# Patient Record
Sex: FEMALE | Race: WHITE | Employment: OTHER | URBAN - METROPOLITAN AREA
[De-identification: names, ages, dates, MRNs, and addresses within clinical notes are randomized per-mention and may not be internally consistent; named-entity substitution may affect disease eponyms.]

---

## 2017-04-20 ENCOUNTER — HOSPITAL ENCOUNTER (OUTPATIENT)
Dept: LAB | Facility: HOSPITAL | Age: 67
Discharge: HOME | End: 2017-04-20
Attending: INTERNAL MEDICINE
Payer: MEDICARE

## 2017-04-20 ENCOUNTER — PRIOR ORIGINAL RECORDS (OUTPATIENT)
Dept: OTHER | Age: 67
End: 2017-04-20

## 2017-04-20 PROCEDURE — 80053 COMPREHEN METABOLIC PANEL: CPT | Performed by: INTERNAL MEDICINE

## 2017-04-20 PROCEDURE — 84443 ASSAY THYROID STIM HORMONE: CPT | Performed by: INTERNAL MEDICINE

## 2017-04-20 PROCEDURE — 36415 COLL VENOUS BLD VENIPUNCTURE: CPT | Performed by: INTERNAL MEDICINE

## 2017-04-20 PROCEDURE — 80061 LIPID PANEL: CPT | Performed by: INTERNAL MEDICINE

## 2017-04-24 ENCOUNTER — PRIOR ORIGINAL RECORDS (OUTPATIENT)
Dept: OTHER | Age: 67
End: 2017-04-24

## 2017-04-25 LAB
ALBUMIN: 3.6 G/DL
ALKALINE PHOSPHATATE(ALK PHOS): 76 IU/L
BILIRUBIN TOTAL: 0.6 MG/DL
BUN: 12 MG/DL
CALCIUM: 9 MG/DL
CHLORIDE: 102 MEQ/L
CHOLESTEROL, TOTAL: 118 MG/DL
CREATININE, SERUM: 0.83 MG/DL
GLUCOSE: 90 MG/DL
HDL CHOLESTEROL: 52 MG/DL
LDL CHOLESTEROL: 43 MG/DL
POTASSIUM, SERUM: 4 MEQ/L
PROTEIN, TOTAL: 7.1 G/DL
SGOT (AST): 13 IU/L
SGPT (ALT): 17 IU/L
SODIUM: 139 MEQ/L
THYROID STIMULATING HORMONE: 4.14 MLU/L
TRIGLYCERIDES: 115 MG/DL

## 2017-05-08 ENCOUNTER — PRIOR ORIGINAL RECORDS (OUTPATIENT)
Dept: OTHER | Age: 67
End: 2017-05-08

## 2017-05-22 ENCOUNTER — PRIOR ORIGINAL RECORDS (OUTPATIENT)
Dept: OTHER | Age: 67
End: 2017-05-22

## 2017-05-22 ENCOUNTER — HOSPITAL ENCOUNTER (OUTPATIENT)
Dept: CT IMAGING | Facility: HOSPITAL | Age: 67
Discharge: HOME | End: 2017-05-22
Attending: INTERNAL MEDICINE
Payer: MEDICARE

## 2017-05-22 DIAGNOSIS — R07.9 CHEST PAIN: ICD-10-CM

## 2017-05-22 DIAGNOSIS — I25.10 CORONARY ATHEROSCLEROSIS OF NATIVE CORONARY ARTERY: ICD-10-CM

## 2017-05-22 DIAGNOSIS — R06.03 ACUTE RESPIRATORY DISTRESS: ICD-10-CM

## 2017-05-22 PROCEDURE — 71275 CT ANGIOGRAPHY CHEST: CPT | Performed by: INTERNAL MEDICINE

## 2017-05-25 ENCOUNTER — HOSPITAL ENCOUNTER (OUTPATIENT)
Dept: CV DIAGNOSTICS | Facility: HOSPITAL | Age: 67
Discharge: HOME | End: 2017-05-25
Attending: INTERNAL MEDICINE
Payer: MEDICARE

## 2017-05-25 DIAGNOSIS — I25.10 CORONARY ATHEROSCLEROSIS: ICD-10-CM

## 2017-05-25 PROCEDURE — 93017 CV STRESS TEST TRACING ONLY: CPT | Performed by: INTERNAL MEDICINE

## 2017-05-25 PROCEDURE — 93018 CV STRESS TEST I&R ONLY: CPT | Performed by: INTERNAL MEDICINE

## 2017-05-25 PROCEDURE — 78452 HT MUSCLE IMAGE SPECT MULT: CPT | Performed by: INTERNAL MEDICINE

## 2017-05-25 NOTE — PROGRESS NOTES
Patient had 4/10 chest tightness during her Lexiscan Nuc Stress Test which resolved with O2 at 2L and caffiene. After being stressed the patient was walking to the bathroom and became weak requiring a wheelchair. No chest pain was noted.  /60 HR 60 wi

## 2017-10-21 DIAGNOSIS — E03.9 HYPOTHYROIDISM, UNSPECIFIED TYPE: ICD-10-CM

## 2017-10-23 RX ORDER — LEVOTHYROXINE SODIUM 50 MCG
50 TABLET ORAL
Qty: 90 TABLET | Refills: 0 | Status: ON HOLD | OUTPATIENT
Start: 2017-10-23 | End: 2018-07-08 | Stop reason: DRUGHIGH

## 2017-10-24 ENCOUNTER — LAB ENCOUNTER (OUTPATIENT)
Dept: LAB | Age: 67
End: 2017-10-24
Attending: INTERNAL MEDICINE
Payer: MEDICARE

## 2017-10-24 ENCOUNTER — PRIOR ORIGINAL RECORDS (OUTPATIENT)
Dept: OTHER | Age: 67
End: 2017-10-24

## 2017-10-24 DIAGNOSIS — I25.10 CORONARY ATHEROSCLEROSIS OF NATIVE CORONARY ARTERY: Primary | ICD-10-CM

## 2017-10-24 DIAGNOSIS — E03.2 IATROGENIC HYPOTHYROIDISM: ICD-10-CM

## 2017-10-24 DIAGNOSIS — R07.2 PRECORDIAL PAIN: ICD-10-CM

## 2017-10-24 DIAGNOSIS — E78.2 MIXED HYPERLIPIDEMIA: ICD-10-CM

## 2017-10-24 PROCEDURE — 80061 LIPID PANEL: CPT

## 2017-10-24 PROCEDURE — 36415 COLL VENOUS BLD VENIPUNCTURE: CPT

## 2017-10-24 PROCEDURE — 80053 COMPREHEN METABOLIC PANEL: CPT

## 2017-10-24 PROCEDURE — 84443 ASSAY THYROID STIM HORMONE: CPT

## 2017-10-25 LAB
ALKALINE PHOSPHATATE(ALK PHOS): 74 IU/L
BILIRUBIN TOTAL: 0.6 MG/DL
BUN: 13 MG/DL
CALCIUM: 9.1 MG/DL
CHLORIDE: 107 MEQ/L
CHOLESTEROL, TOTAL: 117 MG/DL
CREATININE, SERUM: 0.83 MG/DL
GLUCOSE: 88 MG/DL
HDL CHOLESTEROL: 54 MG/DL
LDL CHOLESTEROL: 43 MG/DL
POTASSIUM, SERUM: 3.9 MEQ/L
PROTEIN, TOTAL: 7 G/DL
SGOT (AST): 17 IU/L
SGPT (ALT): 19 IU/L
SODIUM: 141 MEQ/L
THYROID STIMULATING HORMONE: 4.79 MLU/L
TRIGLYCERIDES: 101 MG/DL

## 2017-11-13 ENCOUNTER — PRIOR ORIGINAL RECORDS (OUTPATIENT)
Dept: OTHER | Age: 67
End: 2017-11-13

## 2017-11-14 ENCOUNTER — OFFICE VISIT (OUTPATIENT)
Dept: FAMILY MEDICINE CLINIC | Facility: CLINIC | Age: 67
End: 2017-11-14

## 2017-11-14 VITALS
WEIGHT: 136 LBS | TEMPERATURE: 98 F | HEART RATE: 94 BPM | BODY MASS INDEX: 23.22 KG/M2 | OXYGEN SATURATION: 94 % | HEIGHT: 64 IN | DIASTOLIC BLOOD PRESSURE: 60 MMHG | RESPIRATION RATE: 16 BRPM | SYSTOLIC BLOOD PRESSURE: 116 MMHG

## 2017-11-14 DIAGNOSIS — S70.11XA HEMATOMA OF THIGH, RIGHT, INITIAL ENCOUNTER: Primary | ICD-10-CM

## 2017-11-14 PROCEDURE — 99213 OFFICE O/P EST LOW 20 MIN: CPT | Performed by: FAMILY MEDICINE

## 2017-11-14 NOTE — PROGRESS NOTES
Approximately 4-5 days ago patient fell into a chair no loss of consciousness no amnesia but developed a very immediate bruise measuring 6 x 12\" on her right lateral thigh. She is on aspirin and Plavix.     Patient is a smoker she is also under supervisio

## 2017-11-20 ENCOUNTER — MYAURORA ACCOUNT LINK (OUTPATIENT)
Dept: OTHER | Age: 67
End: 2017-11-20

## 2017-11-20 ENCOUNTER — PRIOR ORIGINAL RECORDS (OUTPATIENT)
Dept: OTHER | Age: 67
End: 2017-11-20

## 2017-11-20 ENCOUNTER — HOSPITAL ENCOUNTER (OUTPATIENT)
Dept: CARDIOLOGY CLINIC | Facility: HOSPITAL | Age: 67
Discharge: HOME | End: 2017-11-20
Attending: INTERNAL MEDICINE

## 2017-11-20 DIAGNOSIS — I73.9 CLAUDICATION (HCC): ICD-10-CM

## 2017-11-20 DIAGNOSIS — T14.8XXA HEMATOMA: ICD-10-CM

## 2017-11-20 DIAGNOSIS — M79.606 PAIN OF LOWER EXTREMITY, UNSPECIFIED LATERALITY: ICD-10-CM

## 2017-11-29 ENCOUNTER — HOSPITAL ENCOUNTER (OUTPATIENT)
Dept: ULTRASOUND IMAGING | Facility: HOSPITAL | Age: 67
Discharge: HOME | End: 2017-11-29
Attending: INTERNAL MEDICINE
Payer: MEDICARE

## 2017-11-29 DIAGNOSIS — I73.9 CLAUDICATION (HCC): ICD-10-CM

## 2017-11-29 DIAGNOSIS — M79.606 PAIN OF LOWER EXTREMITY, UNSPECIFIED LATERALITY: ICD-10-CM

## 2017-11-29 DIAGNOSIS — T14.8XXA HEMATOMA: ICD-10-CM

## 2017-11-29 PROCEDURE — 93970 EXTREMITY STUDY: CPT | Performed by: INTERNAL MEDICINE

## 2017-12-27 ENCOUNTER — OFFICE VISIT (OUTPATIENT)
Dept: FAMILY MEDICINE CLINIC | Facility: CLINIC | Age: 67
End: 2017-12-27

## 2017-12-27 VITALS
SYSTOLIC BLOOD PRESSURE: 120 MMHG | HEART RATE: 84 BPM | TEMPERATURE: 99 F | HEIGHT: 64 IN | BODY MASS INDEX: 23.22 KG/M2 | WEIGHT: 136 LBS | RESPIRATION RATE: 20 BRPM | OXYGEN SATURATION: 98 % | DIASTOLIC BLOOD PRESSURE: 80 MMHG

## 2017-12-27 DIAGNOSIS — I25.10 CORONARY ARTERY DISEASE INVOLVING NATIVE CORONARY ARTERY OF NATIVE HEART WITHOUT ANGINA PECTORIS: ICD-10-CM

## 2017-12-27 DIAGNOSIS — K57.51 DIVERTICULOSIS OF BOTH SMALL AND LARGE INTESTINE WITH BLEEDING: ICD-10-CM

## 2017-12-27 DIAGNOSIS — E55.9 VITAMIN D DEFICIENCY: ICD-10-CM

## 2017-12-27 DIAGNOSIS — J44.9 CHRONIC OBSTRUCTIVE PULMONARY DISEASE, UNSPECIFIED COPD TYPE (HCC): ICD-10-CM

## 2017-12-27 DIAGNOSIS — Z72.0 TOBACCO ABUSE: ICD-10-CM

## 2017-12-27 DIAGNOSIS — E03.9 HYPOTHYROIDISM, UNSPECIFIED TYPE: ICD-10-CM

## 2017-12-27 DIAGNOSIS — I20.8 STABLE ANGINA (HCC): ICD-10-CM

## 2017-12-27 DIAGNOSIS — I20.0 UNSTABLE ANGINA (HCC): ICD-10-CM

## 2017-12-27 DIAGNOSIS — G43.001 MIGRAINE WITHOUT AURA AND WITH STATUS MIGRAINOSUS, NOT INTRACTABLE: ICD-10-CM

## 2017-12-27 DIAGNOSIS — Z13.820 SCREENING FOR OSTEOPOROSIS: ICD-10-CM

## 2017-12-27 DIAGNOSIS — I73.9 CLAUDICATION (HCC): ICD-10-CM

## 2017-12-27 DIAGNOSIS — Z12.31 ENCOUNTER FOR SCREENING MAMMOGRAM FOR HIGH-RISK PATIENT: ICD-10-CM

## 2017-12-27 DIAGNOSIS — F33.1 MODERATE RECURRENT MAJOR DEPRESSION (HCC): Chronic | ICD-10-CM

## 2017-12-27 DIAGNOSIS — Z79.02 PLATELET INHIBITION DUE TO PLAVIX: ICD-10-CM

## 2017-12-27 DIAGNOSIS — E04.9 GOITER: ICD-10-CM

## 2017-12-27 DIAGNOSIS — Z78.0 ASYMPTOMATIC MENOPAUSE: ICD-10-CM

## 2017-12-27 DIAGNOSIS — Z00.00 ENCOUNTER FOR ANNUAL HEALTH EXAMINATION: Primary | ICD-10-CM

## 2017-12-27 DIAGNOSIS — Z12.11 COLON CANCER SCREENING: ICD-10-CM

## 2017-12-27 DIAGNOSIS — F41.9 ANXIETY: ICD-10-CM

## 2017-12-27 DIAGNOSIS — Z13.31 DEPRESSION SCREENING: ICD-10-CM

## 2017-12-27 PROCEDURE — 99213 OFFICE O/P EST LOW 20 MIN: CPT | Performed by: FAMILY MEDICINE

## 2017-12-27 PROCEDURE — G0439 PPPS, SUBSEQ VISIT: HCPCS | Performed by: FAMILY MEDICINE

## 2017-12-27 PROCEDURE — G0444 DEPRESSION SCREEN ANNUAL: HCPCS | Performed by: FAMILY MEDICINE

## 2017-12-27 RX ORDER — LEVOTHYROXINE SODIUM 75 MCG
75 TABLET ORAL
Qty: 90 TABLET | Refills: 1 | Status: SHIPPED | OUTPATIENT
Start: 2017-12-27 | End: 2018-06-14

## 2017-12-27 NOTE — PATIENT INSTRUCTIONS
Donaldo Rendon's SCREENING SCHEDULE   Tests on this list are recommended by your physician but may not be covered, or covered at this frequency, by your insurer. Please check with your insurance carrier before scheduling to verify coverage.    PREVENTATI • Men who are 73-68 years old and have smoked more than 100 cigarettes in their lifetime   • Anyone with a family history    Colorectal Cancer Screening  Covered up to Age 76     Colonoscopy Screen   Covered every 10 years- more often if abnormal Colonos found for this or any previous visit.  Please get every year    Pneumococcal 13 (Prevnar)  Covered Once after 65   Orders placed or performed in visit on 10/13/15  -PNEUMOCOCCAL VACC, 13 NILDA IM    Please get once after your 65th birthday    Pneumococcal 23

## 2017-12-27 NOTE — PROGRESS NOTES
HPI:   Meek Issa is a 78year old female who presents for a Medicare Subsequent Annual Wellness visit (Pt already had Initial Annual Wellness).     Pt also here to follow up on depression, anxiety, copd, hypothyroid   New c/o alopecia; pt wants to a your work, take care of things at home, or get along with other people?: Somewhat difficult        Advanced Directive:   She does have a Living Will but we do NOT have it on file in [address] [place] [address] Rd.    The patient has this document but we do not have it in Epic, and p cardiology      Chronic obstructive pulmonary disease ([facility] [address]- pt not using her inhalers      Moderate recurrent major depression ([facility] [address]- pt worse due to the holidays; pt sees psychology     Wt Readings from Last 3 Encounters:  12/27/17 : 136 lb  11/14/17 : 1 Butalbital-APAP-Caffeine (FIORICET, ESGIC) -40 MG Oral Tab Take 1 tablet by mouth every 6 (six) hours as needed for Pain.  Take one to 2 tablets as needed   Isosorbide Mononitrate ER (IMDUR) 30 MG Oral Tablet 24 Hr    Metoprolol Succinate ER (TOPROL GENERAL: feels well otherwise  SKIN: denies any unusual skin lesions  EYES: denies blurred vision or double vision  HEENT: denies nasal congestion, sinus pain or ST  LUNGS: denies shortness of breath with exertion  CARDIOVASCULAR: denies chest pain on ex murmur, rub, or gallop   Abdomen:   Soft, non-tender, bowel sounds active all four quadrants,  no masses, no organomegaly   Pelvic: Deferred   Extremities: Extremities normal, atraumatic, no cyanosis or edema   Pulses: 2+ and symmetric   Skin: Skin color, artery disease involving native coronary artery of native heart without angina pectoris- pt sees cardiology   -     OFFICE/OUTPT VISIT,EST,LEVL III    Hypothyroidism, unspecified type- increase dose ; repeat labs in 6 weeks   -     SYNTHROID 75 MCG Oral Ta describe your current health state?: (P) Fair  How do you maintain positive mental well-being?: (P) Social Interaction;Puzzles      This section provided for quick review of chart, separate sheet to patient  PREVENTATIVE SERVICES  INDICATIONS AND SCHEDULE (Update Immunization Activity if applicable)     Influenza  Covered Annually  Please get every year    Pneumococcal 13 (Prevnar)  Covered Once after 65 10/13/2015 Please get once after your 65th birthday    Pneumococcal 23 (Pneumovax)  Covered Once after 6

## 2018-01-19 ENCOUNTER — MYAURORA ACCOUNT LINK (OUTPATIENT)
Dept: OTHER | Age: 68
End: 2018-01-19

## 2018-01-19 ENCOUNTER — PRIOR ORIGINAL RECORDS (OUTPATIENT)
Dept: OTHER | Age: 68
End: 2018-01-19

## 2018-02-12 ENCOUNTER — HOSPITAL ENCOUNTER (OUTPATIENT)
Dept: MAMMOGRAPHY | Age: 68
Discharge: HOME | End: 2018-02-12
Attending: FAMILY MEDICINE
Payer: MEDICARE

## 2018-02-12 ENCOUNTER — HOSPITAL ENCOUNTER (OUTPATIENT)
Dept: ULTRASOUND IMAGING | Age: 68
Discharge: HOME | End: 2018-02-12
Attending: FAMILY MEDICINE
Payer: MEDICARE

## 2018-02-12 ENCOUNTER — APPOINTMENT (OUTPATIENT)
Dept: LAB | Age: 68
End: 2018-02-12
Attending: FAMILY MEDICINE
Payer: MEDICARE

## 2018-02-12 ENCOUNTER — HOSPITAL ENCOUNTER (OUTPATIENT)
Dept: BONE DENSITY | Age: 68
Discharge: HOME | End: 2018-02-12
Attending: FAMILY MEDICINE
Payer: MEDICARE

## 2018-02-12 DIAGNOSIS — E03.9 HYPOTHYROIDISM, UNSPECIFIED TYPE: ICD-10-CM

## 2018-02-12 DIAGNOSIS — E55.9 VITAMIN D DEFICIENCY: ICD-10-CM

## 2018-02-12 DIAGNOSIS — E04.9 GOITER: ICD-10-CM

## 2018-02-12 DIAGNOSIS — Z12.31 ENCOUNTER FOR SCREENING MAMMOGRAM FOR HIGH-RISK PATIENT: ICD-10-CM

## 2018-02-12 DIAGNOSIS — Z13.820 SCREENING FOR OSTEOPOROSIS: ICD-10-CM

## 2018-02-12 DIAGNOSIS — Z78.0 ASYMPTOMATIC MENOPAUSE: ICD-10-CM

## 2018-02-12 LAB
25-HYDROXYVITAMIN D (TOTAL): 33.6 NG/ML (ref 30–100)
FREE T4: 1.1 NG/DL (ref 0.9–1.8)
TSI SER-ACNC: 2.06 MIU/ML (ref 0.35–5.5)

## 2018-02-12 PROCEDURE — 77080 DXA BONE DENSITY AXIAL: CPT | Performed by: FAMILY MEDICINE

## 2018-02-12 PROCEDURE — 82306 VITAMIN D 25 HYDROXY: CPT | Performed by: FAMILY MEDICINE

## 2018-02-12 PROCEDURE — 77063 BREAST TOMOSYNTHESIS BI: CPT | Performed by: FAMILY MEDICINE

## 2018-02-12 PROCEDURE — 77067 SCR MAMMO BI INCL CAD: CPT | Performed by: FAMILY MEDICINE

## 2018-02-12 PROCEDURE — 84439 ASSAY OF FREE THYROXINE: CPT | Performed by: FAMILY MEDICINE

## 2018-02-12 PROCEDURE — 84443 ASSAY THYROID STIM HORMONE: CPT | Performed by: FAMILY MEDICINE

## 2018-02-12 PROCEDURE — 36415 COLL VENOUS BLD VENIPUNCTURE: CPT | Performed by: FAMILY MEDICINE

## 2018-02-12 PROCEDURE — 76536 US EXAM OF HEAD AND NECK: CPT | Performed by: FAMILY MEDICINE

## 2018-05-07 ENCOUNTER — PRIOR ORIGINAL RECORDS (OUTPATIENT)
Dept: OTHER | Age: 68
End: 2018-05-07

## 2018-05-07 ENCOUNTER — LAB ENCOUNTER (OUTPATIENT)
Dept: LAB | Age: 68
End: 2018-05-07
Attending: INTERNAL MEDICINE
Payer: MEDICARE

## 2018-05-07 DIAGNOSIS — I25.10 CORONARY ATHEROSCLEROSIS OF NATIVE CORONARY ARTERY: ICD-10-CM

## 2018-05-07 DIAGNOSIS — E03.2 IATROGENIC HYPOTHYROIDISM: Primary | ICD-10-CM

## 2018-05-07 DIAGNOSIS — E78.2 MIXED HYPERLIPIDEMIA: ICD-10-CM

## 2018-05-07 PROCEDURE — 84443 ASSAY THYROID STIM HORMONE: CPT

## 2018-05-07 PROCEDURE — 80061 LIPID PANEL: CPT

## 2018-05-07 PROCEDURE — 36415 COLL VENOUS BLD VENIPUNCTURE: CPT

## 2018-05-07 PROCEDURE — 80053 COMPREHEN METABOLIC PANEL: CPT

## 2018-05-10 LAB
ALKALINE PHOSPHATATE(ALK PHOS): 83 IU/L
BILIRUBIN TOTAL: 0.8 MG/DL
BUN: 10 MG/DL
CALCIUM: 9 MG/DL
CHLORIDE: 105 MEQ/L
CHOLESTEROL, TOTAL: 117 MG/DL
CREATININE, SERUM: 0.9 MG/DL
GLUCOSE: 89 MG/DL
HDL CHOLESTEROL: 45 MG/DL
LDL CHOLESTEROL: 49 MG/DL
POTASSIUM, SERUM: 4.1 MEQ/L
PROTEIN, TOTAL: 7.3 G/DL
SGOT (AST): 15 IU/L
SGPT (ALT): 16 IU/L
SODIUM: 138 MEQ/L
THYROID STIMULATING HORMONE: 2.26 MLU/L
TRIGLYCERIDES: 115 MG/DL

## 2018-06-25 ENCOUNTER — PRIOR ORIGINAL RECORDS (OUTPATIENT)
Dept: OTHER | Age: 68
End: 2018-06-25

## 2018-07-08 ENCOUNTER — PRIOR ORIGINAL RECORDS (OUTPATIENT)
Dept: OTHER | Age: 68
End: 2018-07-08

## 2018-07-08 ENCOUNTER — HOSPITAL ENCOUNTER (OUTPATIENT)
Facility: HOSPITAL | Age: 68
Setting detail: OBSERVATION
LOS: 1 days | Discharge: HOME | End: 2018-07-09
Attending: EMERGENCY MEDICINE | Admitting: HOSPITALIST
Payer: MEDICARE

## 2018-07-08 ENCOUNTER — APPOINTMENT (OUTPATIENT)
Dept: GENERAL RADIOLOGY | Facility: HOSPITAL | Age: 68
End: 2018-07-08
Attending: EMERGENCY MEDICINE
Payer: MEDICARE

## 2018-07-08 ENCOUNTER — APPOINTMENT (OUTPATIENT)
Dept: CV DIAGNOSTICS | Facility: HOSPITAL | Age: 68
End: 2018-07-08
Attending: HOSPITALIST
Payer: MEDICARE

## 2018-07-08 DIAGNOSIS — I25.10 CORONARY ARTERY DISEASE INVOLVING NATIVE HEART, ANGINA PRESENCE UNSPECIFIED, UNSPECIFIED VESSEL OR LESION TYPE: ICD-10-CM

## 2018-07-08 DIAGNOSIS — R07.9 ACUTE CHEST PAIN: Primary | ICD-10-CM

## 2018-07-08 LAB
ALBUMIN SERPL-MCNC: 3.3 G/DL (ref 3.5–4.8)
ALP LIVER SERPL-CCNC: 75 U/L (ref 55–142)
ALT SERPL-CCNC: 19 U/L (ref 14–54)
APTT PPP: 32.4 SECONDS (ref 26.1–34.6)
AST SERPL-CCNC: 13 U/L (ref 15–41)
ATRIAL RATE: 77 BPM
BASOPHILS # BLD AUTO: 0.05 X10(3) UL (ref 0–0.1)
BASOPHILS NFR BLD AUTO: 0.8 %
BILIRUB SERPL-MCNC: 0.7 MG/DL (ref 0.1–2)
BUN BLD-MCNC: 11 MG/DL (ref 8–20)
CALCIUM BLD-MCNC: 8.9 MG/DL (ref 8.3–10.3)
CHLORIDE: 104 MMOL/L (ref 101–111)
CO2: 26 MMOL/L (ref 22–32)
CREAT BLD-MCNC: 0.82 MG/DL (ref 0.55–1.02)
EOSINOPHIL # BLD AUTO: 0.09 X10(3) UL (ref 0–0.3)
EOSINOPHIL NFR BLD AUTO: 1.4 %
ERYTHROCYTE [DISTWIDTH] IN BLOOD BY AUTOMATED COUNT: 13.4 % (ref 11.5–16)
GLUCOSE BLD-MCNC: 107 MG/DL (ref 70–99)
HCT VFR BLD AUTO: 41.3 % (ref 34–50)
HGB BLD-MCNC: 14.7 G/DL (ref 12–16)
IMMATURE GRANULOCYTE COUNT: 0.03 X10(3) UL (ref 0–1)
IMMATURE GRANULOCYTE RATIO %: 0.5 %
LYMPHOCYTES # BLD AUTO: 1.5 X10(3) UL (ref 0.9–4)
LYMPHOCYTES NFR BLD AUTO: 22.7 %
M PROTEIN MFR SERPL ELPH: 6.9 G/DL (ref 6.1–8.3)
MCH RBC QN AUTO: 32.2 PG (ref 27–33.2)
MCHC RBC AUTO-ENTMCNC: 35.6 G/DL (ref 31–37)
MCV RBC AUTO: 90.6 FL (ref 81–100)
MONOCYTES # BLD AUTO: 0.46 X10(3) UL (ref 0.1–1)
MONOCYTES NFR BLD AUTO: 7 %
NEUTROPHIL ABS PRELIM: 4.48 X10 (3) UL (ref 1.3–6.7)
NEUTROPHILS # BLD AUTO: 4.48 X10(3) UL (ref 1.3–6.7)
NEUTROPHILS NFR BLD AUTO: 67.6 %
P AXIS: 74 DEGREES
P-R INTERVAL: 190 MS
PLATELET # BLD AUTO: 223 10(3)UL (ref 150–450)
POTASSIUM SERPL-SCNC: 3.9 MMOL/L (ref 3.6–5.1)
Q-T INTERVAL: 374 MS
QRS DURATION: 102 MS
QTC CALCULATION (BEZET): 423 MS
R AXIS: -62 DEGREES
RBC # BLD AUTO: 4.56 X10(6)UL (ref 3.8–5.1)
RED CELL DISTRIBUTION WIDTH-SD: 44.2 FL (ref 35.1–46.3)
SODIUM SERPL-SCNC: 138 MMOL/L (ref 136–144)
T AXIS: 58 DEGREES
TROPONIN: <0.046 NG/ML (ref ?–0.05)
TROPONIN: <0.046 NG/ML (ref ?–0.05)
VENTRICULAR RATE: 77 BPM
WBC # BLD AUTO: 6.6 X10(3) UL (ref 4–13)

## 2018-07-08 PROCEDURE — 71045 X-RAY EXAM CHEST 1 VIEW: CPT | Performed by: EMERGENCY MEDICINE

## 2018-07-08 PROCEDURE — 99220 INITIAL OBSERVATION CARE,LEVL III: CPT | Performed by: HOSPITALIST

## 2018-07-08 PROCEDURE — 93306 TTE W/DOPPLER COMPLETE: CPT | Performed by: HOSPITALIST

## 2018-07-08 RX ORDER — METOCLOPRAMIDE HYDROCHLORIDE 5 MG/ML
10 INJECTION INTRAMUSCULAR; INTRAVENOUS EVERY 8 HOURS PRN
Status: DISCONTINUED | OUTPATIENT
Start: 2018-07-08 | End: 2018-07-09

## 2018-07-08 RX ORDER — NICOTINE 21 MG/24HR
1 PATCH, TRANSDERMAL 24 HOURS TRANSDERMAL DAILY
Status: DISCONTINUED | OUTPATIENT
Start: 2018-07-08 | End: 2018-07-09

## 2018-07-08 RX ORDER — ASPIRIN 325 MG
325 TABLET, DELAYED RELEASE (ENTERIC COATED) ORAL DAILY
Status: DISCONTINUED | OUTPATIENT
Start: 2018-07-08 | End: 2018-07-09

## 2018-07-08 RX ORDER — ACETAMINOPHEN 325 MG/1
650 TABLET ORAL EVERY 6 HOURS PRN
Status: DISCONTINUED | OUTPATIENT
Start: 2018-07-08 | End: 2018-07-09

## 2018-07-08 RX ORDER — HEPARIN SODIUM AND DEXTROSE 10000; 5 [USP'U]/100ML; G/100ML
INJECTION INTRAVENOUS CONTINUOUS
Status: DISCONTINUED | OUTPATIENT
Start: 2018-07-08 | End: 2018-07-08

## 2018-07-08 RX ORDER — TRAZODONE HYDROCHLORIDE 50 MG/1
50 TABLET ORAL NIGHTLY PRN
Status: DISCONTINUED | OUTPATIENT
Start: 2018-07-08 | End: 2018-07-09

## 2018-07-08 RX ORDER — ONDANSETRON 2 MG/ML
4 INJECTION INTRAMUSCULAR; INTRAVENOUS EVERY 6 HOURS PRN
Status: DISCONTINUED | OUTPATIENT
Start: 2018-07-08 | End: 2018-07-09

## 2018-07-08 RX ORDER — HEPARIN SODIUM 5000 [USP'U]/ML
60 INJECTION INTRAVENOUS; SUBCUTANEOUS ONCE
Status: COMPLETED | OUTPATIENT
Start: 2018-07-08 | End: 2018-07-08

## 2018-07-08 RX ORDER — LEVOTHYROXINE SODIUM 0.07 MG/1
75 TABLET ORAL
Status: DISCONTINUED | OUTPATIENT
Start: 2018-07-08 | End: 2018-07-09

## 2018-07-08 RX ORDER — FLUTICASONE PROPIONATE 50 MCG
1 SPRAY, SUSPENSION (ML) NASAL DAILY
Status: DISCONTINUED | OUTPATIENT
Start: 2018-07-08 | End: 2018-07-09

## 2018-07-08 RX ORDER — ASPIRIN 81 MG/1
324 TABLET, CHEWABLE ORAL ONCE
Status: COMPLETED | OUTPATIENT
Start: 2018-07-08 | End: 2018-07-08

## 2018-07-08 RX ORDER — NITROGLYCERIN 0.4 MG/1
0.4 TABLET SUBLINGUAL ONCE
Status: COMPLETED | OUTPATIENT
Start: 2018-07-08 | End: 2018-07-08

## 2018-07-08 RX ORDER — MORPHINE SULFATE 4 MG/ML
4 INJECTION, SOLUTION INTRAMUSCULAR; INTRAVENOUS EVERY 2 HOUR PRN
Status: DISCONTINUED | OUTPATIENT
Start: 2018-07-08 | End: 2018-07-09

## 2018-07-08 RX ORDER — CLOPIDOGREL BISULFATE 75 MG/1
75 TABLET ORAL DAILY
Status: DISCONTINUED | OUTPATIENT
Start: 2018-07-08 | End: 2018-07-09

## 2018-07-08 RX ORDER — MORPHINE SULFATE 4 MG/ML
2 INJECTION, SOLUTION INTRAMUSCULAR; INTRAVENOUS EVERY 2 HOUR PRN
Status: DISCONTINUED | OUTPATIENT
Start: 2018-07-08 | End: 2018-07-09

## 2018-07-08 RX ORDER — BUTALBITAL, ACETAMINOPHEN AND CAFFEINE 50; 325; 40 MG/1; MG/1; MG/1
1 TABLET ORAL EVERY 6 HOURS PRN
Status: DISCONTINUED | OUTPATIENT
Start: 2018-07-08 | End: 2018-07-09

## 2018-07-08 RX ORDER — ATORVASTATIN CALCIUM 10 MG/1
10 TABLET, FILM COATED ORAL NIGHTLY
Status: DISCONTINUED | OUTPATIENT
Start: 2018-07-08 | End: 2018-07-09

## 2018-07-08 RX ORDER — ISOSORBIDE MONONITRATE 30 MG/1
30 TABLET, EXTENDED RELEASE ORAL DAILY
Status: DISCONTINUED | OUTPATIENT
Start: 2018-07-09 | End: 2018-07-09

## 2018-07-08 RX ORDER — MORPHINE SULFATE 4 MG/ML
1 INJECTION, SOLUTION INTRAMUSCULAR; INTRAVENOUS EVERY 2 HOUR PRN
Status: DISCONTINUED | OUTPATIENT
Start: 2018-07-08 | End: 2018-07-09

## 2018-07-08 RX ORDER — ASPIRIN 81 MG/1
324 TABLET, CHEWABLE ORAL ONCE
Status: DISCONTINUED | OUTPATIENT
Start: 2018-07-08 | End: 2018-07-08

## 2018-07-08 RX ORDER — ALPRAZOLAM 0.5 MG/1
0.5 TABLET ORAL 3 TIMES DAILY PRN
Status: DISCONTINUED | OUTPATIENT
Start: 2018-07-08 | End: 2018-07-09

## 2018-07-08 RX ORDER — HEPARIN SODIUM AND DEXTROSE 10000; 5 [USP'U]/100ML; G/100ML
12 INJECTION INTRAVENOUS ONCE
Status: COMPLETED | OUTPATIENT
Start: 2018-07-08 | End: 2018-07-08

## 2018-07-08 NOTE — ED INITIAL ASSESSMENT (HPI)
Pt w/ L chest, jaw, and arm pain on and off x 3-4 days. Took Advil to help w/ jaw pain yesterday. Hx of MI. Pt also having a headache to L side.

## 2018-07-08 NOTE — H&P
ESME HOSPITALIST  History and Physical     Jacqueline Cervantes Patient Status:  Emergency    1950 MRN [id number]   Location [facility] Attending Gabbi Hill MD   [facility] Day # 0 PCP Gisselle Garcias DO     Chief Complaint: date: OTHER SURGICAL HISTORY      Comment: Ruptured cyst- right ovary  No date: TONSILLECTOMY    Social History:  reports that she has been smoking Cigarettes. She has been smoking about 1.00 pack per day.  She has never used smokeless tobacco. She reports total) under the tongue every 5 (five) minutes as needed. Disp: 30 tablet Rfl: 0   aspirin EC 81 MG Oral Tab EC Take 1 tablet (81 mg total) by mouth daily. Disp: 30 tablet Rfl: 11   Vortioxetine HBr (BRINTELLIX OR) Take 1.25 mg by mouth.  Disp:  Rfl:    But affect.       Diagnostic Data:      Labs:  Recent Labs   Lab  07/08/18   1319   WBC  6.6   HGB  14.7   MCV  90.6   PLT  223.0       Recent Labs   Lab  07/08/18   1319   GLU  107*   BUN  11   CREATSERUM  0.82   GFRAA  86   GFRNAA  75   CA  8.9   ALB  3.3*

## 2018-07-08 NOTE — ED PROVIDER NOTES
Patient Seen in: [facility] Emergency Department    History   Patient presents with:  Chest Pain Angina (cardiovascular)    Stated Complaint:     HPI    Patient is a 72-year-old female smoker with a history of coronary artery disease, 4 stents with OTHER SURGICAL HISTORY      Comment: Ruptured cyst- right ovary  No date: TONSILLECTOMY        Smoking status: Current Every Day Smoker                                                   Packs/day: 1.00      Years: 0.00         Types: Cigarettes  Smokeless following:        Result Value    Glucose 107 (*)     AST 13 (*)     Albumin 3.3 (*)     All other components within normal limits   TROPONIN I - Normal   CBC WITH DIFFERENTIAL WITH PLATELET    Narrative:      The following orders were created for panel ord she has had similar pain with MIs in the past, she will need to be admitted for serial troponins and cardiology evaluation. Discussed with Dr. Funmilayo Quinn from cardiology who agrees and he will see the patient.       Disposition and Plan     Clinical Impression:

## 2018-07-08 NOTE — HISTORICAL OFFICE NOTE
Aliealmaz Miles  : 1950  ACCOUNT:  [id number]  [phone]  PCP: Dr. Jhony France     TODAY'S DATE: 2018  DICTATED BY:  [Dr. Frank Mora      CHIEF COMPLAINT: [Followup of History of drug eluting stent RCA 2014 and Followup of Occlusion and agusto M.I.10/14/10 and PTCA/Stent    FAMILY HISTORY: Negative for premature CAD. Negative for AAA. SOCIAL HISTORY: SMOKING: Current every day tobacco use, advised to quit. smokes, advised to quit less than 1 ppd. CAFFEINE: none. ALCOHOL: drinks rarely.  EXERCISE recommended exercise, smoking cessation and conservative medical management. We will continue to recommend a low-fat low-cholesterol diet as well as a regular exercise program as she is able to tolerate and will follow this very nice lady in office.   Her

## 2018-07-08 NOTE — PLAN OF CARE
CARDIOVASCULAR - ADULT    • Maintains optimal cardiac output and hemodynamic stability Progressing    • Absence of cardiac arrhythmias or at baseline Progressing          Pt. Received as an admit from ER to 1954. VSS.  Pt. States pain continue at 5/10, most

## 2018-07-09 ENCOUNTER — APPOINTMENT (OUTPATIENT)
Dept: CV DIAGNOSTICS | Facility: HOSPITAL | Age: 68
End: 2018-07-09
Attending: INTERNAL MEDICINE
Payer: MEDICARE

## 2018-07-09 VITALS
TEMPERATURE: 98 F | HEIGHT: 64 IN | BODY MASS INDEX: 23.05 KG/M2 | HEART RATE: 75 BPM | SYSTOLIC BLOOD PRESSURE: 141 MMHG | DIASTOLIC BLOOD PRESSURE: 85 MMHG | WEIGHT: 135 LBS | RESPIRATION RATE: 16 BRPM | OXYGEN SATURATION: 96 %

## 2018-07-09 PROCEDURE — 93018 CV STRESS TEST I&R ONLY: CPT | Performed by: INTERNAL MEDICINE

## 2018-07-09 PROCEDURE — 99217 OBSERVATION CARE DISCHARGE: CPT | Performed by: HOSPITALIST

## 2018-07-09 PROCEDURE — 78452 HT MUSCLE IMAGE SPECT MULT: CPT | Performed by: INTERNAL MEDICINE

## 2018-07-09 PROCEDURE — 93017 CV STRESS TEST TRACING ONLY: CPT | Performed by: INTERNAL MEDICINE

## 2018-07-09 NOTE — PLAN OF CARE
CARDIOVASCULAR - ADULT    • Maintains optimal cardiac output and hemodynamic stability Progressing    • Absence of cardiac arrhythmias or at baseline Progressing    Patient sinus rhythm on tele. No malignant arrythmias. Patient denies chest pain and KELL.  P

## 2018-07-09 NOTE — PAYOR COMM NOTE
--------------  ADMISSION REVIEW     [employer] MEDICARE ADV PPO  Subscriber #:  [id number]  Authorization Number: [id number]     Admit date: 7/8/18  Admit time: 0       Admitting Physician: Fran Berrios MD  Attending Physician:  Fran Berrios MD ---------                               -----------         ------                     CBC W/ DIFFERENTIAL[[id number]]                              Final result                 Please view results for these tests on the individual orders.    R Intravenous Anish Alston RN      Isosorbide Mononitrate ER (IMDUR) 24 hr tab 30 mg     Date Action Dose Route User    7/9/2018 0825 Given 30 mg Oral Terrance Saleh RN      Levothyroxine Sodium (SYNTHROID, LEVOTHROID) tab 75 mcg     Date Action Dose

## 2018-07-09 NOTE — CONSULTS
[facility]  Cardiology Consultation    Mariana Linder Patient Status:  Observation    1950 MRN [id number]   [facility] 8NE-A Attending Danni Bella MD   Hosp Day # 0 PCP Franchesca Swift DO     Reason for Consultation: unspecified(486)    • Post PTCA 11/6/2014   • Post traumatic stress disorder (PTSD)    • Tobacco abuse 12/23/2013   • Unspecified essential hypertension      Past Surgical History:  No date: ANGIOPLASTY (CORONARY)      Comment: With STENT  No date: APPENDE (SYNTHROID, LEVOTHROID) tab 75 mcg, 75 mcg, Oral, Before breakfast  •  atorvastatin (LIPITOR) tab 10 mg, 10 mg, Oral, Nightly  •  [START ON 7/9/2018] Isosorbide Mononitrate ER (IMDUR) 24 hr tab 30 mg, 30 mg, Oral, Daily  •  Fluticasone Propionate (FLONASE) 136 lb (61.7 kg)      Physical Exam:   General: Alert and oriented x 3. No apparent distress. No respiratory or constitutional distress. HEENT: Normocephalic, anicteric sclera, neck supple. + chronic hoarse voice  Neck: No JVD, carotids 2+, no bruits.   Ca Echo 7/8/2018: (prelim full dictated report to follow) normal LV size and systolic function with preserved LVEF, no regional wall motion abnormality, no pericardial effusion, no significant valvular regurgitation or stenosis    Impression:  · Acute demar

## 2018-07-09 NOTE — PLAN OF CARE
Nuclear stress test normal with EF 68%    Ok to discharge from cardiology standpoint    F/u with Dr. Chano Thacker

## 2018-07-09 NOTE — CM/SW NOTE
COND 44: Patient failed Inpatient criteria. Second level of review completed and supports Observation. UR committee in agreement. Discussed with Dr Rogers Fearing approves.

## 2018-07-09 NOTE — PROGRESS NOTES
[facility]  Cardiology Progress Note    Subjective:  No chest pain or shortness of breath.     Objective:  /71 (BP Location: Left arm)   Pulse 68   Temp 97.9 °F (36.6 °C) (Oral)   Resp 16   Ht 5' 4\" (1.626 m)   Wt 135 lb (61.2 kg)   SpO2 98%   B APN  7/9/2018  10:50 AM

## 2018-07-10 ENCOUNTER — PATIENT OUTREACH (OUTPATIENT)
Dept: CASE MANAGEMENT | Age: 68
End: 2018-07-10

## 2018-07-10 DIAGNOSIS — R07.9 ACUTE CHEST PAIN: ICD-10-CM

## 2018-07-10 DIAGNOSIS — Z72.0 TOBACCO ABUSE: ICD-10-CM

## 2018-07-10 DIAGNOSIS — J44.9 CHRONIC OBSTRUCTIVE PULMONARY DISEASE, UNSPECIFIED COPD TYPE (HCC): ICD-10-CM

## 2018-07-10 NOTE — DISCHARGE SUMMARY
ESME HOSPITALIST  DISCHARGE SUMMARY     Monica Zuniga Patient Status:  Observation    1950 MRN [id number]   [facility] 8NE-A Attending No att. providers found   Hosp Day # 1 PCP Gloria Ledbetter DO     Date of Admission: 2018  Da instructions. USE TWO SPRAYS IN EACH NOSTRIL ONE TIME DAILY.    Quantity:  16 g  Refills:  2     Ipratropium-Albuterol  MCG/ACT Aers  Commonly known as:  COMBIVENT RESPIMAT  What changed:  · when to take this  · reasons to take this      Inhale 1 reviewed: no    Follow-up appointment:   Hansa Mcknight DO  [address] [place] [address] [id number] [address] [phone]    In 1 week      Kobe Easton MD  [address] [id number]    In 2

## 2018-07-11 NOTE — PAYOR COMM NOTE
--------------  DISCHARGE REVIEW    Payor: [employer] MEDICARE ADV PPO  Subscriber #:  [id number]  Authorization Number: [id number]     Admit date: 7/8/18  Admit time:  5671  Discharge Date: 7/9/2018  6:17 PM     Admitting Physician: Lowell Chatterjee MD  Attendin

## 2018-07-11 NOTE — PROGRESS NOTES
Initial Post Discharge Follow Up   Discharge Date: 7/9/18  Contact Date: 7/10/2018    Consent Verification:  Assessment Completed With: Patient  HIPAA Verified?   Yes    Discharge Dx:    Atypical chest pain   CAD with Hx of multiple PCI's, tobacco abuse IN EACH NOSTRIL ONE TIME DAILY.  (Patient taking differently: USE TWO SPRAYS IN EACH NOSTRIL ONE TIME DAILY prn) Disp: 16 g Rfl: 2   nitroGLYCERIN (NITROSTAT) 0.4 MG Sublingual SL Tab Place 1 tablet (0.4 mg total) under the tongue every 5 (five) minutes as inhalers as needed  Are you currently on oxygen? no     Are you familiar with the signs and symptoms of worsening COPD? Yes, NCM reviewed with patient       Who do you call with worsening COPD symptoms?  Dr. Mk Aguilera   Do you know when to call with COPD sympto appointment with Glenis Benavidez in Cardiology on 7/27/18          Interventions by JORGE:  NCM reviewed discharge instructions, medications, S&S of infection, smoking cessation, S&S of worsening COPD, when to call the doctor and when to call 911, patient verbalized

## 2018-07-12 NOTE — PAYOR COMM NOTE
--------------  CONTINUED STAY REVIEW      OBS ORDER     (MR # [id number])   Order   Place in observation Once [ADT12] (Order [id number])   Order Barcode     Click to print Order Cover Sheet for scanning    Order Requisition     Place in observation Once (Or

## 2018-07-15 PROBLEM — I70.0 THORACIC AORTA ATHEROSCLEROSIS (HCC): Status: ACTIVE | Noted: 2018-07-15

## 2018-07-15 PROBLEM — J43.2 CENTRILOBULAR EMPHYSEMA (HCC): Status: ACTIVE | Noted: 2018-07-15

## 2018-07-15 PROBLEM — I70.0 THORACIC AORTA ATHEROSCLEROSIS: Status: ACTIVE | Noted: 2018-07-15

## 2018-07-16 PROBLEM — G43.109 MIGRAINE WITH AURA AND WITHOUT STATUS MIGRAINOSUS, NOT INTRACTABLE: Status: ACTIVE | Noted: 2018-07-16

## 2018-07-16 PROBLEM — K57.90 DIVERTICULOSIS OF INTESTINE WITHOUT BLEEDING: Status: ACTIVE | Noted: 2018-07-16

## 2018-07-16 NOTE — PROGRESS NOTES
HPI:    Luis Pryor is a 78year old female here today for hospital follow up.    She was discharged from Inpatient hospital, [facility] to Home   Admit Date: 7/8/18  Discharge Date: 7/9/18  Hospital Discharge Diagnosis:   Atypical cp / CAD/ CO differently: Inhale 1 puff into the lungs 4 (four) times daily as needed.  )   Tiotropium Bromide Monohydrate (SPIRIVA HANDIHALER) 18 MCG Inhalation Cap Inhale 1 capsule (18 mcg total) into the lungs daily.    Fluticasone Propionate (FLONASE) 50 MCG/ACT Arsenio surgical history that includes tonsillectomy; angioplasty (coronary); appendectomy,w othr proc; other surgical history; other surgical history; appendectomy; dawood needle localization w/ specimen 1 site right (Right, 1990); dawood needle localization w/ specime conjunctiva are clear  NECK: supple, no adenopathy, no bruits , no JVD  CHEST: no chest tenderness  LUNGS: clear to auscultation  CARDIO: RRR without murmur  GI: good BS's, no masses, HSM or tenderness  MUSCULOSKELETAL: back is not tender, FROM of the extr

## 2018-07-27 ENCOUNTER — PRIOR ORIGINAL RECORDS (OUTPATIENT)
Dept: OTHER | Age: 68
End: 2018-07-27

## 2018-07-27 ENCOUNTER — MYAURORA ACCOUNT LINK (OUTPATIENT)
Dept: OTHER | Age: 68
End: 2018-07-27

## 2018-07-30 LAB
ALBUMIN: 3.3 G/DL
ALKALINE PHOSPHATATE(ALK PHOS): 75 IU/L
BILIRUBIN TOTAL: 0.7 MG/DL
BUN: 11 MG/DL
CALCIUM: 8.9 MG/DL
CHLORIDE: 104 MEQ/L
CREATININE, SERUM: 0.82 MG/DL
GLUCOSE: 107 MG/DL
HEMATOCRIT: 41.3 %
HEMOGLOBIN: 14.7 G/DL
PLATELETS: 223 K/UL
POTASSIUM, SERUM: 3.9 MEQ/L
PROTEIN, TOTAL: 6.9 G/DL
RED BLOOD COUNT: 4.56 X 10-6/U
SGOT (AST): 13 IU/L
SGPT (ALT): 19 IU/L
SODIUM: 138 MEQ/L
WHITE BLOOD COUNT: 6.6 X 10-3/U

## 2018-12-10 ENCOUNTER — LAB ENCOUNTER (OUTPATIENT)
Dept: LAB | Age: 68
End: 2018-12-10
Attending: FAMILY MEDICINE
Payer: MEDICARE

## 2018-12-10 ENCOUNTER — PRIOR ORIGINAL RECORDS (OUTPATIENT)
Dept: OTHER | Age: 68
End: 2018-12-10

## 2018-12-10 DIAGNOSIS — E78.2 MIXED HYPERLIPIDEMIA: ICD-10-CM

## 2018-12-10 DIAGNOSIS — E03.2 IATROGENIC HYPOTHYROIDISM: ICD-10-CM

## 2018-12-10 DIAGNOSIS — I25.10 CORONARY ATHEROSCLEROSIS OF NATIVE CORONARY ARTERY: Primary | ICD-10-CM

## 2018-12-10 PROCEDURE — 85025 COMPLETE CBC W/AUTO DIFF WBC: CPT

## 2018-12-10 PROCEDURE — 80053 COMPREHEN METABOLIC PANEL: CPT

## 2018-12-10 PROCEDURE — 84443 ASSAY THYROID STIM HORMONE: CPT

## 2018-12-10 PROCEDURE — 36415 COLL VENOUS BLD VENIPUNCTURE: CPT

## 2018-12-10 PROCEDURE — 80061 LIPID PANEL: CPT

## 2018-12-12 ENCOUNTER — PRIOR ORIGINAL RECORDS (OUTPATIENT)
Dept: OTHER | Age: 68
End: 2018-12-12

## 2018-12-12 LAB
ALBUMIN: 3.6 G/DL
ALKALINE PHOSPHATATE(ALK PHOS): 83 IU/L
ALT (SGPT): 17 U/L
AST (SGOT): 9 U/L
BILIRUBIN TOTAL: 0.8 MG/DL
BUN: 12 MG/DL
CALCIUM: 8.9 MG/DL
CHLORIDE: 103 MEQ/L
CHOLESTEROL, TOTAL: 128 MG/DL
CHOLESTEROL, TOTAL: 128 MG/DL
CREATININE, SERUM: 1.01 MG/DL
GLOBULIN: 3.6 G/DL
GLUCOSE: 83 MG/DL
GLUCOSE: 83 MG/DL
HDL CHOLESTEROL: 48 MG/DL
HDL CHOLESTEROL: 48 MG/DL
HEMATOCRIT: 47 %
HEMOGLOBIN: 15.8 G/DL
LDL CHOLESTEROL: 59 MG/DL
LDL CHOLESTEROL: 59 MG/DL
NON-HDL CHOLESTEROL: 80 MG/DL
NON-HDL CHOLESTEROL: 80 MG/DL
PLATELETS: 237 K/UL
POTASSIUM, SERUM: 3.8 MEQ/L
PROTEIN, TOTAL: 7.2 G/DL
RED BLOOD COUNT: 5.02 X 10-6/U
SGOT (AST): 9 IU/L
SGPT (ALT): 17 IU/L
SODIUM: 138 MEQ/L
THYROID STIMULATING HORMONE: 2.74 MLU/L
TRIGLYCERIDES: 104 MG/DL
TRIGLYCERIDES: 104 MG/DL
WHITE BLOOD COUNT: 6 X 10-3/U

## 2018-12-17 ENCOUNTER — MYAURORA ACCOUNT LINK (OUTPATIENT)
Dept: OTHER | Age: 68
End: 2018-12-17

## 2018-12-17 ENCOUNTER — PRIOR ORIGINAL RECORDS (OUTPATIENT)
Dept: OTHER | Age: 68
End: 2018-12-17

## 2018-12-26 ENCOUNTER — OFFICE VISIT (OUTPATIENT)
Dept: FAMILY MEDICINE CLINIC | Facility: CLINIC | Age: 68
End: 2018-12-26
Payer: MEDICARE

## 2018-12-26 VITALS
OXYGEN SATURATION: 97 % | DIASTOLIC BLOOD PRESSURE: 64 MMHG | BODY MASS INDEX: 23.22 KG/M2 | RESPIRATION RATE: 16 BRPM | SYSTOLIC BLOOD PRESSURE: 118 MMHG | HEART RATE: 74 BPM | WEIGHT: 136 LBS | TEMPERATURE: 98 F | HEIGHT: 64 IN

## 2018-12-26 DIAGNOSIS — E55.9 VITAMIN D DEFICIENCY: ICD-10-CM

## 2018-12-26 DIAGNOSIS — E03.9 HYPOTHYROIDISM, UNSPECIFIED TYPE: ICD-10-CM

## 2018-12-26 DIAGNOSIS — J43.2 CENTRILOBULAR EMPHYSEMA (HCC): ICD-10-CM

## 2018-12-26 DIAGNOSIS — Z13.31 DEPRESSION SCREENING: ICD-10-CM

## 2018-12-26 DIAGNOSIS — G43.109 MIGRAINE WITH AURA AND WITHOUT STATUS MIGRAINOSUS, NOT INTRACTABLE: ICD-10-CM

## 2018-12-26 DIAGNOSIS — I73.9 CLAUDICATION (HCC): ICD-10-CM

## 2018-12-26 DIAGNOSIS — Z79.02 PLATELET INHIBITION DUE TO PLAVIX: ICD-10-CM

## 2018-12-26 DIAGNOSIS — I70.0 THORACIC AORTA ATHEROSCLEROSIS (HCC): ICD-10-CM

## 2018-12-26 DIAGNOSIS — I25.10 CORONARY ARTERY DISEASE INVOLVING NATIVE CORONARY ARTERY OF NATIVE HEART WITHOUT ANGINA PECTORIS: ICD-10-CM

## 2018-12-26 DIAGNOSIS — I25.10 CORONARY ARTERY DISEASE INVOLVING NATIVE HEART, ANGINA PRESENCE UNSPECIFIED, UNSPECIFIED VESSEL OR LESION TYPE: ICD-10-CM

## 2018-12-26 DIAGNOSIS — E03.8 OTHER SPECIFIED HYPOTHYROIDISM: ICD-10-CM

## 2018-12-26 DIAGNOSIS — F33.1 MODERATE RECURRENT MAJOR DEPRESSION (HCC): Chronic | ICD-10-CM

## 2018-12-26 DIAGNOSIS — E04.9 GOITER: ICD-10-CM

## 2018-12-26 DIAGNOSIS — Z00.00 ENCOUNTER FOR ANNUAL HEALTH EXAMINATION: Primary | ICD-10-CM

## 2018-12-26 DIAGNOSIS — J44.9 CHRONIC OBSTRUCTIVE PULMONARY DISEASE, UNSPECIFIED COPD TYPE (HCC): ICD-10-CM

## 2018-12-26 DIAGNOSIS — Z12.11 COLON CANCER SCREENING: ICD-10-CM

## 2018-12-26 DIAGNOSIS — F41.9 ANXIETY: ICD-10-CM

## 2018-12-26 DIAGNOSIS — I20.0 UNSTABLE ANGINA (HCC): ICD-10-CM

## 2018-12-26 DIAGNOSIS — Z72.0 TOBACCO ABUSE: ICD-10-CM

## 2018-12-26 DIAGNOSIS — K57.30 DIVERTICULOSIS OF LARGE INTESTINE WITHOUT HEMORRHAGE: ICD-10-CM

## 2018-12-26 DIAGNOSIS — E53.8 VITAMIN B12 DEFICIENCY: ICD-10-CM

## 2018-12-26 DIAGNOSIS — Z12.31 ENCOUNTER FOR SCREENING MAMMOGRAM FOR HIGH-RISK PATIENT: ICD-10-CM

## 2018-12-26 DIAGNOSIS — I20.8 STABLE ANGINA (HCC): ICD-10-CM

## 2018-12-26 PROCEDURE — G0444 DEPRESSION SCREEN ANNUAL: HCPCS | Performed by: FAMILY MEDICINE

## 2018-12-26 PROCEDURE — G0439 PPPS, SUBSEQ VISIT: HCPCS | Performed by: FAMILY MEDICINE

## 2018-12-26 PROCEDURE — 99213 OFFICE O/P EST LOW 20 MIN: CPT | Performed by: FAMILY MEDICINE

## 2018-12-26 RX ORDER — LEVOTHYROXINE SODIUM 75 MCG
75 TABLET ORAL
Qty: 90 TABLET | Refills: 3 | Status: SHIPPED | OUTPATIENT
Start: 2018-12-26 | End: 2019-06-21

## 2018-12-26 NOTE — PROGRESS NOTES
HPI:   Florian Fothergill is a 80year old female who presents for a Medicare Subsequent Annual Wellness visit (Pt already had Initial Annual Wellness).     Pt also here to follow up on   Claudication [facility])     Vitamin D deficiency     Tobacco abuse     Goit counseling today.  (update Vitals or Tob Hx section to marc Tobacco Cessation counseling given as YES and refresh this link)        Ms. Maria G Moraes already takes aspirin and has it on her medication list.   CAGE Alcohol screening   Swift Friday was screened Value Date    WBC 6.0 12/10/2018    HGB 15.8 12/10/2018    .0 12/10/2018        ALLERGIES:   She is allergic to penicillin g; sulfa antibiotics; berries; chicken allergy; chocolate; codeine; dairy products; eggs or egg-derived products; grass; molds pulmonary disease) (5/7/2014), Depression, Diverticulitis, Fall (11/15/2012), Goiter (12/23/2013), Graves disease, Heart attack ([facility] [address] Heart disease, History of appendectomy (8/8/2014), History of hysterectomy (8/8/2014), Hypothyroid, Migraines, Ovarian ma SpO2 97%   BMI 23.34 kg/m²  Estimated body mass index is 23.34 kg/m² as calculated from the following:    Height as of this encounter: 64\". Weight as of this encounter: 136 lb.     Medicare Hearing Assessment  (Required for AWV/SWV)    Whispered Voice Pneumococcal (Prevnar 13) 10/13/2015   • Pneumovax 23 12/23/2013        ASSESSMENT AND OTHER RELEVANT CHRONIC CONDITIONS:   Mariana Linder is a 80year old female who presents for a Medicare Assessment.      PLAN SUMMARY:   Diagnoses and all orders for [facility] cessation   -     OFFICE/OUTPT VISIT,EST,LEVL III  -     COMP METABOLIC PANEL (14);  Future    Vitamin D deficiency- check level   -     OFFICE/OUTPT VISIT,EST,LEVL III  -     VITAMIN D, 25-HYDROXY; Future    Depression screening  -     DEPRESSION SCREEN AN Colonoscopy Screen every 10 years Colonoscopy due on 11/17/1950 Update [facility] if applicable    Flex Sigmoidoscopy Screen every 10 years No results found for this or any previous visit. No flowsheet data found.      Fecal Occult Blood Annually No covered by Medicare Part B No vaccine history found This may be covered with your pharmacy  prescription benefits      SPECIFIC DISEASE MONITORING Internal Lab or Procedure External Lab or Procedure            COPD      Spirometry Testing Annually Spiromet

## 2018-12-26 NOTE — PATIENT INSTRUCTIONS
Pao Rendon's SCREENING SCHEDULE   Tests on this list are recommended by your physician but may not be covered, or covered at this frequency, by your insurer. Please check with your insurance carrier before scheduling to verify coverage.    PREVENTATI years old and have smoked more than 100 cigarettes in their lifetime   • Anyone with a family history    Colorectal Cancer Screening  Covered up to Age 76     Colonoscopy Screen   Covered every 10 years- more often if abnormal Colonoscopy due on 11/17/1950 Please get every year    Pneumococcal 13 (Prevnar)  Covered Once after 65 Orders placed or performed in visit on 10/13/15   • PNEUMOCOCCAL VACC, 13 NILDA IM    Please get once after your 65th birthday    Pneumococcal 23 (Pneumovax)  Covered Once after [address]

## 2019-02-19 ENCOUNTER — HOSPITAL ENCOUNTER (OUTPATIENT)
Dept: ULTRASOUND IMAGING | Age: 69
Discharge: HOME | End: 2019-02-19
Attending: FAMILY MEDICINE
Payer: MEDICARE

## 2019-02-19 ENCOUNTER — HOSPITAL ENCOUNTER (OUTPATIENT)
Dept: MAMMOGRAPHY | Age: 69
Discharge: HOME | End: 2019-02-19
Attending: FAMILY MEDICINE
Payer: MEDICARE

## 2019-02-19 DIAGNOSIS — Z12.31 ENCOUNTER FOR SCREENING MAMMOGRAM FOR HIGH-RISK PATIENT: ICD-10-CM

## 2019-02-19 DIAGNOSIS — E04.9 GOITER: ICD-10-CM

## 2019-02-19 PROCEDURE — 77067 SCR MAMMO BI INCL CAD: CPT | Performed by: FAMILY MEDICINE

## 2019-02-19 PROCEDURE — 76536 US EXAM OF HEAD AND NECK: CPT | Performed by: FAMILY MEDICINE

## 2019-02-19 PROCEDURE — 77063 BREAST TOMOSYNTHESIS BI: CPT | Performed by: FAMILY MEDICINE

## 2019-02-28 VITALS
BODY MASS INDEX: 21.69 KG/M2 | HEART RATE: 75 BPM | SYSTOLIC BLOOD PRESSURE: 104 MMHG | HEIGHT: 66 IN | DIASTOLIC BLOOD PRESSURE: 60 MMHG | WEIGHT: 135 LBS

## 2019-02-28 VITALS
DIASTOLIC BLOOD PRESSURE: 52 MMHG | HEIGHT: 66 IN | SYSTOLIC BLOOD PRESSURE: 108 MMHG | BODY MASS INDEX: 21.69 KG/M2 | HEART RATE: 70 BPM | WEIGHT: 135 LBS

## 2019-02-28 VITALS
WEIGHT: 136 LBS | HEART RATE: 87 BPM | HEIGHT: 64 IN | DIASTOLIC BLOOD PRESSURE: 58 MMHG | OXYGEN SATURATION: 97 % | BODY MASS INDEX: 23.22 KG/M2 | SYSTOLIC BLOOD PRESSURE: 100 MMHG

## 2019-02-28 VITALS
HEART RATE: 78 BPM | BODY MASS INDEX: 23.22 KG/M2 | DIASTOLIC BLOOD PRESSURE: 60 MMHG | WEIGHT: 136 LBS | SYSTOLIC BLOOD PRESSURE: 100 MMHG | HEIGHT: 64 IN

## 2019-02-28 VITALS
HEIGHT: 66 IN | BODY MASS INDEX: 21.69 KG/M2 | DIASTOLIC BLOOD PRESSURE: 58 MMHG | WEIGHT: 135 LBS | SYSTOLIC BLOOD PRESSURE: 104 MMHG | HEART RATE: 68 BPM

## 2019-03-01 VITALS
WEIGHT: 135 LBS | BODY MASS INDEX: 21.69 KG/M2 | DIASTOLIC BLOOD PRESSURE: 64 MMHG | SYSTOLIC BLOOD PRESSURE: 122 MMHG | HEART RATE: 72 BPM | HEIGHT: 66 IN

## 2019-04-01 RX ORDER — ALPRAZOLAM 0.5 MG/1
TABLET ORAL
COMMUNITY

## 2019-04-01 RX ORDER — BUTALBITAL, ACETAMINOPHEN AND CAFFEINE 50; 325; 40 MG/1; MG/1; MG/1
TABLET ORAL
COMMUNITY

## 2019-04-01 RX ORDER — LEVOTHYROXINE SODIUM 0.07 MG/1
TABLET ORAL
COMMUNITY

## 2019-04-01 RX ORDER — CLOPIDOGREL BISULFATE 75 MG/1
TABLET ORAL
COMMUNITY
End: 2019-06-03 | Stop reason: SDUPTHER

## 2019-04-01 RX ORDER — METOPROLOL SUCCINATE 25 MG/1
TABLET, EXTENDED RELEASE ORAL
COMMUNITY
End: 2019-06-03 | Stop reason: SDUPTHER

## 2019-04-01 RX ORDER — NITROGLYCERIN 0.4 MG/1
TABLET SUBLINGUAL
COMMUNITY
End: 2019-12-10 | Stop reason: SDUPTHER

## 2019-04-01 RX ORDER — SIMVASTATIN 10 MG
TABLET ORAL
COMMUNITY
End: 2019-06-03 | Stop reason: SDUPTHER

## 2019-05-17 ENCOUNTER — LAB ENCOUNTER (OUTPATIENT)
Dept: LAB | Age: 69
End: 2019-05-17
Attending: INTERNAL MEDICINE
Payer: MEDICARE

## 2019-05-17 DIAGNOSIS — J43.2 CENTRILOBULAR EMPHYSEMA (HCC): ICD-10-CM

## 2019-05-17 DIAGNOSIS — E04.9 GOITER: ICD-10-CM

## 2019-05-17 DIAGNOSIS — E55.9 VITAMIN D DEFICIENCY: ICD-10-CM

## 2019-05-17 DIAGNOSIS — I70.0 THORACIC AORTA ATHEROSCLEROSIS (HCC): ICD-10-CM

## 2019-05-17 DIAGNOSIS — E53.8 VITAMIN B12 DEFICIENCY: ICD-10-CM

## 2019-05-17 DIAGNOSIS — Z72.0 TOBACCO ABUSE: ICD-10-CM

## 2019-05-17 LAB
ALBUMIN SERPL-MCNC: 3.6 G/DL
ALBUMIN/GLOB SERPL: NORMAL {RATIO}
ALP SERPL-CCNC: 73 U/L
ALT SERPL-CCNC: 15 U/L
ANION GAP SERPL CALC-SCNC: NORMAL MMOL/L
AST SERPL-CCNC: 11 U/L
BILIRUB SERPL-MCNC: 0.6 MG/DL
BUN SERPL-MCNC: 10 MG/DL
BUN/CREAT SERPL: NORMAL
CALCIUM SERPL-MCNC: 9.2 MG/DL
CHLORIDE SERPL-SCNC: 105 MMOL/L
CHOLEST SERPL-MCNC: 133 MG/DL
CHOLEST/HDLC SERPL: NORMAL {RATIO}
CO2 SERPL-SCNC: NORMAL MMOL/L
CREAT SERPL-MCNC: 0.78 MG/DL
GLOBULIN SER-MCNC: 3.5 G/DL
GLUCOSE SERPL-MCNC: 86 MG/DL
HDLC SERPL-MCNC: 57 MG/DL
LDLC SERPL CALC-MCNC: 53 MG/DL
LENGTH OF FAST TIME PATIENT: NORMAL H
LENGTH OF FAST TIME PATIENT: NORMAL H
NONHDLC SERPL-MCNC: 76 MG/DL
POTASSIUM SERPL-SCNC: 3.6 MMOL/L
PROT SERPL-MCNC: 7.1 G/DL
SODIUM SERPL-SCNC: 138 MMOL/L
T4 FREE SERPL-MCNC: 1.1 NG/DL
TRIGL SERPL-MCNC: 116 MG/DL
TSH SERPL-ACNC: 3.26 M[IU]/L
VLDLC SERPL CALC-MCNC: NORMAL MG/DL

## 2019-05-17 PROCEDURE — 80061 LIPID PANEL: CPT

## 2019-05-17 PROCEDURE — 84443 ASSAY THYROID STIM HORMONE: CPT

## 2019-05-17 PROCEDURE — 82306 VITAMIN D 25 HYDROXY: CPT

## 2019-05-17 PROCEDURE — 36415 COLL VENOUS BLD VENIPUNCTURE: CPT

## 2019-05-17 PROCEDURE — 84439 ASSAY OF FREE THYROXINE: CPT

## 2019-05-17 PROCEDURE — 85025 COMPLETE CBC W/AUTO DIFF WBC: CPT

## 2019-05-17 PROCEDURE — 82607 VITAMIN B-12: CPT

## 2019-05-17 PROCEDURE — 80053 COMPREHEN METABOLIC PANEL: CPT

## 2019-05-28 ENCOUNTER — CLINICAL ABSTRACT (OUTPATIENT)
Dept: CARDIOLOGY | Age: 69
End: 2019-05-28

## 2019-05-31 RX ORDER — ISOSORBIDE MONONITRATE 30 MG/1
1 TABLET, EXTENDED RELEASE ORAL DAILY
COMMUNITY
Start: 2015-05-21 | End: 2019-12-01 | Stop reason: ALTCHOICE

## 2019-06-03 ENCOUNTER — OFFICE VISIT (OUTPATIENT)
Dept: CARDIOLOGY | Age: 69
End: 2019-06-03

## 2019-06-03 ENCOUNTER — TELEPHONE (OUTPATIENT)
Dept: CARDIOLOGY | Age: 69
End: 2019-06-03

## 2019-06-03 DIAGNOSIS — F17.210 CIGARETTE NICOTINE DEPENDENCE WITHOUT COMPLICATION: ICD-10-CM

## 2019-06-03 DIAGNOSIS — E78.2 HYPERLIPIDEMIA, MIXED: ICD-10-CM

## 2019-06-03 DIAGNOSIS — R09.89 CAROTID BRUIT, UNSPECIFIED LATERALITY: ICD-10-CM

## 2019-06-03 DIAGNOSIS — E55.9 VITAMIN D DEFICIENCY: ICD-10-CM

## 2019-06-03 DIAGNOSIS — Z72.0 TOBACCO ABUSE: ICD-10-CM

## 2019-06-03 DIAGNOSIS — J44.9 CHRONIC OBSTRUCTIVE PULMONARY DISEASE, UNSPECIFIED COPD TYPE (CMD): ICD-10-CM

## 2019-06-03 DIAGNOSIS — F41.9 ANXIETY: Primary | ICD-10-CM

## 2019-06-03 DIAGNOSIS — I25.10 CORONARY ARTERY DISEASE INVOLVING NATIVE HEART WITHOUT ANGINA PECTORIS, UNSPECIFIED VESSEL OR LESION TYPE: ICD-10-CM

## 2019-06-03 DIAGNOSIS — I70.0 THORACIC AORTA ATHEROSCLEROSIS (CMD): ICD-10-CM

## 2019-06-03 DIAGNOSIS — I65.23 OCCLUSION AND STENOSIS OF BILATERAL CAROTID ARTERIES: ICD-10-CM

## 2019-06-03 DIAGNOSIS — J43.2 CENTRILOBULAR EMPHYSEMA (CMD): ICD-10-CM

## 2019-06-03 PROCEDURE — 99214 OFFICE O/P EST MOD 30 MIN: CPT | Performed by: INTERNAL MEDICINE

## 2019-06-03 RX ORDER — METOPROLOL SUCCINATE 25 MG/1
25 TABLET, EXTENDED RELEASE ORAL DAILY
Qty: 90 TABLET | Refills: 1 | Status: SHIPPED | OUTPATIENT
Start: 2019-06-03 | End: 2019-06-04 | Stop reason: SDUPTHER

## 2019-06-03 RX ORDER — CLOPIDOGREL BISULFATE 75 MG/1
75 TABLET ORAL DAILY
Qty: 90 TABLET | Refills: 1 | Status: SHIPPED | OUTPATIENT
Start: 2019-06-03 | End: 2019-12-10 | Stop reason: SDUPTHER

## 2019-06-03 RX ORDER — SIMVASTATIN 10 MG
10 TABLET ORAL NIGHTLY
Qty: 90 TABLET | Refills: 1 | Status: SHIPPED | OUTPATIENT
Start: 2019-06-03 | End: 2020-03-07 | Stop reason: SDUPTHER

## 2019-06-03 SDOH — HEALTH STABILITY: PHYSICAL HEALTH: ON AVERAGE, HOW MANY MINUTES DO YOU ENGAGE IN EXERCISE AT THIS LEVEL?: 0 MIN

## 2019-06-03 SDOH — HEALTH STABILITY: PHYSICAL HEALTH: ON AVERAGE, HOW MANY DAYS PER WEEK DO YOU ENGAGE IN MODERATE TO STRENUOUS EXERCISE (LIKE A BRISK WALK)?: 0 DAYS

## 2019-06-03 ASSESSMENT — PAIN SCALES - GENERAL: PAINLEVEL: 0

## 2019-06-04 RX ORDER — METOPROLOL SUCCINATE 25 MG/1
12.5 TABLET, EXTENDED RELEASE ORAL DAILY
Qty: 45 TABLET | Refills: 1 | Status: SHIPPED | OUTPATIENT
Start: 2019-06-04 | End: 2019-12-10 | Stop reason: SDUPTHER

## 2019-06-21 ENCOUNTER — OFFICE VISIT (OUTPATIENT)
Dept: FAMILY MEDICINE CLINIC | Facility: CLINIC | Age: 69
End: 2019-06-21
Payer: COMMERCIAL

## 2019-06-21 VITALS
DIASTOLIC BLOOD PRESSURE: 78 MMHG | WEIGHT: 135 LBS | SYSTOLIC BLOOD PRESSURE: 118 MMHG | HEART RATE: 86 BPM | RESPIRATION RATE: 16 BRPM | HEIGHT: 64 IN | OXYGEN SATURATION: 96 % | BODY MASS INDEX: 23.05 KG/M2 | TEMPERATURE: 98 F

## 2019-06-21 DIAGNOSIS — J44.9 CHRONIC OBSTRUCTIVE PULMONARY DISEASE, UNSPECIFIED COPD TYPE (HCC): ICD-10-CM

## 2019-06-21 DIAGNOSIS — E03.9 HYPOTHYROIDISM, UNSPECIFIED TYPE: Primary | ICD-10-CM

## 2019-06-21 PROCEDURE — 99213 OFFICE O/P EST LOW 20 MIN: CPT | Performed by: FAMILY MEDICINE

## 2019-06-21 RX ORDER — LEVOTHYROXINE SODIUM 75 MCG
75 TABLET ORAL
Qty: 90 TABLET | Refills: 3 | Status: SHIPPED | OUTPATIENT
Start: 2019-06-21 | End: 2020-06-30

## 2019-06-21 NOTE — PROGRESS NOTES
HPI:   Diana Shen is a 80year old female here to follow up on COPD and hypothyroid     Pt is still smoking. Pt still not using inhalers reguraly    Discussed maintenance inhalers and rescue inhalers.    Pt not using combivent   Pt has an egg allergy MG Oral Tablet 24 Hr  Disp:  Rfl: 2      Past Medical History:   Diagnosis Date   • Colon cancer screening 12/23/2013   • COPD (chronic obstructive pulmonary disease) 5/7/2014   • Depression    • Diverticulitis    • Fall 11/15/2012   • Goiter 12/23/2013 lesions  EYES:denies blurred vision or double vision  HEENT: denies nasal congestion, sinus pain or ST  LUNGS: denies shortness of breath with exertion  CARDIOVASCULAR: denies chest pain on exertion  GI: denies abdominal pain,denies heartburn  : denies d

## 2019-10-03 ENCOUNTER — MA CHART PREP (OUTPATIENT)
Dept: FAMILY MEDICINE CLINIC | Facility: CLINIC | Age: 69
End: 2019-10-03

## 2019-11-19 ENCOUNTER — LAB ENCOUNTER (OUTPATIENT)
Dept: LAB | Age: 69
End: 2019-11-19
Attending: FAMILY MEDICINE
Payer: MEDICARE

## 2019-11-19 DIAGNOSIS — E03.9 HYPOTHYROIDISM, UNSPECIFIED TYPE: ICD-10-CM

## 2019-11-19 DIAGNOSIS — E55.9 VITAMIN D DEFICIENCY DISEASE: Primary | ICD-10-CM

## 2019-11-19 DIAGNOSIS — E78.2 MIXED HYPERLIPIDEMIA: ICD-10-CM

## 2019-11-19 LAB
ALBUMIN SERPL-MCNC: 3.8 G/DL
ALBUMIN/GLOB SERPL: NORMAL {RATIO}
ALP SERPL-CCNC: 75 U/L
ALT SERPL-CCNC: 16 U/L
ANION GAP SERPL CALC-SCNC: NORMAL MMOL/L
AST SERPL-CCNC: 16 U/L
BILIRUB SERPL-MCNC: 0.7 MG/DL
BUN SERPL-MCNC: 10 MG/DL
BUN/CREAT SERPL: NORMAL
CALCIUM SERPL-MCNC: 8.8 MG/DL
CHLORIDE SERPL-SCNC: 106 MMOL/L
CHOLEST SERPL-MCNC: 141 MG/DL
CHOLEST/HDLC SERPL: NORMAL {RATIO}
CO2 SERPL-SCNC: NORMAL MMOL/L
CREAT SERPL-MCNC: 0.84 MG/DL
GLOBULIN SER-MCNC: 3.6 G/DL
GLUCOSE SERPL-MCNC: 86 MG/DL
HDLC SERPL-MCNC: 53 MG/DL
LDLC SERPL CALC-MCNC: 62 MG/DL
LENGTH OF FAST TIME PATIENT: NORMAL H
LENGTH OF FAST TIME PATIENT: NORMAL H
NONHDLC SERPL-MCNC: NORMAL MG/DL
POTASSIUM SERPL-SCNC: 3.7 MMOL/L
PROT SERPL-MCNC: 7.4 G/DL
SODIUM SERPL-SCNC: 139 MMOL/L
TRIGL SERPL-MCNC: 129 MG/DL
VLDLC SERPL CALC-MCNC: NORMAL MG/DL

## 2019-11-19 PROCEDURE — 84443 ASSAY THYROID STIM HORMONE: CPT

## 2019-11-19 PROCEDURE — 80053 COMPREHEN METABOLIC PANEL: CPT

## 2019-11-19 PROCEDURE — 36415 COLL VENOUS BLD VENIPUNCTURE: CPT

## 2019-11-19 PROCEDURE — 80061 LIPID PANEL: CPT

## 2019-11-19 PROCEDURE — 84439 ASSAY OF FREE THYROXINE: CPT

## 2019-11-20 ENCOUNTER — E-ADVICE (OUTPATIENT)
Dept: CARDIOLOGY | Age: 69
End: 2019-11-20

## 2019-12-03 ENCOUNTER — CLINICAL ABSTRACT (OUTPATIENT)
Dept: CARDIOLOGY | Age: 69
End: 2019-12-03

## 2019-12-09 ENCOUNTER — APPOINTMENT (OUTPATIENT)
Dept: CARDIOLOGY | Age: 69
End: 2019-12-09

## 2019-12-10 ENCOUNTER — OFFICE VISIT (OUTPATIENT)
Dept: CARDIOLOGY | Age: 69
End: 2019-12-10

## 2019-12-10 VITALS
HEIGHT: 64 IN | BODY MASS INDEX: 23.39 KG/M2 | SYSTOLIC BLOOD PRESSURE: 120 MMHG | HEART RATE: 83 BPM | WEIGHT: 137 LBS | DIASTOLIC BLOOD PRESSURE: 75 MMHG

## 2019-12-10 DIAGNOSIS — E78.2 HYPERLIPIDEMIA, MIXED: ICD-10-CM

## 2019-12-10 DIAGNOSIS — I65.23 OCCLUSION AND STENOSIS OF BILATERAL CAROTID ARTERIES: ICD-10-CM

## 2019-12-10 DIAGNOSIS — F41.9 ANXIETY: Primary | ICD-10-CM

## 2019-12-10 DIAGNOSIS — J43.2 CENTRILOBULAR EMPHYSEMA (CMD): ICD-10-CM

## 2019-12-10 DIAGNOSIS — R53.82 CHRONIC FATIGUE: ICD-10-CM

## 2019-12-10 DIAGNOSIS — E55.9 VITAMIN D DEFICIENCY: ICD-10-CM

## 2019-12-10 DIAGNOSIS — J44.9 CHRONIC OBSTRUCTIVE PULMONARY DISEASE, UNSPECIFIED COPD TYPE (CMD): ICD-10-CM

## 2019-12-10 DIAGNOSIS — I25.10 CORONARY ARTERY DISEASE INVOLVING NATIVE HEART WITHOUT ANGINA PECTORIS, UNSPECIFIED VESSEL OR LESION TYPE: ICD-10-CM

## 2019-12-10 DIAGNOSIS — Z72.0 TOBACCO ABUSE: ICD-10-CM

## 2019-12-10 DIAGNOSIS — F17.210 CIGARETTE NICOTINE DEPENDENCE WITHOUT COMPLICATION: ICD-10-CM

## 2019-12-10 DIAGNOSIS — I70.0 THORACIC AORTA ATHEROSCLEROSIS (CMD): ICD-10-CM

## 2019-12-10 DIAGNOSIS — R09.89 CAROTID BRUIT, UNSPECIFIED LATERALITY: ICD-10-CM

## 2019-12-10 PROCEDURE — 99214 OFFICE O/P EST MOD 30 MIN: CPT | Performed by: INTERNAL MEDICINE

## 2019-12-10 RX ORDER — NITROGLYCERIN 0.4 MG/1
TABLET SUBLINGUAL
Qty: 25 TABLET | Refills: 0 | Status: SHIPPED | OUTPATIENT
Start: 2019-12-10

## 2019-12-10 RX ORDER — CLOPIDOGREL BISULFATE 75 MG/1
75 TABLET ORAL DAILY
Qty: 90 TABLET | Refills: 3 | Status: SHIPPED | OUTPATIENT
Start: 2019-12-10 | End: 2021-01-06

## 2019-12-10 RX ORDER — METOPROLOL SUCCINATE 25 MG/1
12.5 TABLET, EXTENDED RELEASE ORAL DAILY
Qty: 45 TABLET | Refills: 3 | Status: SHIPPED | OUTPATIENT
Start: 2019-12-10 | End: 2020-12-09

## 2019-12-10 SDOH — HEALTH STABILITY: PHYSICAL HEALTH: ON AVERAGE, HOW MANY MINUTES DO YOU ENGAGE IN EXERCISE AT THIS LEVEL?: 0 MIN

## 2019-12-10 SDOH — HEALTH STABILITY: PHYSICAL HEALTH: ON AVERAGE, HOW MANY DAYS PER WEEK DO YOU ENGAGE IN MODERATE TO STRENUOUS EXERCISE (LIKE A BRISK WALK)?: 0 DAYS

## 2019-12-10 ASSESSMENT — PATIENT HEALTH QUESTIONNAIRE - PHQ9
2. FEELING DOWN, DEPRESSED OR HOPELESS: NOT AT ALL
SUM OF ALL RESPONSES TO PHQ9 QUESTIONS 1 AND 2: 0
SUM OF ALL RESPONSES TO PHQ9 QUESTIONS 1 AND 2: 0
1. LITTLE INTEREST OR PLEASURE IN DOING THINGS: NOT AT ALL

## 2019-12-18 ENCOUNTER — OFFICE VISIT (OUTPATIENT)
Dept: FAMILY MEDICINE CLINIC | Facility: CLINIC | Age: 69
End: 2019-12-18
Payer: COMMERCIAL

## 2019-12-18 VITALS
BODY MASS INDEX: 23.97 KG/M2 | TEMPERATURE: 98 F | RESPIRATION RATE: 16 BRPM | OXYGEN SATURATION: 95 % | DIASTOLIC BLOOD PRESSURE: 78 MMHG | SYSTOLIC BLOOD PRESSURE: 116 MMHG | WEIGHT: 137 LBS | HEIGHT: 63.5 IN | HEART RATE: 75 BPM

## 2019-12-18 DIAGNOSIS — I20.8 STABLE ANGINA (HCC): ICD-10-CM

## 2019-12-18 DIAGNOSIS — K57.30 DIVERTICULOSIS OF LARGE INTESTINE WITHOUT HEMORRHAGE: ICD-10-CM

## 2019-12-18 DIAGNOSIS — Z72.0 TOBACCO ABUSE: ICD-10-CM

## 2019-12-18 DIAGNOSIS — F33.1 MODERATE RECURRENT MAJOR DEPRESSION (HCC): Chronic | ICD-10-CM

## 2019-12-18 DIAGNOSIS — Z12.11 COLON CANCER SCREENING: ICD-10-CM

## 2019-12-18 DIAGNOSIS — J43.2 CENTRILOBULAR EMPHYSEMA (HCC): ICD-10-CM

## 2019-12-18 DIAGNOSIS — J44.9 CHRONIC OBSTRUCTIVE PULMONARY DISEASE, UNSPECIFIED COPD TYPE (HCC): ICD-10-CM

## 2019-12-18 DIAGNOSIS — I25.10 CORONARY ARTERY DISEASE INVOLVING NATIVE CORONARY ARTERY OF NATIVE HEART WITHOUT ANGINA PECTORIS: ICD-10-CM

## 2019-12-18 DIAGNOSIS — Z12.31 ENCOUNTER FOR SCREENING MAMMOGRAM FOR HIGH-RISK PATIENT: ICD-10-CM

## 2019-12-18 DIAGNOSIS — I25.10 CORONARY ARTERY DISEASE INVOLVING NATIVE HEART, ANGINA PRESENCE UNSPECIFIED, UNSPECIFIED VESSEL OR LESION TYPE: ICD-10-CM

## 2019-12-18 DIAGNOSIS — F41.9 ANXIETY: ICD-10-CM

## 2019-12-18 DIAGNOSIS — G43.109 MIGRAINE WITH AURA AND WITHOUT STATUS MIGRAINOSUS, NOT INTRACTABLE: ICD-10-CM

## 2019-12-18 DIAGNOSIS — Z79.02 PLATELET INHIBITION DUE TO PLAVIX: ICD-10-CM

## 2019-12-18 DIAGNOSIS — Z78.0 ASYMPTOMATIC MENOPAUSE: ICD-10-CM

## 2019-12-18 DIAGNOSIS — Z13.820 SCREENING FOR OSTEOPOROSIS: ICD-10-CM

## 2019-12-18 DIAGNOSIS — I70.0 THORACIC AORTA ATHEROSCLEROSIS (HCC): ICD-10-CM

## 2019-12-18 DIAGNOSIS — E55.9 VITAMIN D DEFICIENCY: ICD-10-CM

## 2019-12-18 DIAGNOSIS — Z00.00 ENCOUNTER FOR ANNUAL HEALTH EXAMINATION: Primary | ICD-10-CM

## 2019-12-18 DIAGNOSIS — E03.8 OTHER SPECIFIED HYPOTHYROIDISM: ICD-10-CM

## 2019-12-18 DIAGNOSIS — I73.9 CLAUDICATION (HCC): ICD-10-CM

## 2019-12-18 DIAGNOSIS — E04.9 GOITER: ICD-10-CM

## 2019-12-18 PROCEDURE — G0439 PPPS, SUBSEQ VISIT: HCPCS | Performed by: FAMILY MEDICINE

## 2019-12-18 PROCEDURE — 90471 IMMUNIZATION ADMIN: CPT | Performed by: FAMILY MEDICINE

## 2019-12-18 PROCEDURE — 90732 PPSV23 VACC 2 YRS+ SUBQ/IM: CPT | Performed by: FAMILY MEDICINE

## 2019-12-18 PROCEDURE — 99213 OFFICE O/P EST LOW 20 MIN: CPT | Performed by: FAMILY MEDICINE

## 2019-12-18 NOTE — PATIENT INSTRUCTIONS
Pao Rendon's SCREENING SCHEDULE   Tests on this list are recommended by your physician but may not be covered, or covered at this frequency, by your insurer. Please check with your insurance carrier before scheduling to verify coverage.    PREVENTATI years old and have smoked more than 100 cigarettes in their lifetime   • Anyone with a family history    Colorectal Cancer Screening  Covered up to Age 76     Colonoscopy Screen   Covered every 10 years- more often if abnormal Colonoscopy due on 11/17/2001 Please get every year    Pneumococcal 13 (Prevnar)  Covered Once after 65 Orders placed or performed in visit on 10/13/15   • PNEUMOCOCCAL VACC, 13 NILDA IM    Please get once after your 65th birthday    Pneumococcal 23 (Pneumovax)  Covered Once after [address]

## 2019-12-18 NOTE — PROGRESS NOTES
HPI:   Jacqueline Cervantes is a 77year old female who presents for a Medicare Subsequent Annual Wellness visit (Pt already had Initial Annual Wellness).     Pt here also to discuss      Vitamin D deficiency     Tobacco abuse     Goiter     Platelet inhibitio History    Tobacco Use      Smoking status: Current Every Day Smoker        Packs/day: 1.00        Types: Cigarettes      Smokeless tobacco: Never Used     This is a tobacco user, and I will give tobacco cessation counseling today.  (update Vitals or Tob Hx Component Value Date    AST 16 11/19/2019    ALT 16 11/19/2019    CA 8.8 11/19/2019    ALB 3.8 11/19/2019    TSH 3.550 11/19/2019    CREATSERUM 0.84 11/19/2019    GLU 86 11/19/2019        CBC  (most recent labs)   Lab Results   Component Value Date    WB Colon cancer screening (12/23/2013), COPD (chronic obstructive pulmonary disease) (5/7/2014), Depression, Diverticulitis, Fall (11/15/2012), Goiter (12/23/2013), Graves disease, Heart attack ([facility] [address] Heart disease, History of appendectomy (8/8/2014), History °F (36.6 °C) (Oral)   Resp 16   Ht 63.5\"   Wt 137 lb (62.1 kg)   SpO2 95%   BMI 23.89 kg/m²  Estimated body mass index is 23.89 kg/m² as calculated from the following:    Height as of this encounter: 63.5\".     Weight as of this encounter: 137 lb (62.1 kg without dominant masses    Vaccination History     Immunization History   Administered Date(s) Administered   • Pneumococcal (Prevnar 13) 10/13/2015   • Pneumovax 23 12/23/2013        ASSESSMENT AND OTHER RELEVANT CHRONIC CONDITIONS:   Monica Zuniga is a monitor   -     OFFICE/OUTPT VISIT,EST,LEVL III         Diet assessment: good     PLAN:  The patient indicates understanding of these issues and agrees to the plan. Reinforced healthy diet, lifestyle, and exercise. Return in 6 months (on 6/18/2020). Screening Mammogram      Mammogram Annually to 77, then as discussed Mammogram due on 02/19/2020 Update Health Maintenance if applicable     Immunizations (Update Immunization Activity if applicable)     Influenza  Covered Annually  Please get every year

## 2020-01-01 ENCOUNTER — EXTERNAL RECORD (OUTPATIENT)
Dept: HEALTH INFORMATION MANAGEMENT | Facility: OTHER | Age: 70
End: 2020-01-01

## 2020-01-02 ENCOUNTER — TELEPHONE (OUTPATIENT)
Dept: CARDIOLOGY | Age: 70
End: 2020-01-02

## 2020-01-09 ENCOUNTER — TELEPHONE (OUTPATIENT)
Dept: CARDIOLOGY | Age: 70
End: 2020-01-09

## 2020-01-17 ENCOUNTER — PATIENT MESSAGE (OUTPATIENT)
Dept: FAMILY MEDICINE CLINIC | Facility: CLINIC | Age: 70
End: 2020-01-17

## 2020-01-18 NOTE — TELEPHONE ENCOUNTER
From: Renato Shore  To: Faith Burt, DO  Sent: 1/17/2020 12:50 PM CST  Subject: Other    Thank you for your reminder letter. I didn’t forget! My last mammogram was 2/19/2019 so I’m not due for another until 2/20/2020 or my insurance won’t cover it.  I w

## 2020-01-29 ENCOUNTER — TELEPHONE (OUTPATIENT)
Dept: CARDIOLOGY | Age: 70
End: 2020-01-29

## 2020-03-09 RX ORDER — SIMVASTATIN 10 MG
TABLET ORAL
Qty: 90 TABLET | Refills: 3 | Status: SHIPPED | OUTPATIENT
Start: 2020-03-09 | End: 2021-03-31

## 2020-03-18 ENCOUNTER — PATIENT MESSAGE (OUTPATIENT)
Dept: FAMILY MEDICINE CLINIC | Facility: CLINIC | Age: 70
End: 2020-03-18

## 2020-03-18 RX ORDER — BUDESONIDE AND FORMOTEROL FUMARATE DIHYDRATE 160; 4.5 UG/1; UG/1
2 AEROSOL RESPIRATORY (INHALATION) 2 TIMES DAILY
Qty: 1 INHALER | Refills: 1 | Status: SHIPPED | OUTPATIENT
Start: 2020-03-18

## 2020-03-18 NOTE — TELEPHONE ENCOUNTER
From: Kevin Magaña  To: Malon Crigler, DO  Sent: 3/18/2020 12:35 AM CDT  Subject: Prescription Question    Dear Dr. Kiki Christina,  I am really sorry for bothering you & your staff during this crisis.  However, I need your assistance & hopefully this request wo

## 2020-06-15 ENCOUNTER — HOSPITAL ENCOUNTER (OUTPATIENT)
Dept: BONE DENSITY | Age: 70
Discharge: HOME | End: 2020-06-15
Attending: FAMILY MEDICINE
Payer: MEDICARE

## 2020-06-15 ENCOUNTER — HOSPITAL ENCOUNTER (OUTPATIENT)
Dept: MAMMOGRAPHY | Age: 70
Discharge: HOME | End: 2020-06-15
Attending: FAMILY MEDICINE
Payer: MEDICARE

## 2020-06-15 DIAGNOSIS — Z12.31 ENCOUNTER FOR SCREENING MAMMOGRAM FOR HIGH-RISK PATIENT: ICD-10-CM

## 2020-06-15 DIAGNOSIS — Z13.820 SCREENING FOR OSTEOPOROSIS: ICD-10-CM

## 2020-06-15 DIAGNOSIS — Z78.0 ASYMPTOMATIC MENOPAUSE: ICD-10-CM

## 2020-06-15 PROCEDURE — 77067 SCR MAMMO BI INCL CAD: CPT | Performed by: FAMILY MEDICINE

## 2020-06-15 PROCEDURE — 77063 BREAST TOMOSYNTHESIS BI: CPT | Performed by: FAMILY MEDICINE

## 2020-06-15 PROCEDURE — 77080 DXA BONE DENSITY AXIAL: CPT | Performed by: FAMILY MEDICINE

## 2020-06-17 ENCOUNTER — LABORATORY ENCOUNTER (OUTPATIENT)
Dept: LAB | Age: 70
End: 2020-06-17
Attending: FAMILY MEDICINE
Payer: MEDICARE

## 2020-06-17 DIAGNOSIS — E78.00 PURE HYPERCHOLESTEROLEMIA: ICD-10-CM

## 2020-06-17 DIAGNOSIS — I10 ESSENTIAL HYPERTENSION, MALIGNANT: Primary | ICD-10-CM

## 2020-06-17 PROCEDURE — 84443 ASSAY THYROID STIM HORMONE: CPT

## 2020-06-17 PROCEDURE — 80053 COMPREHEN METABOLIC PANEL: CPT

## 2020-06-17 PROCEDURE — 80061 LIPID PANEL: CPT

## 2020-06-17 PROCEDURE — 36415 COLL VENOUS BLD VENIPUNCTURE: CPT

## 2020-06-23 ENCOUNTER — V-VISIT (OUTPATIENT)
Dept: CARDIOLOGY | Age: 70
End: 2020-06-23

## 2020-06-23 VITALS
DIASTOLIC BLOOD PRESSURE: 76 MMHG | BODY MASS INDEX: 24.2 KG/M2 | WEIGHT: 141 LBS | SYSTOLIC BLOOD PRESSURE: 116 MMHG | HEART RATE: 69 BPM

## 2020-06-23 DIAGNOSIS — R09.89 CAROTID BRUIT, UNSPECIFIED LATERALITY: ICD-10-CM

## 2020-06-23 DIAGNOSIS — E55.9 VITAMIN D DEFICIENCY: ICD-10-CM

## 2020-06-23 DIAGNOSIS — J44.9 CHRONIC OBSTRUCTIVE PULMONARY DISEASE, UNSPECIFIED COPD TYPE (CMD): ICD-10-CM

## 2020-06-23 DIAGNOSIS — I25.10 CORONARY ARTERY DISEASE INVOLVING NATIVE HEART WITHOUT ANGINA PECTORIS, UNSPECIFIED VESSEL OR LESION TYPE: ICD-10-CM

## 2020-06-23 DIAGNOSIS — F17.210 CIGARETTE NICOTINE DEPENDENCE WITHOUT COMPLICATION: ICD-10-CM

## 2020-06-23 DIAGNOSIS — I70.0 THORACIC AORTA ATHEROSCLEROSIS (CMD): ICD-10-CM

## 2020-06-23 DIAGNOSIS — E78.00 HYPERCHOLESTEREMIA: ICD-10-CM

## 2020-06-23 DIAGNOSIS — F41.9 ANXIETY: Primary | ICD-10-CM

## 2020-06-23 DIAGNOSIS — I65.23 OCCLUSION AND STENOSIS OF BILATERAL CAROTID ARTERIES: ICD-10-CM

## 2020-06-23 DIAGNOSIS — J43.2 CENTRILOBULAR EMPHYSEMA (CMD): ICD-10-CM

## 2020-06-23 PROCEDURE — 99214 OFFICE O/P EST MOD 30 MIN: CPT | Performed by: INTERNAL MEDICINE

## 2020-06-23 ASSESSMENT — ENCOUNTER SYMPTOMS
CHILLS: 0
COUGH: 0
SUSPICIOUS LESIONS: 0
FEVER: 0
WEIGHT LOSS: 0
WEIGHT GAIN: 0
HEMOPTYSIS: 0
HEMATOCHEZIA: 0
ALLERGIC/IMMUNOLOGIC COMMENTS: NO NEW FOOD ALLERGIES
BRUISES/BLEEDS EASILY: 0

## 2020-06-25 ENCOUNTER — E-ADVICE (OUTPATIENT)
Dept: CARDIOLOGY | Age: 70
End: 2020-06-25

## 2020-06-27 DIAGNOSIS — E03.9 HYPOTHYROIDISM, UNSPECIFIED TYPE: ICD-10-CM

## 2020-06-30 RX ORDER — LEVOTHYROXINE SODIUM 75 MCG
75 TABLET ORAL
Qty: 90 TABLET | Refills: 3 | Status: SHIPPED | OUTPATIENT
Start: 2020-06-30 | End: 2020-12-07

## 2020-07-21 ENCOUNTER — PATIENT MESSAGE (OUTPATIENT)
Dept: FAMILY MEDICINE CLINIC | Facility: CLINIC | Age: 70
End: 2020-07-21

## 2020-07-21 ENCOUNTER — TELEPHONE (OUTPATIENT)
Dept: CASE MANAGEMENT | Age: 70
End: 2020-07-21

## 2020-07-21 DIAGNOSIS — E03.9 HYPOTHYROIDISM, UNSPECIFIED TYPE: Primary | ICD-10-CM

## 2020-07-21 NOTE — TELEPHONE ENCOUNTER
From: Salazar Mustafa  To: Chen Carson DO  Sent: 7/21/2020 4:38 PM CDT  Subject: Other    Dear Dr. Malinda Peters,  Normally my cardiologist, Dr. Anamika Nuñez, orders a TSH test every 6 months. She did not order the test for my appointment with her in December.  Do

## 2020-07-31 ENCOUNTER — HOSPITAL ENCOUNTER (OUTPATIENT)
Dept: CARDIOLOGY CLINIC | Facility: HOSPITAL | Age: 70
Discharge: HOME | End: 2020-07-31
Attending: INTERNAL MEDICINE
Payer: MEDICARE

## 2020-07-31 DIAGNOSIS — I65.23 OCCLUSION AND STENOSIS OF BILATERAL CAROTID ARTERIES: ICD-10-CM

## 2020-07-31 PROCEDURE — 93880 EXTRACRANIAL BILAT STUDY: CPT | Performed by: INTERNAL MEDICINE

## 2020-08-03 DIAGNOSIS — I65.23 OCCLUSION AND STENOSIS OF BILATERAL CAROTID ARTERIES: ICD-10-CM

## 2020-08-04 ENCOUNTER — E-ADVICE (OUTPATIENT)
Dept: CARDIOLOGY | Age: 70
End: 2020-08-04

## 2020-11-23 ENCOUNTER — TELEPHONE (OUTPATIENT)
Dept: CARDIOLOGY | Age: 70
End: 2020-11-23

## 2020-12-02 ENCOUNTER — LAB ENCOUNTER (OUTPATIENT)
Dept: LAB | Age: 70
End: 2020-12-02
Attending: INTERNAL MEDICINE
Payer: MEDICARE

## 2020-12-02 ENCOUNTER — LABORATORY ENCOUNTER (OUTPATIENT)
Dept: LAB | Age: 70
End: 2020-12-02
Attending: FAMILY MEDICINE
Payer: MEDICARE

## 2020-12-02 DIAGNOSIS — E78.00 PURE HYPERCHOLESTEROLEMIA: Primary | ICD-10-CM

## 2020-12-02 DIAGNOSIS — E03.9 HYPOTHYROIDISM, UNSPECIFIED TYPE: ICD-10-CM

## 2020-12-02 PROCEDURE — 84443 ASSAY THYROID STIM HORMONE: CPT

## 2020-12-02 PROCEDURE — 36415 COLL VENOUS BLD VENIPUNCTURE: CPT

## 2020-12-02 PROCEDURE — 80061 LIPID PANEL: CPT

## 2020-12-02 PROCEDURE — 80053 COMPREHEN METABOLIC PANEL: CPT

## 2020-12-07 ENCOUNTER — TELEMEDICINE (OUTPATIENT)
Dept: FAMILY MEDICINE CLINIC | Facility: CLINIC | Age: 70
End: 2020-12-07

## 2020-12-07 VITALS
WEIGHT: 143 LBS | HEIGHT: 63.5 IN | HEART RATE: 81 BPM | BODY MASS INDEX: 25.02 KG/M2 | SYSTOLIC BLOOD PRESSURE: 116 MMHG | DIASTOLIC BLOOD PRESSURE: 84 MMHG

## 2020-12-07 DIAGNOSIS — E55.9 VITAMIN D DEFICIENCY: ICD-10-CM

## 2020-12-07 DIAGNOSIS — K57.10 DIVERTICULOSIS OF SMALL INTESTINE WITHOUT HEMORRHAGE: ICD-10-CM

## 2020-12-07 DIAGNOSIS — E04.9 GOITER: ICD-10-CM

## 2020-12-07 DIAGNOSIS — E03.8 OTHER SPECIFIED HYPOTHYROIDISM: ICD-10-CM

## 2020-12-07 DIAGNOSIS — Z72.0 TOBACCO ABUSE: ICD-10-CM

## 2020-12-07 DIAGNOSIS — G43.109 MIGRAINE WITH AURA AND WITHOUT STATUS MIGRAINOSUS, NOT INTRACTABLE: ICD-10-CM

## 2020-12-07 DIAGNOSIS — J44.9 CHRONIC OBSTRUCTIVE PULMONARY DISEASE, UNSPECIFIED COPD TYPE (HCC): ICD-10-CM

## 2020-12-07 DIAGNOSIS — F33.1 MODERATE RECURRENT MAJOR DEPRESSION (HCC): Chronic | ICD-10-CM

## 2020-12-07 DIAGNOSIS — E03.9 HYPOTHYROIDISM, UNSPECIFIED TYPE: ICD-10-CM

## 2020-12-07 DIAGNOSIS — I25.10 CORONARY ARTERY DISEASE INVOLVING NATIVE CORONARY ARTERY OF NATIVE HEART WITHOUT ANGINA PECTORIS: ICD-10-CM

## 2020-12-07 DIAGNOSIS — Z00.00 ENCOUNTER FOR ANNUAL HEALTH EXAMINATION: Primary | ICD-10-CM

## 2020-12-07 DIAGNOSIS — F41.9 ANXIETY: ICD-10-CM

## 2020-12-07 DIAGNOSIS — Z12.31 ENCOUNTER FOR SCREENING MAMMOGRAM FOR HIGH-RISK PATIENT: ICD-10-CM

## 2020-12-07 DIAGNOSIS — I70.0 THORACIC AORTA ATHEROSCLEROSIS (HCC): ICD-10-CM

## 2020-12-07 DIAGNOSIS — I20.8 STABLE ANGINA (HCC): ICD-10-CM

## 2020-12-07 DIAGNOSIS — J43.2 CENTRILOBULAR EMPHYSEMA (HCC): ICD-10-CM

## 2020-12-07 DIAGNOSIS — I25.10 CORONARY ARTERY DISEASE INVOLVING NATIVE HEART, ANGINA PRESENCE UNSPECIFIED, UNSPECIFIED VESSEL OR LESION TYPE: ICD-10-CM

## 2020-12-07 PROCEDURE — 96160 PT-FOCUSED HLTH RISK ASSMT: CPT | Performed by: FAMILY MEDICINE

## 2020-12-07 PROCEDURE — 3079F DIAST BP 80-89 MM HG: CPT | Performed by: FAMILY MEDICINE

## 2020-12-07 PROCEDURE — G0439 PPPS, SUBSEQ VISIT: HCPCS | Performed by: FAMILY MEDICINE

## 2020-12-07 PROCEDURE — 3008F BODY MASS INDEX DOCD: CPT | Performed by: FAMILY MEDICINE

## 2020-12-07 PROCEDURE — 3074F SYST BP LT 130 MM HG: CPT | Performed by: FAMILY MEDICINE

## 2020-12-07 RX ORDER — LEVOTHYROXINE SODIUM 75 MCG
75 TABLET ORAL
Qty: 90 TABLET | Refills: 3 | Status: SHIPPED | OUTPATIENT
Start: 2020-12-07 | End: 2021-06-30

## 2020-12-07 NOTE — PROGRESS NOTES
This visit is conducted using Telemedicine with live, interactive video and audio.   Telehealth outside of [address] Verbal Consent   I conducted a telehealth visit with Mariana Linder today, 12/07/20, which was completed using two-way, real-time Cognitive Assessment   She had a completely normal cognitive assessment- see flowsheet entries    Functional Ability/Status   Kevin Magaña has a completely normal functional assessment! Please see flow sheet section for details.       Depression Scree Hypothyroidism- stable cpm      Anxiety- see psych      Stable angina [facility])- see cards      Chronic obstructive pulmonary disease ([facility] [address]- stable monitor      Moderate recurrent major depression ([facility] [address]- see cards      Coronary artery disease involving native Ovarian mass, Pneumonia, Pneumonia, organism unspecified(486), Post PTCA (11/6/2014), Post traumatic stress disorder (PTSD), Tobacco abuse (12/23/2013), and Unspecified essential hypertension.     She  has a past surgical history that includes tonsillectomy Whispered voice         Visual Acuity                         EXAM   alert, appears stated age and cooperative, Normocephalic, without obvious abnormality, atraumatic, lips, mucosa, and tongue normal; teeth and gums normal, Speaking in full sentences com SCREENING BILAT (CPT=77067/46897); Future    Hypothyroidism, unspecified type- check labs   -     SYNTHROID 75 MCG Oral Tab;  Take 1 tablet (75 mcg total) by mouth before breakfast.         Diet assessment: good     PLAN:  The patient indicates understandin Diabetics, FHx Glaucoma, AA>50, > 65 No flowsheet data found. Bone Density Screening      Dexascan Every two years Last Dexa Scan:   XR DEXA BONE DENSITOMETRY (CPT=77080) 06/15/2020    No flowsheet data found.     Pap and Pelvic      Pap: Every 3 [[id number]]

## 2020-12-07 NOTE — PATIENT INSTRUCTIONS
[facility] SCREENING SCHEDULE   Tests on this list are recommended by your physician but may not be covered, or covered at this frequency, by your insurer. Please check with your insurance carrier before scheduling to verify coverage.    PREVENTATI 73-68 years old and have smoked more than 100 cigarettes in their lifetime   • Anyone with a family history    Colorectal Cancer Screening  Covered up to Age 76     Colonoscopy Screen   Covered every 10 years- more often if abnormal Colonoscopy due on 11/1 visit.  Please get every year    Pneumococcal 13 (Prevnar)  Covered Once after 65 Orders placed or performed in visit on 10/13/15   • PNEUMOCOCCAL VACC, 13 NILDA IM    Please get once after your 65th birthday    Pneumococcal 23 (Pneumovax)  Covered Once after Directives.

## 2020-12-09 RX ORDER — METOPROLOL SUCCINATE 25 MG/1
TABLET, EXTENDED RELEASE ORAL
Qty: 45 TABLET | Refills: 3 | Status: SHIPPED | OUTPATIENT
Start: 2020-12-09

## 2020-12-20 NOTE — LETTER
To:  DR NIETO   Date:  2025  Patient Name: Sophia Rendon-Age / Sex: 1950-A: 74 y  female  Medical Records: [id number]   CSN: [id number]      Clearance for Surgery Requested by Surgeon    Request for:  Medical Clearance    Requested by Surgeon: DR BRISCOE     Surgical Date: 2025    Procedure: LAPAROSCOPIC CHOLECYSTECTOMY, POSSIBLE OPEN, WITH INJECTION OF INDOCYANINE GREEN (ICG), N/A      Please fax the clearance note to the Pre-Admission Testing department.  Thank you.  
To:  DR PORRAS  Date:  2025  Patient Name: Sophia Rendon-Age / Sex: 1950-A: 74 y  female  Medical Records: [id number]   CSN: [id number]      Clearance for Surgery Requested by Surgeon    Request for:  Cardiac Clearance    Requested by Surgeon: DR BRISCOE     Surgical Date: 2025    Procedure: LAPAROSCOPIC CHOLECYSTECTOMY, POSSIBLE OPEN, WITH INJECTION OF INDOCYANINE GREEN (ICG), N/A      Please fax the clearance note to the Pre-Admission Testing department.  Thank you.  
pt c/o of LLQ abdominal pain associated with nausea. pt denies diarrhea. pt states the pain "feels like what she felt after her hysterectomy."

## 2020-12-22 ENCOUNTER — OFFICE VISIT (OUTPATIENT)
Dept: CARDIOLOGY | Age: 70
End: 2020-12-22

## 2020-12-22 VITALS
WEIGHT: 143 LBS | HEIGHT: 64 IN | DIASTOLIC BLOOD PRESSURE: 75 MMHG | SYSTOLIC BLOOD PRESSURE: 114 MMHG | BODY MASS INDEX: 24.41 KG/M2 | HEART RATE: 74 BPM

## 2020-12-22 DIAGNOSIS — E78.2 HYPERLIPIDEMIA, MIXED: ICD-10-CM

## 2020-12-22 DIAGNOSIS — J43.2 CENTRILOBULAR EMPHYSEMA (CMD): ICD-10-CM

## 2020-12-22 DIAGNOSIS — I70.0 THORACIC AORTA ATHEROSCLEROSIS (CMD): ICD-10-CM

## 2020-12-22 DIAGNOSIS — I65.23 OCCLUSION AND STENOSIS OF BILATERAL CAROTID ARTERIES: ICD-10-CM

## 2020-12-22 DIAGNOSIS — F41.9 ANXIETY: Primary | ICD-10-CM

## 2020-12-22 DIAGNOSIS — Z72.0 TOBACCO ABUSE: ICD-10-CM

## 2020-12-22 DIAGNOSIS — E55.9 VITAMIN D DEFICIENCY: ICD-10-CM

## 2020-12-22 DIAGNOSIS — I25.10 CORONARY ARTERY DISEASE INVOLVING NATIVE HEART WITHOUT ANGINA PECTORIS, UNSPECIFIED VESSEL OR LESION TYPE: ICD-10-CM

## 2020-12-22 DIAGNOSIS — J44.9 CHRONIC OBSTRUCTIVE PULMONARY DISEASE, UNSPECIFIED COPD TYPE (CMD): ICD-10-CM

## 2020-12-22 DIAGNOSIS — F17.210 CIGARETTE NICOTINE DEPENDENCE WITHOUT COMPLICATION: ICD-10-CM

## 2020-12-22 DIAGNOSIS — R09.89 CAROTID BRUIT, UNSPECIFIED LATERALITY: ICD-10-CM

## 2020-12-22 PROCEDURE — 99214 OFFICE O/P EST MOD 30 MIN: CPT | Performed by: INTERNAL MEDICINE

## 2020-12-22 SDOH — HEALTH STABILITY: PHYSICAL HEALTH: ON AVERAGE, HOW MANY MINUTES DO YOU ENGAGE IN EXERCISE AT THIS LEVEL?: 0 MIN

## 2020-12-22 SDOH — HEALTH STABILITY: PHYSICAL HEALTH: ON AVERAGE, HOW MANY DAYS PER WEEK DO YOU ENGAGE IN MODERATE TO STRENUOUS EXERCISE (LIKE A BRISK WALK)?: 0 DAYS

## 2020-12-22 ASSESSMENT — PATIENT HEALTH QUESTIONNAIRE - PHQ9
SUM OF ALL RESPONSES TO PHQ9 QUESTIONS 1 AND 2: 0
CLINICAL INTERPRETATION OF PHQ9 SCORE: NO FURTHER SCREENING NEEDED
SUM OF ALL RESPONSES TO PHQ9 QUESTIONS 1 AND 2: 0
1. LITTLE INTEREST OR PLEASURE IN DOING THINGS: NOT AT ALL
CLINICAL INTERPRETATION OF PHQ2 SCORE: NO FURTHER SCREENING NEEDED
2. FEELING DOWN, DEPRESSED OR HOPELESS: NOT AT ALL

## 2020-12-22 ASSESSMENT — ENCOUNTER SYMPTOMS
WEIGHT GAIN: 0
WEIGHT LOSS: 0
ALLERGIC/IMMUNOLOGIC COMMENTS: NO NEW FOOD ALLERGIES
BRUISES/BLEEDS EASILY: 0
CHILLS: 0
HEMATOCHEZIA: 0
HEMOPTYSIS: 0
FEVER: 0
SUSPICIOUS LESIONS: 0
COUGH: 0

## 2021-01-05 ENCOUNTER — TELEPHONE (OUTPATIENT)
Dept: CARDIOLOGY | Age: 71
End: 2021-01-05

## 2021-01-06 ENCOUNTER — TELEPHONE (OUTPATIENT)
Dept: CARDIOLOGY | Age: 71
End: 2021-01-06

## 2021-01-06 ENCOUNTER — E-ADVICE (OUTPATIENT)
Dept: CARDIOLOGY | Age: 71
End: 2021-01-06

## 2021-01-06 DIAGNOSIS — I70.0 THORACIC AORTA ATHEROSCLEROSIS (CMD): Primary | ICD-10-CM

## 2021-01-06 DIAGNOSIS — Z79.01 LONG TERM (CURRENT) USE OF ANTICOAGULANTS: ICD-10-CM

## 2021-01-06 RX ORDER — CLOPIDOGREL BISULFATE 75 MG/1
TABLET ORAL
Qty: 90 TABLET | Refills: 0 | Status: SHIPPED | OUTPATIENT
Start: 2021-01-06 | End: 2021-04-02

## 2021-01-27 DIAGNOSIS — Z23 NEED FOR VACCINATION: ICD-10-CM

## 2021-02-01 ENCOUNTER — IMMUNIZATION (OUTPATIENT)
Dept: LAB | Age: 71
End: 2021-02-01
Attending: HOSPITALIST
Payer: MEDICARE

## 2021-02-01 DIAGNOSIS — Z23 NEED FOR VACCINATION: Primary | ICD-10-CM

## 2021-02-01 PROCEDURE — 0001A SARSCOV2 VAC 30MCG/0.3ML IM: CPT

## 2021-02-05 ENCOUNTER — LAB ENCOUNTER (OUTPATIENT)
Dept: LAB | Age: 71
End: 2021-02-05
Attending: INTERNAL MEDICINE
Payer: MEDICARE

## 2021-02-05 DIAGNOSIS — Z79.01 LONG TERM (CURRENT) USE OF ANTICOAGULANTS: Primary | ICD-10-CM

## 2021-02-05 LAB
BASOPHILS # BLD AUTO: 0.05 X10(3) UL (ref 0–0.2)
BASOPHILS NFR BLD AUTO: 0.8 %
DEPRECATED RDW RBC AUTO: 47.1 FL (ref 35.1–46.3)
EOSINOPHIL # BLD AUTO: 0.07 X10(3) UL (ref 0–0.7)
EOSINOPHIL NFR BLD AUTO: 1.1 %
ERYTHROCYTE [DISTWIDTH] IN BLOOD BY AUTOMATED COUNT: 13.6 % (ref 11–15)
HCT VFR BLD AUTO: 46.8 %
HGB BLD-MCNC: 15.7 G/DL
IMM GRANULOCYTES # BLD AUTO: 0.02 X10(3) UL (ref 0–1)
IMM GRANULOCYTES NFR BLD: 0.3 %
LYMPHOCYTES # BLD AUTO: 1.84 X10(3) UL (ref 1–4)
LYMPHOCYTES NFR BLD AUTO: 28.3 %
MCH RBC QN AUTO: 31.6 PG (ref 26–34)
MCHC RBC AUTO-ENTMCNC: 33.5 G/DL (ref 31–37)
MCV RBC AUTO: 94.2 FL
MONOCYTES # BLD AUTO: 0.52 X10(3) UL (ref 0.1–1)
MONOCYTES NFR BLD AUTO: 8 %
NEUTROPHILS # BLD AUTO: 4.01 X10 (3) UL (ref 1.5–7.7)
NEUTROPHILS # BLD AUTO: 4.01 X10(3) UL (ref 1.5–7.7)
NEUTROPHILS NFR BLD AUTO: 61.5 %
PLATELET # BLD AUTO: 237 10(3)UL (ref 150–450)
RBC # BLD AUTO: 4.97 X10(6)UL
WBC # BLD AUTO: 6.5 X10(3) UL (ref 4–11)

## 2021-02-05 PROCEDURE — 85025 COMPLETE CBC W/AUTO DIFF WBC: CPT

## 2021-02-05 PROCEDURE — 36415 COLL VENOUS BLD VENIPUNCTURE: CPT

## 2021-02-10 ENCOUNTER — E-ADVICE (OUTPATIENT)
Dept: CARDIOLOGY | Age: 71
End: 2021-02-10

## 2021-02-22 ENCOUNTER — IMMUNIZATION (OUTPATIENT)
Dept: LAB | Age: 71
End: 2021-02-22
Attending: HOSPITALIST
Payer: MEDICARE

## 2021-02-22 DIAGNOSIS — Z23 NEED FOR VACCINATION: Primary | ICD-10-CM

## 2021-02-22 PROCEDURE — 0002A SARSCOV2 VAC 30MCG/0.3ML IM: CPT

## 2021-03-31 RX ORDER — SIMVASTATIN 10 MG
TABLET ORAL
Qty: 90 TABLET | Refills: 0 | Status: SHIPPED | OUTPATIENT
Start: 2021-03-31 | End: 2021-06-30

## 2021-04-02 RX ORDER — CLOPIDOGREL BISULFATE 75 MG/1
TABLET ORAL
Qty: 90 TABLET | Refills: 2 | Status: SHIPPED | OUTPATIENT
Start: 2021-04-02

## 2021-05-11 DIAGNOSIS — G43.109 MIGRAINE WITH AURA AND WITHOUT STATUS MIGRAINOSUS, NOT INTRACTABLE: ICD-10-CM

## 2021-05-11 RX ORDER — BUTALBITAL, ACETAMINOPHEN AND CAFFEINE 50; 325; 40 MG/1; MG/1; MG/1
1 TABLET ORAL EVERY 6 HOURS PRN
Qty: 30 TABLET | Refills: 0 | Status: SHIPPED | OUTPATIENT
Start: 2021-05-11

## 2021-05-25 VITALS
HEIGHT: 64 IN | BODY MASS INDEX: 23.22 KG/M2 | WEIGHT: 136 LBS | DIASTOLIC BLOOD PRESSURE: 62 MMHG | SYSTOLIC BLOOD PRESSURE: 104 MMHG | HEART RATE: 90 BPM

## 2021-05-28 ENCOUNTER — TELEPHONE (OUTPATIENT)
Dept: CARDIOLOGY | Age: 71
End: 2021-05-28

## 2021-05-28 ENCOUNTER — TELEPHONE (OUTPATIENT)
Dept: FAMILY MEDICINE CLINIC | Facility: CLINIC | Age: 71
End: 2021-05-28

## 2021-05-28 NOTE — TELEPHONE ENCOUNTER
Pt said her dentist will be calling to speak with Dr. Teresa Durham.   Pt giving her permission for Dr. Teresa Durham to speak with Dr. Mauri Kramer DDS

## 2021-06-16 ENCOUNTER — HOSPITAL ENCOUNTER (OUTPATIENT)
Dept: MAMMOGRAPHY | Age: 71
Discharge: HOME | End: 2021-06-16
Attending: FAMILY MEDICINE
Payer: MEDICARE

## 2021-06-16 DIAGNOSIS — Z12.31 ENCOUNTER FOR SCREENING MAMMOGRAM FOR HIGH-RISK PATIENT: ICD-10-CM

## 2021-06-16 PROCEDURE — 77063 BREAST TOMOSYNTHESIS BI: CPT | Performed by: FAMILY MEDICINE

## 2021-06-16 PROCEDURE — 77067 SCR MAMMO BI INCL CAD: CPT | Performed by: FAMILY MEDICINE

## 2021-06-18 ENCOUNTER — LAB ENCOUNTER (OUTPATIENT)
Dept: LAB | Age: 71
End: 2021-06-18
Attending: FAMILY MEDICINE
Payer: MEDICARE

## 2021-06-18 DIAGNOSIS — E04.9 GOITER: ICD-10-CM

## 2021-06-18 DIAGNOSIS — I70.0 THORACIC AORTA ATHEROSCLEROSIS (HCC): ICD-10-CM

## 2021-06-18 DIAGNOSIS — I65.23 OCCLUSION AND STENOSIS OF BILATERAL CAROTID ARTERIES: ICD-10-CM

## 2021-06-18 DIAGNOSIS — J44.9 CHRONIC OBSTRUCTIVE PULMONARY DISEASE (COPD) (HCC): Primary | ICD-10-CM

## 2021-06-18 DIAGNOSIS — E78.5 HYPERLIPIDEMIA: ICD-10-CM

## 2021-06-18 DIAGNOSIS — I25.10 CORONARY ARTERY DISEASE INVOLVING NATIVE HEART WITHOUT ANGINA PECTORIS: ICD-10-CM

## 2021-06-18 DIAGNOSIS — E55.9 VITAMIN D DEFICIENCY: ICD-10-CM

## 2021-06-18 PROCEDURE — 82306 VITAMIN D 25 HYDROXY: CPT

## 2021-06-18 PROCEDURE — 84443 ASSAY THYROID STIM HORMONE: CPT

## 2021-06-18 PROCEDURE — 80061 LIPID PANEL: CPT

## 2021-06-18 PROCEDURE — 84439 ASSAY OF FREE THYROXINE: CPT

## 2021-06-18 PROCEDURE — 36415 COLL VENOUS BLD VENIPUNCTURE: CPT

## 2021-06-18 PROCEDURE — 80053 COMPREHEN METABOLIC PANEL: CPT

## 2021-06-28 ENCOUNTER — APPOINTMENT (OUTPATIENT)
Dept: CARDIOLOGY | Age: 71
End: 2021-06-28

## 2021-06-30 DIAGNOSIS — E03.9 HYPOTHYROIDISM, UNSPECIFIED TYPE: ICD-10-CM

## 2021-06-30 RX ORDER — LEVOTHYROXINE SODIUM 75 MCG
75 TABLET ORAL
Qty: 90 TABLET | Refills: 3 | Status: SHIPPED | OUTPATIENT
Start: 2021-06-30

## 2021-06-30 RX ORDER — SIMVASTATIN 10 MG
TABLET ORAL
Qty: 90 TABLET | Refills: 0 | Status: SHIPPED | OUTPATIENT
Start: 2021-06-30

## 2021-06-30 NOTE — TELEPHONE ENCOUNTER
Rx Request  SYNTHROID 75 MCG Oral Tab    Disp:     90               R: 3    Associated Dx: Hypothyroidism    Last Refilled: 12/07/2020    Last Visit: 1207/2020    Protocol Passed?  Yes[ x ]       No[  ]

## 2021-07-01 ENCOUNTER — HOSPITAL ENCOUNTER (OUTPATIENT)
Dept: ULTRASOUND IMAGING | Age: 71
Discharge: HOME | End: 2021-07-01
Attending: FAMILY MEDICINE
Payer: MEDICARE

## 2021-07-01 DIAGNOSIS — E04.9 GOITER: ICD-10-CM

## 2021-07-01 PROCEDURE — 76536 US EXAM OF HEAD AND NECK: CPT | Performed by: FAMILY MEDICINE

## 2021-08-03 ENCOUNTER — OFFICE VISIT (OUTPATIENT)
Dept: FAMILY MEDICINE CLINIC | Facility: CLINIC | Age: 71
End: 2021-08-03
Payer: MEDICARE

## 2021-08-03 VITALS
HEIGHT: 63.5 IN | RESPIRATION RATE: 16 BRPM | OXYGEN SATURATION: 96 % | DIASTOLIC BLOOD PRESSURE: 76 MMHG | SYSTOLIC BLOOD PRESSURE: 112 MMHG | HEART RATE: 80 BPM | WEIGHT: 138 LBS | BODY MASS INDEX: 24.15 KG/M2

## 2021-08-03 DIAGNOSIS — F41.9 ANXIETY: ICD-10-CM

## 2021-08-03 DIAGNOSIS — Z00.00 ENCOUNTER FOR ANNUAL HEALTH EXAMINATION: Primary | ICD-10-CM

## 2021-08-03 DIAGNOSIS — Z12.11 COLON CANCER SCREENING: ICD-10-CM

## 2021-08-03 DIAGNOSIS — I70.0 THORACIC AORTA ATHEROSCLEROSIS (HCC): ICD-10-CM

## 2021-08-03 DIAGNOSIS — K57.10 DIVERTICULOSIS OF SMALL INTESTINE WITHOUT HEMORRHAGE: ICD-10-CM

## 2021-08-03 DIAGNOSIS — J44.9 CHRONIC OBSTRUCTIVE PULMONARY DISEASE, UNSPECIFIED COPD TYPE (HCC): ICD-10-CM

## 2021-08-03 DIAGNOSIS — G43.109 MIGRAINE WITH AURA AND WITHOUT STATUS MIGRAINOSUS, NOT INTRACTABLE: ICD-10-CM

## 2021-08-03 DIAGNOSIS — Z79.02 PLATELET INHIBITION DUE TO PLAVIX: ICD-10-CM

## 2021-08-03 DIAGNOSIS — I25.10 CORONARY ARTERY DISEASE INVOLVING NATIVE HEART, UNSPECIFIED VESSEL OR LESION TYPE, UNSPECIFIED WHETHER ANGINA PRESENT: ICD-10-CM

## 2021-08-03 DIAGNOSIS — E03.8 OTHER SPECIFIED HYPOTHYROIDISM: ICD-10-CM

## 2021-08-03 DIAGNOSIS — F43.10 PTSD (POST-TRAUMATIC STRESS DISORDER): ICD-10-CM

## 2021-08-03 DIAGNOSIS — E55.9 VITAMIN D DEFICIENCY: ICD-10-CM

## 2021-08-03 DIAGNOSIS — Z72.0 TOBACCO ABUSE: ICD-10-CM

## 2021-08-03 DIAGNOSIS — J43.2 CENTRILOBULAR EMPHYSEMA (HCC): ICD-10-CM

## 2021-08-03 DIAGNOSIS — E04.9 GOITER: ICD-10-CM

## 2021-08-03 DIAGNOSIS — I20.8 STABLE ANGINA (HCC): ICD-10-CM

## 2021-08-03 DIAGNOSIS — F33.1 MODERATE RECURRENT MAJOR DEPRESSION (HCC): ICD-10-CM

## 2021-08-03 PROCEDURE — G0439 PPPS, SUBSEQ VISIT: HCPCS | Performed by: FAMILY MEDICINE

## 2021-08-03 PROCEDURE — 3078F DIAST BP <80 MM HG: CPT | Performed by: FAMILY MEDICINE

## 2021-08-03 PROCEDURE — 99397 PER PM REEVAL EST PAT 65+ YR: CPT | Performed by: FAMILY MEDICINE

## 2021-08-03 PROCEDURE — 3074F SYST BP LT 130 MM HG: CPT | Performed by: FAMILY MEDICINE

## 2021-08-03 PROCEDURE — 3008F BODY MASS INDEX DOCD: CPT | Performed by: FAMILY MEDICINE

## 2021-08-03 PROCEDURE — 96160 PT-FOCUSED HLTH RISK ASSMT: CPT | Performed by: FAMILY MEDICINE

## 2021-08-03 RX ORDER — IPRATROPIUM/ALBUTEROL SULFATE 20-100 MCG
1 MIST INHALER (GRAM) INHALATION 4 TIMES DAILY
Qty: 12 G | Refills: 1 | Status: SHIPPED | OUTPATIENT
Start: 2021-08-03

## 2021-08-03 NOTE — PROGRESS NOTES
HPI:   Zack Gay is a 72year old female who presents for a MA (Medicare Advantage) Supervisit (Once per calendar year).       No topic due editable text        Fall/Risk Assessment   She has been screened for Falls and is low risk: Fall/Risk Scorin Platelet inhibition due to Plavix- see cards      Hypothyroidism- monitor      Anxiety- seeing psychiatry      Stable angina [facility])- seeing cards      Chronic obstructive pulmonary disease ([facility] [address]- stable on inhalers      Moderate recurrent major depression ( Inhalation Aerosol, Inhale 2 puffs into the lungs 2 (two) times daily. Fluticasone Propionate (FLONASE) 50 MCG/ACT Nasal Suspension, USE TWO SPRAYS IN EACH NOSTRIL ONE TIME DAILY.  (Patient taking differently: USE TWO SPRAYS IN EACH NOSTRIL ONE TIME DAILY her mother. SOCIAL HISTORY:   She  reports that she has been smoking cigarettes. She has been smoking about 1.00 pack per day. She has never used smokeless tobacco. She reports current alcohol use. She reports that she does not use drugs.      REVIEW OF S tenderness/mass/nodules; no carotid bruit or JVD   Back:   Symmetric, no curvature, ROM normal, no CVA tenderness   Lungs:   Clear to auscultation bilaterally, respirations unlabored   Heart:  Regular rate and rhythm, S1 and S2 normal, no murmur, rub, or g Soln; Inhale 1 puff into the lungs 4 (four) times daily.     Centrilobular emphysema (HCC)- stable cpm     Anxiety- referral for therapy   -     [facility]    PTSD (post-traumatic stress disorder)- referral for therapy   -     OP REFERRAL TO [facility] needed at [facility], and non-screening if indicated for medical reasons 07/08/2018      Ultrasound Screening for Abdominal Aortic Aneurysm (AAA) Covered once in a lifetime for one of the following risk factors   • Men who are 73-68 years old and Diptheria and Pertusis TD and TDaP Not covered by Medicare Part B -  No recommendations at this time    Zoster Not covered by Medicare Part B; may be covered with your pharmacy  prescription benefits -  Zoster Vaccines(1 of 2) Never done         Chronic Ob

## 2021-08-03 NOTE — PATIENT INSTRUCTIONS
[facility] SCREENING SCHEDULE   Tests on this list are recommended by your physician but may not be covered, or covered at this frequency, by your insurer. Please check with your insurance carrier before scheduling to verify coverage.    Cornelious Halo recommendations at this time   Pap and Pelvic    Pap   Covered every 2 years for women at normal risk;  Annually if at high risk -  No recommendations at this time    Chlamydia Annually if high risk -  No recommendations at this time   Screening Mammogram has information from the [address] [place] [address] regarding Advance Directives.

## 2021-09-25 ENCOUNTER — LAB ENCOUNTER (OUTPATIENT)
Dept: LAB | Facility: HOSPITAL | Age: 71
End: 2021-09-25
Attending: FAMILY MEDICINE
Payer: MEDICARE

## 2021-09-25 DIAGNOSIS — Z12.11 COLON CANCER SCREENING: ICD-10-CM

## 2021-09-25 PROCEDURE — 82274 ASSAY TEST FOR BLOOD FECAL: CPT

## 2022-01-17 ENCOUNTER — PATIENT MESSAGE (OUTPATIENT)
Dept: FAMILY MEDICINE CLINIC | Facility: CLINIC | Age: 72
End: 2022-01-17

## 2022-01-18 NOTE — TELEPHONE ENCOUNTER
Last TSH:  Component   Ref Range & Units 6/18/21 10:55 AM   TSH   0.358 - 3.740 mIU/mL 2.990      All labs look good   Written by Narinder Moran DO on 6/24/2021 10:12 PM CDT  Seen by patient Rimma Cash on 6/24/2021 10:45 PM    Last OV: 8/3/21  Return i

## 2022-01-18 NOTE — TELEPHONE ENCOUNTER
From: Ivelisse More  To: Leigha Moore DO  Sent: 1/17/2022 4:19 PM CST  Subject: Non-Urgent Medical Question    Hi Dr. Rula Spicer,    We normally visit or talk every 6 months.  I last saw you on August 3, 2021 and was wondering if you wanted to see me again

## 2022-03-01 ENCOUNTER — LAB ENCOUNTER (OUTPATIENT)
Dept: LAB | Age: 72
End: 2022-03-01
Attending: INTERNAL MEDICINE
Payer: MEDICARE

## 2022-03-01 ENCOUNTER — LAB ENCOUNTER (OUTPATIENT)
Dept: LAB | Age: 72
End: 2022-03-01
Attending: FAMILY MEDICINE
Payer: MEDICARE

## 2022-03-01 DIAGNOSIS — E78.2 HYPERLIPIDEMIA, MIXED: ICD-10-CM

## 2022-03-01 DIAGNOSIS — Z95.5 HISTORY OF CORONARY ARTERY STENT PLACEMENT: ICD-10-CM

## 2022-03-01 DIAGNOSIS — I25.10 CAD (CORONARY ARTERY DISEASE): ICD-10-CM

## 2022-03-01 DIAGNOSIS — I25.10 CAD (CORONARY ARTERY DISEASE): Primary | ICD-10-CM

## 2022-03-01 DIAGNOSIS — E03.9 HYPOTHYROIDISM, UNSPECIFIED TYPE: ICD-10-CM

## 2022-03-01 LAB
ALBUMIN SERPL-MCNC: 3.9 G/DL (ref 3.4–5)
ALBUMIN/GLOB SERPL: 1.2 {RATIO} (ref 1–2)
ALP LIVER SERPL-CCNC: 78 U/L
ALT SERPL-CCNC: 17 U/L
ANION GAP SERPL CALC-SCNC: 3 MMOL/L (ref 0–18)
AST SERPL-CCNC: 15 U/L (ref 15–37)
BILIRUB SERPL-MCNC: 0.5 MG/DL (ref 0.1–2)
BUN BLD-MCNC: 10 MG/DL (ref 7–18)
CALCIUM BLD-MCNC: 9.2 MG/DL (ref 8.5–10.1)
CHLORIDE SERPL-SCNC: 105 MMOL/L (ref 98–112)
CHOLEST SERPL-MCNC: 156 MG/DL (ref ?–200)
CREAT BLD-MCNC: 0.8 MG/DL
FASTING PATIENT LIPID ANSWER: YES
FASTING STATUS PATIENT QL REPORTED: YES
GLOBULIN PLAS-MCNC: 3.2 G/DL (ref 2.8–4.4)
GLUCOSE BLD-MCNC: 90 MG/DL (ref 70–99)
HDLC SERPL-MCNC: 56 MG/DL (ref 40–59)
LDLC SERPL CALC-MCNC: 77 MG/DL (ref ?–100)
NONHDLC SERPL-MCNC: 100 MG/DL (ref ?–130)
OSMOLALITY SERPL CALC.SUM OF ELEC: 289 MOSM/KG (ref 275–295)
POTASSIUM SERPL-SCNC: 4.2 MMOL/L (ref 3.5–5.1)
PROT SERPL-MCNC: 7.1 G/DL (ref 6.4–8.2)
SODIUM SERPL-SCNC: 140 MMOL/L (ref 136–145)
T4 FREE SERPL-MCNC: 1.2 NG/DL (ref 0.8–1.7)
TRIGL SERPL-MCNC: 130 MG/DL (ref 30–149)
TSI SER-ACNC: 3.47 MIU/ML (ref 0.36–3.74)
VLDLC SERPL CALC-MCNC: 20 MG/DL (ref 0–30)

## 2022-03-01 PROCEDURE — 80053 COMPREHEN METABOLIC PANEL: CPT

## 2022-03-01 PROCEDURE — 80061 LIPID PANEL: CPT

## 2022-03-01 PROCEDURE — 36415 COLL VENOUS BLD VENIPUNCTURE: CPT

## 2022-03-01 PROCEDURE — 84439 ASSAY OF FREE THYROXINE: CPT

## 2022-03-01 PROCEDURE — 84443 ASSAY THYROID STIM HORMONE: CPT

## 2022-04-28 ENCOUNTER — OFFICE VISIT (OUTPATIENT)
Dept: FAMILY MEDICINE CLINIC | Facility: CLINIC | Age: 72
End: 2022-04-28
Payer: MEDICARE

## 2022-04-28 VITALS
SYSTOLIC BLOOD PRESSURE: 124 MMHG | RESPIRATION RATE: 18 BRPM | WEIGHT: 136 LBS | BODY MASS INDEX: 23.8 KG/M2 | DIASTOLIC BLOOD PRESSURE: 76 MMHG | OXYGEN SATURATION: 99 % | TEMPERATURE: 98 F | HEIGHT: 63.5 IN | HEART RATE: 78 BPM

## 2022-04-28 DIAGNOSIS — Z13.820 SCREENING FOR OSTEOPOROSIS: ICD-10-CM

## 2022-04-28 DIAGNOSIS — Z12.31 ENCOUNTER FOR SCREENING MAMMOGRAM FOR HIGH-RISK PATIENT: ICD-10-CM

## 2022-04-28 DIAGNOSIS — Z00.00 ENCOUNTER FOR ANNUAL HEALTH EXAMINATION: Primary | ICD-10-CM

## 2022-04-28 DIAGNOSIS — Z79.02 PLATELET INHIBITION DUE TO PLAVIX: ICD-10-CM

## 2022-04-28 DIAGNOSIS — F33.1 MODERATE RECURRENT MAJOR DEPRESSION (HCC): ICD-10-CM

## 2022-04-28 DIAGNOSIS — F41.9 ANXIETY: ICD-10-CM

## 2022-04-28 DIAGNOSIS — E04.9 GOITER: ICD-10-CM

## 2022-04-28 DIAGNOSIS — I20.8 STABLE ANGINA (HCC): ICD-10-CM

## 2022-04-28 DIAGNOSIS — E53.8 VITAMIN B12 DEFICIENCY: ICD-10-CM

## 2022-04-28 DIAGNOSIS — Z78.0 ASYMPTOMATIC MENOPAUSE: ICD-10-CM

## 2022-04-28 DIAGNOSIS — E55.9 VITAMIN D DEFICIENCY: ICD-10-CM

## 2022-04-28 DIAGNOSIS — J43.2 CENTRILOBULAR EMPHYSEMA (HCC): ICD-10-CM

## 2022-04-28 DIAGNOSIS — F43.10 PTSD (POST-TRAUMATIC STRESS DISORDER): ICD-10-CM

## 2022-04-28 DIAGNOSIS — I70.0 THORACIC AORTA ATHEROSCLEROSIS (HCC): ICD-10-CM

## 2022-04-28 DIAGNOSIS — Z11.59 NEED FOR HEPATITIS C SCREENING TEST: ICD-10-CM

## 2022-04-28 DIAGNOSIS — E03.8 OTHER SPECIFIED HYPOTHYROIDISM: ICD-10-CM

## 2022-04-28 DIAGNOSIS — K57.10 DIVERTICULOSIS OF SMALL INTESTINE WITHOUT HEMORRHAGE: ICD-10-CM

## 2022-04-28 DIAGNOSIS — I25.10 CORONARY ARTERY DISEASE INVOLVING NATIVE HEART, UNSPECIFIED VESSEL OR LESION TYPE, UNSPECIFIED WHETHER ANGINA PRESENT: ICD-10-CM

## 2022-04-28 DIAGNOSIS — G43.109 MIGRAINE WITH AURA AND WITHOUT STATUS MIGRAINOSUS, NOT INTRACTABLE: ICD-10-CM

## 2022-04-28 DIAGNOSIS — E03.9 HYPOTHYROIDISM, UNSPECIFIED TYPE: ICD-10-CM

## 2022-04-28 DIAGNOSIS — D22.9 ATYPICAL NEVI: ICD-10-CM

## 2022-04-28 DIAGNOSIS — Z72.0 TOBACCO ABUSE: ICD-10-CM

## 2022-04-28 DIAGNOSIS — J44.9 CHRONIC OBSTRUCTIVE PULMONARY DISEASE, UNSPECIFIED COPD TYPE (HCC): ICD-10-CM

## 2022-04-28 DIAGNOSIS — R49.0 HOARSENESS: ICD-10-CM

## 2022-04-28 PROCEDURE — 3078F DIAST BP <80 MM HG: CPT | Performed by: FAMILY MEDICINE

## 2022-04-28 PROCEDURE — 96160 PT-FOCUSED HLTH RISK ASSMT: CPT | Performed by: FAMILY MEDICINE

## 2022-04-28 PROCEDURE — 3008F BODY MASS INDEX DOCD: CPT | Performed by: FAMILY MEDICINE

## 2022-04-28 PROCEDURE — 99397 PER PM REEVAL EST PAT 65+ YR: CPT | Performed by: FAMILY MEDICINE

## 2022-04-28 PROCEDURE — G0439 PPPS, SUBSEQ VISIT: HCPCS | Performed by: FAMILY MEDICINE

## 2022-04-28 PROCEDURE — 3074F SYST BP LT 130 MM HG: CPT | Performed by: FAMILY MEDICINE

## 2022-04-28 RX ORDER — LEVOTHYROXINE SODIUM 75 MCG
75 TABLET ORAL
Qty: 90 TABLET | Refills: 3 | Status: SHIPPED | OUTPATIENT
Start: 2022-04-28

## 2022-06-08 ENCOUNTER — TELEPHONE (OUTPATIENT)
Dept: FAMILY MEDICINE CLINIC | Facility: CLINIC | Age: 72
End: 2022-06-08

## 2022-06-08 NOTE — TELEPHONE ENCOUNTER
Hernandez Nair  Patient Customer Service Request Pool 2 days ago   Dear Dr. Dhaval Sow,  My last appointment with you was supposed to be my 6 month check up regarding my Thyroid & general health. When I arrived, I was asked if I would like to have my Medicare yearly exam done and I said fine. First off my weight was checked & it was 136.2. Unfortunately, it was entered as 162. I called that same day & was promised it would be corrected. (I had seen my cardiologist on 3-8 & it was 136.) So you can imagine my surprise today when I went to see Dr. Ramonita Marinelli & my weight was listed as 162. Not happy. Since this was supposed to be my Medicare appointment, I was surprised that no eye exam was done nor was there a breast exam done. We also never discussed my blood test results regarding my Thyroid. I was not going to address any of this with you until I saw that darn 162# on my chart @ Dr. Azra Paiz. I'm really upset about it because if an emergency happened & I went to the ER, they would use medications on me for that weight. I would really appreciate it if you would make sure that my EE health chart is corrected as quickly as possible. Thank you for your time & assistance in this matter,  Evette Moore    My chart sent.

## 2022-09-15 ENCOUNTER — LAB ENCOUNTER (OUTPATIENT)
Dept: LAB | Age: 72
End: 2022-09-15
Attending: FAMILY MEDICINE
Payer: MEDICARE

## 2022-09-15 DIAGNOSIS — J43.2 CENTRILOBULAR EMPHYSEMA (HCC): ICD-10-CM

## 2022-09-15 DIAGNOSIS — E55.9 VITAMIN D DEFICIENCY: ICD-10-CM

## 2022-09-15 DIAGNOSIS — E53.8 VITAMIN B12 DEFICIENCY: ICD-10-CM

## 2022-09-15 DIAGNOSIS — Z11.59 NEED FOR HEPATITIS C SCREENING TEST: ICD-10-CM

## 2022-09-15 DIAGNOSIS — I25.10 CORONARY ARTERY DISEASE INVOLVING NATIVE HEART, UNSPECIFIED VESSEL OR LESION TYPE, UNSPECIFIED WHETHER ANGINA PRESENT: ICD-10-CM

## 2022-09-15 DIAGNOSIS — E03.9 HYPOTHYROIDISM, UNSPECIFIED TYPE: ICD-10-CM

## 2022-09-15 DIAGNOSIS — E78.2 HYPERLIPIDEMIA, MIXED: ICD-10-CM

## 2022-09-15 DIAGNOSIS — E04.9 GOITER: ICD-10-CM

## 2022-09-15 DIAGNOSIS — I25.10 CAD (CORONARY ARTERY DISEASE): Primary | ICD-10-CM

## 2022-09-15 LAB
ALBUMIN SERPL-MCNC: 3.9 G/DL (ref 3.4–5)
ALBUMIN/GLOB SERPL: 1.1 {RATIO} (ref 1–2)
ALP LIVER SERPL-CCNC: 77 U/L
ALT SERPL-CCNC: 21 U/L
ANION GAP SERPL CALC-SCNC: 0 MMOL/L (ref 0–18)
AST SERPL-CCNC: 16 U/L (ref 15–37)
BASOPHILS # BLD AUTO: 0.05 X10(3) UL (ref 0–0.2)
BASOPHILS NFR BLD AUTO: 0.9 %
BILIRUB SERPL-MCNC: 0.9 MG/DL (ref 0.1–2)
BUN BLD-MCNC: 10 MG/DL (ref 7–18)
CALCIUM BLD-MCNC: 9.4 MG/DL (ref 8.5–10.1)
CHLORIDE SERPL-SCNC: 104 MMOL/L (ref 98–112)
CHOLEST SERPL-MCNC: 145 MG/DL (ref ?–200)
CO2 SERPL-SCNC: 32 MMOL/L (ref 21–32)
CREAT BLD-MCNC: 0.8 MG/DL
EOSINOPHIL # BLD AUTO: 0.06 X10(3) UL (ref 0–0.7)
EOSINOPHIL NFR BLD AUTO: 1.1 %
ERYTHROCYTE [DISTWIDTH] IN BLOOD BY AUTOMATED COUNT: 13.8 %
FASTING PATIENT LIPID ANSWER: YES
FASTING STATUS PATIENT QL REPORTED: YES
GFR SERPLBLD BASED ON 1.73 SQ M-ARVRAT: 79 ML/MIN/1.73M2 (ref 60–?)
GLOBULIN PLAS-MCNC: 3.6 G/DL (ref 2.8–4.4)
GLUCOSE BLD-MCNC: 84 MG/DL (ref 70–99)
HCT VFR BLD AUTO: 50.1 %
HCV AB SERPL QL IA: NONREACTIVE
HDLC SERPL-MCNC: 59 MG/DL (ref 40–59)
HGB BLD-MCNC: 16.5 G/DL
IMM GRANULOCYTES # BLD AUTO: 0.02 X10(3) UL (ref 0–1)
IMM GRANULOCYTES NFR BLD: 0.4 %
LDLC SERPL CALC-MCNC: 63 MG/DL (ref ?–100)
LYMPHOCYTES # BLD AUTO: 1.64 X10(3) UL (ref 1–4)
LYMPHOCYTES NFR BLD AUTO: 30.1 %
MCH RBC QN AUTO: 31.8 PG (ref 26–34)
MCHC RBC AUTO-ENTMCNC: 32.9 G/DL (ref 31–37)
MCV RBC AUTO: 96.5 FL
MONOCYTES # BLD AUTO: 0.37 X10(3) UL (ref 0.1–1)
MONOCYTES NFR BLD AUTO: 6.8 %
NEUTROPHILS # BLD AUTO: 3.3 X10 (3) UL (ref 1.5–7.7)
NEUTROPHILS # BLD AUTO: 3.3 X10(3) UL (ref 1.5–7.7)
NEUTROPHILS NFR BLD AUTO: 60.7 %
NONHDLC SERPL-MCNC: 86 MG/DL (ref ?–130)
OSMOLALITY SERPL CALC.SUM OF ELEC: 280 MOSM/KG (ref 275–295)
PLATELET # BLD AUTO: 229 10(3)UL (ref 150–450)
POTASSIUM SERPL-SCNC: 4 MMOL/L (ref 3.5–5.1)
PROT SERPL-MCNC: 7.5 G/DL (ref 6.4–8.2)
RBC # BLD AUTO: 5.19 X10(6)UL
SODIUM SERPL-SCNC: 136 MMOL/L (ref 136–145)
T4 FREE SERPL-MCNC: 1.2 NG/DL (ref 0.8–1.7)
TRIGL SERPL-MCNC: 131 MG/DL (ref 30–149)
TSI SER-ACNC: 4.1 MIU/ML (ref 0.36–3.74)
VIT B12 SERPL-MCNC: 1013 PG/ML (ref 193–986)
VIT D+METAB SERPL-MCNC: 27.3 NG/ML (ref 30–100)
VLDLC SERPL CALC-MCNC: 20 MG/DL (ref 0–30)
WBC # BLD AUTO: 5.4 X10(3) UL (ref 4–11)

## 2022-09-15 PROCEDURE — 84443 ASSAY THYROID STIM HORMONE: CPT

## 2022-09-15 PROCEDURE — 82306 VITAMIN D 25 HYDROXY: CPT

## 2022-09-15 PROCEDURE — 80061 LIPID PANEL: CPT

## 2022-09-15 PROCEDURE — 84439 ASSAY OF FREE THYROXINE: CPT

## 2022-09-15 PROCEDURE — 85025 COMPLETE CBC W/AUTO DIFF WBC: CPT

## 2022-09-15 PROCEDURE — 80053 COMPREHEN METABOLIC PANEL: CPT

## 2022-09-15 PROCEDURE — 86803 HEPATITIS C AB TEST: CPT

## 2022-09-15 PROCEDURE — 36415 COLL VENOUS BLD VENIPUNCTURE: CPT

## 2022-09-15 PROCEDURE — 82607 VITAMIN B-12: CPT

## 2022-09-20 ENCOUNTER — TELEPHONE (OUTPATIENT)
Dept: FAMILY MEDICINE CLINIC | Facility: CLINIC | Age: 72
End: 2022-09-20

## 2022-09-21 NOTE — TELEPHONE ENCOUNTER
Anen Trotter, ЕкатеринаD  [place] [address] Dr. Alyson Villalba,     It has been identified that your patient, Andre Ma, is not currently on an appropriate statin dose. She is being flagged as a patient with clinical ASCVD that is not currently on the appropriate dose of simvastatin. According to the AHA/ACC 2018 Cholesterol Guidelines and [facility] Quality Initiative, a high- or moderate-intensity statin therapy is recommended for patients with clinical ASCVD. Would recommend increasing simvastatin to 20mg daily or switching to atorvastatin 20-40 mg daily.      Thank you,   Marquise Fong, PharmD     Please clarify with pt - I recall an adverse reaction   Please place on allergy list if accurate

## 2022-09-21 NOTE — TELEPHONE ENCOUNTER
Dr. Kamilla Guy, I spoke with patient, she states:   Dr. Ruiz Bermudez prescribes: simvastatin 10mg nightly. Saw Dr. Artem Puente yesterday, taking pt off plavix, because she is starting aerds2 for macular degeneration. Med rec updated.

## 2022-09-27 ENCOUNTER — PATIENT MESSAGE (OUTPATIENT)
Dept: FAMILY MEDICINE CLINIC | Facility: CLINIC | Age: 72
End: 2022-09-27

## 2022-09-27 NOTE — TELEPHONE ENCOUNTER
From: John Hooker  To: Lonnie Meyers DO  Sent: 9/27/2022 3:17 PM CDT  Subject: Non-Urgent Medical Question    Dear Dr. Packer,  I received a text today saying you wanted me to schedule a colorectal cancer screening. I did the Cologuard test, as you requested, & it was negative. Why do I need further testing?   Thank you for your time,  Dave Hussein

## 2022-09-28 ENCOUNTER — HOSPITAL ENCOUNTER (OUTPATIENT)
Dept: MAMMOGRAPHY | Age: 72
Discharge: HOME | End: 2022-09-28
Attending: FAMILY MEDICINE
Payer: MEDICARE

## 2022-09-28 ENCOUNTER — HOSPITAL ENCOUNTER (OUTPATIENT)
Dept: BONE DENSITY | Age: 72
Discharge: HOME | End: 2022-09-28
Attending: FAMILY MEDICINE
Payer: MEDICARE

## 2022-09-28 DIAGNOSIS — Z78.0 ASYMPTOMATIC MENOPAUSE: ICD-10-CM

## 2022-09-28 DIAGNOSIS — Z12.31 ENCOUNTER FOR SCREENING MAMMOGRAM FOR HIGH-RISK PATIENT: ICD-10-CM

## 2022-09-28 DIAGNOSIS — Z13.820 SCREENING FOR OSTEOPOROSIS: ICD-10-CM

## 2022-09-28 PROCEDURE — 77080 DXA BONE DENSITY AXIAL: CPT | Performed by: FAMILY MEDICINE

## 2022-09-28 PROCEDURE — 77063 BREAST TOMOSYNTHESIS BI: CPT | Performed by: FAMILY MEDICINE

## 2022-09-28 PROCEDURE — 77067 SCR MAMMO BI INCL CAD: CPT | Performed by: FAMILY MEDICINE

## 2022-12-20 RX ORDER — LEVOTHYROXINE SODIUM 88 MCG
TABLET ORAL
Qty: 90 TABLET | Refills: 0 | Status: SHIPPED | OUTPATIENT
Start: 2022-12-20

## 2022-12-20 NOTE — TELEPHONE ENCOUNTER
My chart sent letting patient know they are overdue for 6 month follow up  LV:4/28/2022  LR:4/28/2022

## 2023-02-14 ENCOUNTER — LAB ENCOUNTER (OUTPATIENT)
Dept: LAB | Age: 73
End: 2023-02-14
Attending: INTERNAL MEDICINE
Payer: MEDICARE

## 2023-02-14 ENCOUNTER — LAB ENCOUNTER (OUTPATIENT)
Dept: LAB | Age: 73
End: 2023-02-14
Attending: FAMILY MEDICINE
Payer: MEDICARE

## 2023-02-14 DIAGNOSIS — D58.2 ELEVATED HEMOGLOBIN (HCC): ICD-10-CM

## 2023-02-14 DIAGNOSIS — R09.89 ABNORMAL CHEST SOUNDS: ICD-10-CM

## 2023-02-14 DIAGNOSIS — I10 HYPERTENSION: ICD-10-CM

## 2023-02-14 DIAGNOSIS — E55.9 VITAMIN D DEFICIENCY: ICD-10-CM

## 2023-02-14 DIAGNOSIS — I70.0 THORACIC AORTA ATHEROSCLEROSIS (HCC): Primary | ICD-10-CM

## 2023-02-14 DIAGNOSIS — E03.9 HYPOTHYROIDISM, UNSPECIFIED TYPE: ICD-10-CM

## 2023-02-14 DIAGNOSIS — I65.23: ICD-10-CM

## 2023-02-14 LAB
ALBUMIN SERPL-MCNC: 3.8 G/DL (ref 3.4–5)
ALBUMIN/GLOB SERPL: 1.1 {RATIO} (ref 1–2)
ALP LIVER SERPL-CCNC: 86 U/L
ALT SERPL-CCNC: 22 U/L
ANION GAP SERPL CALC-SCNC: 1 MMOL/L (ref 0–18)
AST SERPL-CCNC: 21 U/L (ref 15–37)
BASOPHILS # BLD AUTO: 0.07 X10(3) UL (ref 0–0.2)
BASOPHILS NFR BLD AUTO: 1.1 %
BILIRUB SERPL-MCNC: 0.8 MG/DL (ref 0.1–2)
BUN BLD-MCNC: 8 MG/DL (ref 7–18)
CALCIUM BLD-MCNC: 9.2 MG/DL (ref 8.5–10.1)
CHLORIDE SERPL-SCNC: 102 MMOL/L (ref 98–112)
CO2 SERPL-SCNC: 33 MMOL/L (ref 21–32)
CREAT BLD-MCNC: 0.65 MG/DL
EOSINOPHIL # BLD AUTO: 0.05 X10(3) UL (ref 0–0.7)
EOSINOPHIL NFR BLD AUTO: 0.8 %
ERYTHROCYTE [DISTWIDTH] IN BLOOD BY AUTOMATED COUNT: 13.9 %
FASTING STATUS PATIENT QL REPORTED: YES
GFR SERPLBLD BASED ON 1.73 SQ M-ARVRAT: 94 ML/MIN/1.73M2 (ref 60–?)
GLOBULIN PLAS-MCNC: 3.4 G/DL (ref 2.8–4.4)
GLUCOSE BLD-MCNC: 84 MG/DL (ref 70–99)
HCT VFR BLD AUTO: 48.3 %
HGB BLD-MCNC: 16.4 G/DL
IMM GRANULOCYTES # BLD AUTO: 0.04 X10(3) UL (ref 0–1)
IMM GRANULOCYTES NFR BLD: 0.6 %
LYMPHOCYTES # BLD AUTO: 1.44 X10(3) UL (ref 1–4)
LYMPHOCYTES NFR BLD AUTO: 23.2 %
MCH RBC QN AUTO: 32 PG (ref 26–34)
MCHC RBC AUTO-ENTMCNC: 34 G/DL (ref 31–37)
MCV RBC AUTO: 94.2 FL
MONOCYTES # BLD AUTO: 0.39 X10(3) UL (ref 0.1–1)
MONOCYTES NFR BLD AUTO: 6.3 %
NEUTROPHILS # BLD AUTO: 4.23 X10 (3) UL (ref 1.5–7.7)
NEUTROPHILS # BLD AUTO: 4.23 X10(3) UL (ref 1.5–7.7)
NEUTROPHILS NFR BLD AUTO: 68 %
OSMOLALITY SERPL CALC.SUM OF ELEC: 280 MOSM/KG (ref 275–295)
PLATELET # BLD AUTO: 214 10(3)UL (ref 150–450)
POTASSIUM SERPL-SCNC: 3.5 MMOL/L (ref 3.5–5.1)
PROT SERPL-MCNC: 7.2 G/DL (ref 6.4–8.2)
RBC # BLD AUTO: 5.13 X10(6)UL
SODIUM SERPL-SCNC: 136 MMOL/L (ref 136–145)
T4 FREE SERPL-MCNC: 1.4 NG/DL (ref 0.8–1.7)
TSI SER-ACNC: 1.58 MIU/ML (ref 0.36–3.74)
VIT D+METAB SERPL-MCNC: 28.5 NG/ML (ref 30–100)
WBC # BLD AUTO: 6.2 X10(3) UL (ref 4–11)

## 2023-02-14 PROCEDURE — 80053 COMPREHEN METABOLIC PANEL: CPT

## 2023-02-14 PROCEDURE — 85025 COMPLETE CBC W/AUTO DIFF WBC: CPT

## 2023-02-14 PROCEDURE — 36415 COLL VENOUS BLD VENIPUNCTURE: CPT

## 2023-02-14 PROCEDURE — 84439 ASSAY OF FREE THYROXINE: CPT

## 2023-02-14 PROCEDURE — 84443 ASSAY THYROID STIM HORMONE: CPT

## 2023-02-14 PROCEDURE — 82306 VITAMIN D 25 HYDROXY: CPT

## 2023-02-22 ENCOUNTER — OFFICE VISIT (OUTPATIENT)
Dept: FAMILY MEDICINE CLINIC | Facility: CLINIC | Age: 73
End: 2023-02-22
Payer: MEDICARE

## 2023-02-22 VITALS
HEIGHT: 63.5 IN | OXYGEN SATURATION: 96 % | DIASTOLIC BLOOD PRESSURE: 68 MMHG | WEIGHT: 138 LBS | SYSTOLIC BLOOD PRESSURE: 136 MMHG | RESPIRATION RATE: 16 BRPM | HEART RATE: 90 BPM | BODY MASS INDEX: 24.15 KG/M2

## 2023-02-22 DIAGNOSIS — D58.2 ELEVATED HEMOGLOBIN (HCC): ICD-10-CM

## 2023-02-22 DIAGNOSIS — J44.9 CHRONIC OBSTRUCTIVE PULMONARY DISEASE, UNSPECIFIED COPD TYPE (HCC): ICD-10-CM

## 2023-02-22 DIAGNOSIS — E03.8 OTHER SPECIFIED HYPOTHYROIDISM: ICD-10-CM

## 2023-02-22 DIAGNOSIS — Z72.0 TOBACCO ABUSE: Primary | ICD-10-CM

## 2023-02-22 DIAGNOSIS — E55.9 VITAMIN D DEFICIENCY: ICD-10-CM

## 2023-02-22 DIAGNOSIS — R79.81 LOW OXYGEN SATURATION: ICD-10-CM

## 2023-02-22 PROCEDURE — 3075F SYST BP GE 130 - 139MM HG: CPT | Performed by: FAMILY MEDICINE

## 2023-02-22 PROCEDURE — 3078F DIAST BP <80 MM HG: CPT | Performed by: FAMILY MEDICINE

## 2023-02-22 PROCEDURE — 3008F BODY MASS INDEX DOCD: CPT | Performed by: FAMILY MEDICINE

## 2023-02-22 PROCEDURE — 99214 OFFICE O/P EST MOD 30 MIN: CPT | Performed by: FAMILY MEDICINE

## 2023-02-22 RX ORDER — ERGOCALCIFEROL 1.25 MG/1
50000 CAPSULE ORAL WEEKLY
Qty: 12 CAPSULE | Refills: 0 | Status: SHIPPED | OUTPATIENT
Start: 2023-02-22

## 2023-02-22 RX ORDER — FLUTICASONE FUROATE, UMECLIDINIUM BROMIDE AND VILANTEROL TRIFENATATE 200; 62.5; 25 UG/1; UG/1; UG/1
1 POWDER RESPIRATORY (INHALATION) DAILY
Qty: 3 EACH | Refills: 0 | Status: SHIPPED | OUTPATIENT
Start: 2023-02-22

## 2023-02-28 ENCOUNTER — LAB ENCOUNTER (OUTPATIENT)
Dept: LAB | Age: 73
End: 2023-02-28
Attending: INTERNAL MEDICINE
Payer: MEDICARE

## 2023-02-28 DIAGNOSIS — I70.0 ATHEROSCLEROSIS OF AORTA (HCC): ICD-10-CM

## 2023-02-28 DIAGNOSIS — R09.89 ABNORMAL CHEST SOUNDS: ICD-10-CM

## 2023-02-28 DIAGNOSIS — E78.2 MIXED HYPERLIPIDEMIA: ICD-10-CM

## 2023-02-28 DIAGNOSIS — I10 ESSENTIAL HYPERTENSION, MALIGNANT: ICD-10-CM

## 2023-02-28 DIAGNOSIS — I25.10 CORONARY ATHEROSCLEROSIS OF NATIVE CORONARY ARTERY: Primary | ICD-10-CM

## 2023-02-28 DIAGNOSIS — I65.23 BILATERAL CAROTID ARTERY STENOSIS: ICD-10-CM

## 2023-02-28 LAB
CHOLEST SERPL-MCNC: 139 MG/DL (ref ?–200)
FASTING PATIENT LIPID ANSWER: YES
HDLC SERPL-MCNC: 57 MG/DL (ref 40–59)
LDLC SERPL CALC-MCNC: 59 MG/DL (ref ?–100)
NONHDLC SERPL-MCNC: 82 MG/DL (ref ?–130)
TRIGL SERPL-MCNC: 131 MG/DL (ref 30–149)
VLDLC SERPL CALC-MCNC: 19 MG/DL (ref 0–30)

## 2023-02-28 PROCEDURE — 80061 LIPID PANEL: CPT

## 2023-02-28 PROCEDURE — 36415 COLL VENOUS BLD VENIPUNCTURE: CPT

## 2023-03-22 RX ORDER — LEVOTHYROXINE SODIUM 88 MCG
TABLET ORAL
Qty: 90 TABLET | Refills: 0 | Status: SHIPPED | OUTPATIENT
Start: 2023-03-22

## 2023-06-14 RX ORDER — LEVOTHYROXINE SODIUM 88 MCG
TABLET ORAL
Qty: 90 TABLET | Refills: 0 | Status: SHIPPED | OUTPATIENT
Start: 2023-06-14

## 2023-08-02 ENCOUNTER — PATIENT MESSAGE (OUTPATIENT)
Dept: FAMILY MEDICINE CLINIC | Facility: CLINIC | Age: 73
End: 2023-08-02

## 2023-08-02 DIAGNOSIS — Z00.00 ENCOUNTER FOR ANNUAL HEALTH EXAMINATION: ICD-10-CM

## 2023-08-02 DIAGNOSIS — E55.9 VITAMIN D DEFICIENCY: Primary | ICD-10-CM

## 2023-08-04 NOTE — TELEPHONE ENCOUNTER
From: John Hooker  To: Lonnie Meyers DO  Sent: 8/2/2023 3:20 PM CDT  Subject: Thyroid test needed for 8-22 appointment? Hi Dr. Packer,  I have an appointment with you on 8-22. You normally order a Thyroid test to be done before I come in to see you. If you want one done, can you please issue an order for it? Do you also want a Vitamin D test?  Dr. Ludin Aguila has ordered a CBC, Lipid Panel, TSH & a CMP. Please advise.    Thank you,  Dave Hussein   [phone]

## 2023-08-18 ENCOUNTER — LAB ENCOUNTER (OUTPATIENT)
Dept: LAB | Age: 73
End: 2023-08-18
Attending: FAMILY MEDICINE
Payer: MEDICARE

## 2023-08-22 ENCOUNTER — OFFICE VISIT (OUTPATIENT)
Dept: FAMILY MEDICINE CLINIC | Facility: CLINIC | Age: 73
End: 2023-08-22
Payer: MEDICARE

## 2023-08-22 ENCOUNTER — PATIENT MESSAGE (OUTPATIENT)
Dept: FAMILY MEDICINE CLINIC | Facility: CLINIC | Age: 73
End: 2023-08-22

## 2023-08-22 VITALS
RESPIRATION RATE: 16 BRPM | HEART RATE: 78 BPM | DIASTOLIC BLOOD PRESSURE: 78 MMHG | OXYGEN SATURATION: 98 % | SYSTOLIC BLOOD PRESSURE: 136 MMHG | WEIGHT: 139 LBS | HEIGHT: 63.5 IN | BODY MASS INDEX: 24.32 KG/M2

## 2023-08-22 DIAGNOSIS — Z12.31 ENCOUNTER FOR SCREENING MAMMOGRAM FOR HIGH-RISK PATIENT: ICD-10-CM

## 2023-08-22 DIAGNOSIS — E55.9 VITAMIN D DEFICIENCY: ICD-10-CM

## 2023-08-22 DIAGNOSIS — E03.8 OTHER SPECIFIED HYPOTHYROIDISM: ICD-10-CM

## 2023-08-22 DIAGNOSIS — I70.0 THORACIC AORTA ATHEROSCLEROSIS (HCC): ICD-10-CM

## 2023-08-22 DIAGNOSIS — K57.10 DIVERTICULOSIS OF SMALL INTESTINE WITHOUT HEMORRHAGE: ICD-10-CM

## 2023-08-22 DIAGNOSIS — Z72.0 TOBACCO ABUSE: ICD-10-CM

## 2023-08-22 DIAGNOSIS — F41.9 ANXIETY: ICD-10-CM

## 2023-08-22 DIAGNOSIS — G43.109 MIGRAINE WITH AURA AND WITHOUT STATUS MIGRAINOSUS, NOT INTRACTABLE: ICD-10-CM

## 2023-08-22 DIAGNOSIS — R10.13 EPIGASTRIC PAIN: ICD-10-CM

## 2023-08-22 DIAGNOSIS — I25.10 CORONARY ARTERY DISEASE INVOLVING NATIVE HEART, UNSPECIFIED VESSEL OR LESION TYPE, UNSPECIFIED WHETHER ANGINA PRESENT: ICD-10-CM

## 2023-08-22 DIAGNOSIS — Z79.02 PLATELET INHIBITION DUE TO PLAVIX: ICD-10-CM

## 2023-08-22 DIAGNOSIS — Z00.00 ENCOUNTER FOR ANNUAL HEALTH EXAMINATION: Primary | ICD-10-CM

## 2023-08-22 DIAGNOSIS — J43.2 CENTRILOBULAR EMPHYSEMA (HCC): ICD-10-CM

## 2023-08-22 DIAGNOSIS — J44.9 CHRONIC OBSTRUCTIVE PULMONARY DISEASE, UNSPECIFIED COPD TYPE (HCC): ICD-10-CM

## 2023-08-22 DIAGNOSIS — I20.8 STABLE ANGINA (HCC): ICD-10-CM

## 2023-08-22 DIAGNOSIS — F33.1 MODERATE RECURRENT MAJOR DEPRESSION (HCC): ICD-10-CM

## 2023-08-22 DIAGNOSIS — Z80.3 FAMILY HISTORY OF BREAST CANCER: ICD-10-CM

## 2023-08-22 RX ORDER — LEVOTHYROXINE SODIUM 88 UG/1
88 TABLET ORAL
Qty: 90 TABLET | Refills: 3 | Status: SHIPPED | OUTPATIENT
Start: 2023-08-22

## 2023-08-23 NOTE — TELEPHONE ENCOUNTER
From: Colby Jay  To: Pradeep Drew DO  Sent: 8/22/2023 6:21 PM CDT  Subject: CBD to stop smoking     Hi Dr. Arnulfo Woods,  Sorry to bother you but I forgot to ask you- is it okay for me to try CBD to help me stop smoking?   Thank you for your time & patience,  Jose Singh   45-21-30

## 2023-10-24 ENCOUNTER — NURSE ONLY (OUTPATIENT)
Dept: FAMILY MEDICINE CLINIC | Facility: CLINIC | Age: 73
End: 2023-10-24

## 2023-10-24 PROCEDURE — 90674 CCIIV4 VAC NO PRSV 0.5 ML IM: CPT | Performed by: FAMILY MEDICINE

## 2023-10-24 PROCEDURE — G0008 ADMIN INFLUENZA VIRUS VAC: HCPCS | Performed by: FAMILY MEDICINE

## 2023-10-24 PROCEDURE — 90471 IMMUNIZATION ADMIN: CPT | Performed by: FAMILY MEDICINE

## 2023-10-25 ENCOUNTER — OFFICE VISIT (OUTPATIENT)
Dept: FAMILY MEDICINE CLINIC | Facility: CLINIC | Age: 73
End: 2023-10-25

## 2023-10-25 VITALS
WEIGHT: 139 LBS | DIASTOLIC BLOOD PRESSURE: 76 MMHG | BODY MASS INDEX: 24.32 KG/M2 | HEIGHT: 63.5 IN | SYSTOLIC BLOOD PRESSURE: 126 MMHG | RESPIRATION RATE: 16 BRPM | OXYGEN SATURATION: 95 % | HEART RATE: 67 BPM

## 2023-10-25 DIAGNOSIS — M65.311 TRIGGER FINGER OF RIGHT THUMB: Primary | ICD-10-CM

## 2023-10-25 PROCEDURE — 1160F RVW MEDS BY RX/DR IN RCRD: CPT | Performed by: FAMILY MEDICINE

## 2023-10-25 PROCEDURE — 1159F MED LIST DOCD IN RCRD: CPT | Performed by: FAMILY MEDICINE

## 2023-10-25 PROCEDURE — 3008F BODY MASS INDEX DOCD: CPT | Performed by: FAMILY MEDICINE

## 2023-10-25 PROCEDURE — 3074F SYST BP LT 130 MM HG: CPT | Performed by: FAMILY MEDICINE

## 2023-10-25 PROCEDURE — 99213 OFFICE O/P EST LOW 20 MIN: CPT | Performed by: FAMILY MEDICINE

## 2023-10-25 PROCEDURE — 3078F DIAST BP <80 MM HG: CPT | Performed by: FAMILY MEDICINE

## 2023-11-06 ENCOUNTER — OFFICE VISIT (OUTPATIENT)
Dept: ORTHOPEDICS CLINIC | Facility: CLINIC | Age: 73
End: 2023-11-06
Payer: MEDICARE

## 2023-11-06 VITALS — HEIGHT: 63.5 IN | BODY MASS INDEX: 24.32 KG/M2 | WEIGHT: 139 LBS

## 2023-11-06 DIAGNOSIS — M65.311 TRIGGER FINGER OF RIGHT THUMB: Primary | ICD-10-CM

## 2023-11-06 RX ORDER — BETAMETHASONE SODIUM PHOSPHATE AND BETAMETHASONE ACETATE 3; 3 MG/ML; MG/ML
6 INJECTION, SUSPENSION INTRA-ARTICULAR; INTRALESIONAL; INTRAMUSCULAR; SOFT TISSUE ONCE
Status: COMPLETED | OUTPATIENT
Start: 2023-11-06 | End: 2023-11-06

## 2023-11-06 RX ADMIN — BETAMETHASONE SODIUM PHOSPHATE AND BETAMETHASONE ACETATE 6 MG: 3; 3 INJECTION, SUSPENSION INTRA-ARTICULAR; INTRALESIONAL; INTRAMUSCULAR; SOFT TISSUE at 14:02:00

## 2024-02-23 ENCOUNTER — LAB ENCOUNTER (OUTPATIENT)
Dept: LAB | Age: 74
End: 2024-02-23
Attending: FAMILY MEDICINE
Payer: MEDICARE

## 2024-02-23 DIAGNOSIS — E55.9 VITAMIN D DEFICIENCY: ICD-10-CM

## 2024-02-23 DIAGNOSIS — E04.9 GOITER: ICD-10-CM

## 2024-02-23 LAB
T4 FREE SERPL-MCNC: 1.2 NG/DL (ref 0.8–1.7)
VIT D+METAB SERPL-MCNC: 33.7 NG/ML (ref 30–100)

## 2024-02-23 PROCEDURE — 36415 COLL VENOUS BLD VENIPUNCTURE: CPT

## 2024-02-23 PROCEDURE — 82306 VITAMIN D 25 HYDROXY: CPT

## 2024-02-23 PROCEDURE — 84439 ASSAY OF FREE THYROXINE: CPT

## 2024-02-29 ENCOUNTER — LAB ENCOUNTER (OUTPATIENT)
Dept: LAB | Age: 74
End: 2024-02-29
Attending: INTERNAL MEDICINE
Payer: MEDICARE

## 2024-02-29 DIAGNOSIS — E78.2 MIXED HYPERLIPIDEMIA: ICD-10-CM

## 2024-02-29 DIAGNOSIS — I10 ESSENTIAL HYPERTENSION, MALIGNANT: ICD-10-CM

## 2024-02-29 DIAGNOSIS — I20.89 STABLE ANGINA: ICD-10-CM

## 2024-02-29 DIAGNOSIS — I25.10 CORONARY ATHEROSCLEROSIS OF NATIVE CORONARY ARTERY: Primary | ICD-10-CM

## 2024-02-29 LAB
ALBUMIN SERPL-MCNC: 3.6 G/DL (ref 3.4–5)
ALBUMIN/GLOB SERPL: 1.1 {RATIO} (ref 1–2)
ALP LIVER SERPL-CCNC: 82 U/L
ALT SERPL-CCNC: 17 U/L
ANION GAP SERPL CALC-SCNC: 3 MMOL/L (ref 0–18)
AST SERPL-CCNC: 16 U/L (ref 15–37)
BASOPHILS # BLD AUTO: 0.05 X10(3) UL (ref 0–0.2)
BASOPHILS NFR BLD AUTO: 0.7 %
BILIRUB SERPL-MCNC: 0.8 MG/DL (ref 0.1–2)
BUN BLD-MCNC: 11 MG/DL (ref 9–23)
CALCIUM BLD-MCNC: 9.4 MG/DL (ref 8.5–10.1)
CHLORIDE SERPL-SCNC: 103 MMOL/L (ref 98–112)
CHOLEST SERPL-MCNC: 149 MG/DL (ref ?–200)
CO2 SERPL-SCNC: 30 MMOL/L (ref 21–32)
CREAT BLD-MCNC: 0.69 MG/DL
EGFRCR SERPLBLD CKD-EPI 2021: 92 ML/MIN/1.73M2 (ref 60–?)
EOSINOPHIL # BLD AUTO: 0.07 X10(3) UL (ref 0–0.7)
EOSINOPHIL NFR BLD AUTO: 1 %
ERYTHROCYTE [DISTWIDTH] IN BLOOD BY AUTOMATED COUNT: 13.4 %
FASTING PATIENT LIPID ANSWER: YES
FASTING STATUS PATIENT QL REPORTED: YES
GLOBULIN PLAS-MCNC: 3.4 G/DL (ref 2.8–4.4)
GLUCOSE BLD-MCNC: 95 MG/DL (ref 70–99)
HCT VFR BLD AUTO: 45.8 %
HDLC SERPL-MCNC: 51 MG/DL (ref 40–59)
HGB BLD-MCNC: 15.5 G/DL
IMM GRANULOCYTES # BLD AUTO: 0.02 X10(3) UL (ref 0–1)
IMM GRANULOCYTES NFR BLD: 0.3 %
LDLC SERPL CALC-MCNC: 71 MG/DL (ref ?–100)
LYMPHOCYTES # BLD AUTO: 1.64 X10(3) UL (ref 1–4)
LYMPHOCYTES NFR BLD AUTO: 23.9 %
MCH RBC QN AUTO: 31.4 PG (ref 26–34)
MCHC RBC AUTO-ENTMCNC: 33.8 G/DL (ref 31–37)
MCV RBC AUTO: 92.9 FL
MONOCYTES # BLD AUTO: 0.51 X10(3) UL (ref 0.1–1)
MONOCYTES NFR BLD AUTO: 7.4 %
NEUTROPHILS # BLD AUTO: 4.56 X10 (3) UL (ref 1.5–7.7)
NEUTROPHILS # BLD AUTO: 4.56 X10(3) UL (ref 1.5–7.7)
NEUTROPHILS NFR BLD AUTO: 66.7 %
NONHDLC SERPL-MCNC: 98 MG/DL (ref ?–130)
OSMOLALITY SERPL CALC.SUM OF ELEC: 281 MOSM/KG (ref 275–295)
PLATELET # BLD AUTO: 219 10(3)UL (ref 150–450)
POTASSIUM SERPL-SCNC: 3.7 MMOL/L (ref 3.5–5.1)
PROT SERPL-MCNC: 7 G/DL (ref 6.4–8.2)
RBC # BLD AUTO: 4.93 X10(6)UL
SODIUM SERPL-SCNC: 136 MMOL/L (ref 136–145)
T4 FREE SERPL-MCNC: 1.2 NG/DL (ref 0.8–1.7)
TRIGL SERPL-MCNC: 156 MG/DL (ref 30–149)
TSI SER-ACNC: 1.86 MIU/ML (ref 0.36–3.74)
VLDLC SERPL CALC-MCNC: 24 MG/DL (ref 0–30)
WBC # BLD AUTO: 6.9 X10(3) UL (ref 4–11)

## 2024-02-29 PROCEDURE — 80061 LIPID PANEL: CPT

## 2024-02-29 PROCEDURE — 36415 COLL VENOUS BLD VENIPUNCTURE: CPT

## 2024-02-29 PROCEDURE — 80053 COMPREHEN METABOLIC PANEL: CPT

## 2024-02-29 PROCEDURE — 85025 COMPLETE CBC W/AUTO DIFF WBC: CPT

## 2024-02-29 PROCEDURE — 84443 ASSAY THYROID STIM HORMONE: CPT

## 2024-02-29 PROCEDURE — 84439 ASSAY OF FREE THYROXINE: CPT

## 2024-04-24 ENCOUNTER — OFFICE VISIT (OUTPATIENT)
Dept: FAMILY MEDICINE CLINIC | Facility: CLINIC | Age: 74
End: 2024-04-24
Payer: MEDICARE

## 2024-04-24 ENCOUNTER — LAB ENCOUNTER (OUTPATIENT)
Dept: LAB | Age: 74
End: 2024-04-24
Attending: FAMILY MEDICINE
Payer: MEDICARE

## 2024-04-24 VITALS
DIASTOLIC BLOOD PRESSURE: 78 MMHG | RESPIRATION RATE: 16 BRPM | HEIGHT: 63.5 IN | OXYGEN SATURATION: 96 % | WEIGHT: 139 LBS | HEART RATE: 77 BPM | BODY MASS INDEX: 24.32 KG/M2 | SYSTOLIC BLOOD PRESSURE: 128 MMHG

## 2024-04-24 DIAGNOSIS — J44.9 CHRONIC OBSTRUCTIVE PULMONARY DISEASE, UNSPECIFIED COPD TYPE (HCC): ICD-10-CM

## 2024-04-24 DIAGNOSIS — R10.13 EPIGASTRIC PAIN: Primary | ICD-10-CM

## 2024-04-24 DIAGNOSIS — R10.11 RUQ PAIN: ICD-10-CM

## 2024-04-24 DIAGNOSIS — R10.13 EPIGASTRIC PAIN: ICD-10-CM

## 2024-04-24 LAB
ALBUMIN SERPL-MCNC: 3.8 G/DL (ref 3.4–5)
ALBUMIN/GLOB SERPL: 1.1 {RATIO} (ref 1–2)
ALP LIVER SERPL-CCNC: 86 U/L
ALT SERPL-CCNC: 19 U/L
ANION GAP SERPL CALC-SCNC: 3 MMOL/L (ref 0–18)
AST SERPL-CCNC: 23 U/L (ref 15–37)
BASOPHILS # BLD AUTO: 0.08 X10(3) UL (ref 0–0.2)
BASOPHILS NFR BLD AUTO: 1.2 %
BILIRUB SERPL-MCNC: 0.8 MG/DL (ref 0.1–2)
BILIRUBIN: NEGATIVE
BUN BLD-MCNC: 11 MG/DL (ref 9–23)
CALCIUM BLD-MCNC: 9.7 MG/DL (ref 8.5–10.1)
CHLORIDE SERPL-SCNC: 100 MMOL/L (ref 98–112)
CO2 SERPL-SCNC: 31 MMOL/L (ref 21–32)
CREAT BLD-MCNC: 0.77 MG/DL
EGFRCR SERPLBLD CKD-EPI 2021: 82 ML/MIN/1.73M2 (ref 60–?)
EOSINOPHIL # BLD AUTO: 0.13 X10(3) UL (ref 0–0.7)
EOSINOPHIL NFR BLD AUTO: 2 %
ERYTHROCYTE [DISTWIDTH] IN BLOOD BY AUTOMATED COUNT: 13.7 %
FASTING STATUS PATIENT QL REPORTED: YES
GLOBULIN PLAS-MCNC: 3.5 G/DL (ref 2.8–4.4)
GLUCOSE (URINE DIPSTICK): NEGATIVE MG/DL
GLUCOSE BLD-MCNC: 100 MG/DL (ref 70–99)
HCT VFR BLD AUTO: 47.9 %
HGB BLD-MCNC: 16 G/DL
IMM GRANULOCYTES # BLD AUTO: 0.02 X10(3) UL (ref 0–1)
IMM GRANULOCYTES NFR BLD: 0.3 %
KETONES (URINE DIPSTICK): NEGATIVE MG/DL
LIPASE SERPL-CCNC: 52 U/L (ref ?–300)
LYMPHOCYTES # BLD AUTO: 1.48 X10(3) UL (ref 1–4)
LYMPHOCYTES NFR BLD AUTO: 22.4 %
MCH RBC QN AUTO: 31.3 PG (ref 26–34)
MCHC RBC AUTO-ENTMCNC: 33.4 G/DL (ref 31–37)
MCV RBC AUTO: 93.7 FL
MONOCYTES # BLD AUTO: 0.55 X10(3) UL (ref 0.1–1)
MONOCYTES NFR BLD AUTO: 8.3 %
MULTISTIX LOT#: 1310 NUMERIC
NEUTROPHILS # BLD AUTO: 4.35 X10 (3) UL (ref 1.5–7.7)
NEUTROPHILS # BLD AUTO: 4.35 X10(3) UL (ref 1.5–7.7)
NEUTROPHILS NFR BLD AUTO: 65.8 %
NITRITE, URINE: NEGATIVE
OCCULT BLOOD: NEGATIVE
OSMOLALITY SERPL CALC.SUM OF ELEC: 277 MOSM/KG (ref 275–295)
PH, URINE: 7 (ref 4.5–8)
PLATELET # BLD AUTO: 226 10(3)UL (ref 150–450)
POTASSIUM SERPL-SCNC: 3.5 MMOL/L (ref 3.5–5.1)
PROT SERPL-MCNC: 7.3 G/DL (ref 6.4–8.2)
PROTEIN (URINE DIPSTICK): 30 MG/DL
RBC # BLD AUTO: 5.11 X10(6)UL
SODIUM SERPL-SCNC: 134 MMOL/L (ref 136–145)
SPECIFIC GRAVITY: 1.01 (ref 1–1.03)
URINE-COLOR: YELLOW
UROBILINOGEN,SEMI-QN: 0.2 MG/DL (ref 0–1.9)
WBC # BLD AUTO: 6.6 X10(3) UL (ref 4–11)

## 2024-04-24 PROCEDURE — 87086 URINE CULTURE/COLONY COUNT: CPT | Performed by: FAMILY MEDICINE

## 2024-04-24 PROCEDURE — 83690 ASSAY OF LIPASE: CPT

## 2024-04-24 PROCEDURE — 85025 COMPLETE CBC W/AUTO DIFF WBC: CPT

## 2024-04-24 PROCEDURE — 81003 URINALYSIS AUTO W/O SCOPE: CPT | Performed by: FAMILY MEDICINE

## 2024-04-24 PROCEDURE — 36415 COLL VENOUS BLD VENIPUNCTURE: CPT

## 2024-04-24 PROCEDURE — 99214 OFFICE O/P EST MOD 30 MIN: CPT | Performed by: FAMILY MEDICINE

## 2024-04-24 PROCEDURE — 80053 COMPREHEN METABOLIC PANEL: CPT

## 2024-04-24 RX ORDER — FLUTICASONE FUROATE, UMECLIDINIUM BROMIDE AND VILANTEROL TRIFENATATE 200; 62.5; 25 UG/1; UG/1; UG/1
1 POWDER RESPIRATORY (INHALATION) DAILY
Qty: 3 EACH | Refills: 0 | Status: SHIPPED | OUTPATIENT
Start: 2024-04-24

## 2024-04-24 RX ORDER — BUDESONIDE AND FORMOTEROL FUMARATE DIHYDRATE 160; 4.5 UG/1; UG/1
2 AEROSOL RESPIRATORY (INHALATION) 2 TIMES DAILY
Qty: 3 EACH | Refills: 1 | Status: SHIPPED | OUTPATIENT
Start: 2024-04-24

## 2024-04-24 NOTE — PROGRESS NOTES
HPI:   Sophia Rendon is a 73 year old female here to follow up on abdominal pain     On Sunday pt had pizza      Pt has a dairy sensitivity   Pt had epigastric pain that wrapped around to left and right - started 2.5 hours after eating and lasted 10pm to 7am // had to sleep in a chair   + nauseated   Pt felt chilled   GB still intact   No h/o kidney stone   No diarrhea   No constipation   Pt felt chilled   Sore in RUQ but overall better since Monday morning   Stools not pale in color   Marin diet since Monday - bread and apple sauce   No GERD   No dysuria       Current Outpatient Medications   Medication Sig Dispense Refill    levothyroxine (SYNTHROID) 88 MCG Oral Tab Take 1 tablet (88 mcg total) by mouth before breakfast. 90 tablet 3    fluticasone-umeclidin-vilant (TRELEGY ELLIPTA) 200-62.5-25 MCG/ACT Inhalation Aerosol Powder, Breath Activated Inhale 1 puff into the lungs daily. 3 each 0    Ipratropium-Albuterol (COMBIVENT RESPIMAT)  MCG/ACT Inhalation Aero Soln Inhale 1 puff into the lungs 4 (four) times daily. 12 g 1    Butalbital-APAP-Caffeine -40 MG Oral Tab Take 1 tablet by mouth every 6 (six) hours as needed for Pain. Take one to 2 tablets as needed 30 tablet 0    Vortioxetine HBr (TRINTELLIX OR) Take 2.5 mg by mouth every other day.      nitroGLYCERIN (NITROSTAT) 0.4 MG Sublingual SL Tab Place 1 tablet (0.4 mg total) under the tongue every 5 (five) minutes as needed. 30 tablet 0    aspirin EC 81 MG Oral Tab EC Take 1 tablet (81 mg total) by mouth daily. 30 tablet 11    Metoprolol Succinate ER (TOPROL XL) 25 MG Oral Take 0.5 tablets (12.5 mg total) by mouth daily. Takes 1/2 tablet (12.5 mg) daily      simvastatin (ZOCOR) 10 MG Oral Tab Take 1 tablet (10 mg total) by mouth nightly.      alprazolam (XANAX) 0.5 MG Oral Tab Take 1 tablet (0.5 mg total) by mouth. Take one tablet night PRN up to 4 tablets daily        Past Medical History:    Colon cancer screening    COPD (chronic obstructive  pulmonary disease)    Depression    Diverticulitis    Fall    Goiter    Graves disease    Heart attack (HCC)    4 stents    Heart disease    History of appendectomy    History of hysterectomy    Hypothyroid    Migraines    Ovarian mass    Pneumonia    Pneumonia, organism unspecified(486)    Post PTCA    Post traumatic stress disorder (PTSD)    Tobacco abuse    Unspecified essential hypertension      Past Surgical History:   Procedure Laterality Date    Angioplasty (coronary)      With STENT    Appendectomy      Appendectomy,w othr proc      right OOPHORECTOMY    Hysterectomy  2012    total hystero.    Susie localization wire 1 site left (cpt=19281) Left 1980    benign.    Susie localization wire 1 site right (cpt=19281) Right 1990    benign.    Oophorectomy Bilateral 2012    total hystero.    Other surgical history      Fibroadenoma    Other surgical history      Ruptured cyst- right ovary    Tonsillectomy        Family History   Problem Relation Age of Onset    Endocrine Disorder Father         cushing's disease    Other (Other) Father     Psychiatric Mother         Severe Depression, anxiety    Cancer Mother         Heart    Other (Other) Mother         Hypothyroidism    Other (Other) Other         stroke, a-fib    Migraines Other       Social History:   Social History     Socioeconomic History    Marital status:    Occupational History    Occupation:    Tobacco Use    Smoking status: Every Day     Current packs/day: 1.00     Types: Cigarettes    Smokeless tobacco: Never   Substance and Sexual Activity    Alcohol use: Yes     Comment: minimal    Drug use: No   Other Topics Concern    Caffeine Concern No    Exercise Yes     Social Determinants of Health     Physical Activity: Inactive (12/22/2020)    [place] [place]    Exercise Vital Sign     Days of Exercise per Week: 0 days     Minutes of Exercise per Session: 0 min     Occ: . Children:  2  Retired   Exercise: minimal.  Diet: watches sugar closely     REVIEW OF SYSTEMS:   GENERAL: feels well otherwise  SKIN: denies any unusual skin lesions  EYES:denies blurred vision or double vision  HEENT: denies nasal congestion, sinus pain or ST  LUNGS: denies shortness of breath with exertion  CARDIOVASCULAR: denies chest pain on exertion  GI: denies abdominal pain,denies heartburn  : denies dysuria, vaginal discharge or itching   MUSCULOSKELETAL: denies back pain  NEURO: denies headaches  PSYCHE: denies depression or anxiety  HEMATOLOGIC: denies hx of anemia  ENDOCRINE: denies thyroid history  ALL/ASTHMA: + COPD     EXAM:   /78   Pulse 77   Resp 16   Ht 5' 3.5\" (1.613 m)   Wt 139 lb (63 kg)   SpO2 96%   BMI 24.24 kg/m²   Body mass index is 24.24 kg/m².   GENERAL: alert and oriented X 3, well developed, well nourished,in no apparent distress  CARDIO: RRR without murmur  LUNGS: expiratory wheezing   NECK: supple,no adenopathy,no thyromegaly  HEENT: atraumatic, normocephalic,ears and throat are clear  EYES:PERRLA, EOMI, normal,conjunctiva are clear  SKIN: norashes,no suspicious lesions  GI: good BS's,no masses, HSM + RUQ tenderness + epigastric tenderness   CHEST: no chest tenderness  MUSCULOSKELETAL: back is not tender,FROM of the back  EXTREMITIES: no cyanosis, clubbing or edema  NEURO: cranial nerves are intact,motor and sensory are grossly intact    ASSESSMENT AND PLAN:   Sophia Rnedon is a 73 year old female here with     1. Epigastric pain    - z [facility] US ABDOMEN COMPLETE (CPT=76700); Future  - Comp Metabolic Panel (14); Future  - CBC With Differential With Platelet; Future  - Lipase [E]; Future  - z [facility] US ABDOMEN COMPLETE (CPT=76700)  - URINALYSIS, AUTO, W/O SCOPE  - Urine Culture, Routine [E]; Future  - Urine Culture, Routine [E]    2. RUQ pain    - z [facility] US ABDOMEN COMPLETE (CPT=76700); Future  - Comp Metabolic Panel (14); Future  - CBC With Differential With Platelet;  Future  - Lipase [E]; Future  - z [facility] US ABDOMEN COMPLETE (CPT=76700)  - URINALYSIS, AUTO, W/O SCOPE  - Urine Culture, Routine [E]; Future  - Urine Culture, Routine [E]      RTC in 1 mo or sooner if needed  Questions answered. Patient expressed understanding                           X Size Of Lesion In Cm: 0 Biopsy Type: H and E Electrodesiccation And Curettage Text: The wound bed was treated with electrodesiccation and curettage after the biopsy was performed. Bill [id number] For Specimen Handling/Conveyance To Laboratory?: no Biopsy Method: Double edge Personna blades Wound Care: Vaseline Post-Care Instructions: I reviewed with the patient in detail post-care instructions. Patient is to keep the biopsy site dry overnight, and then apply Vaseline twice daily under bandaid until healed. Pt was instructed on signs of infection and to call with concerns. Lab: [id number] Dressing: bandage Billing Type: Third-Party Bill Cryotherapy Text: The wound bed was treated with cryotherapy after the biopsy was performed. Hemostasis: Drysol Curettage Text: The wound bed was treated with curettage after the biopsy was performed. Notification Instructions: Patient will be notified of biopsy results. However, patient instructed to call the office if not contacted within 2 weeks. Anesthesia Type: 1% lidocaine with epinephrine Render Post-Care Instructions In Note?: yes Electrodesiccation Text: The wound bed was treated with electrodesiccation after the biopsy was performed. Consent: Written consent was obtained and risks were reviewed including but not limited to scarring, infection, bleeding, scabbing, incomplete removal, nerve damage and allergy to anesthesia. Detail Level: Detailed Anesthesia Volume In Cc (Will Not Render If 0): 0.8 Type Of Destruction Used: Curettage Lab Facility: 97 Silver Nitrate Text: The wound bed was treated with silver nitrate after the biopsy was performed.

## 2024-05-01 DIAGNOSIS — K81.9 CHOLECYSTITIS: Primary | ICD-10-CM

## 2024-05-31 ENCOUNTER — TELEPHONE (OUTPATIENT)
Dept: FAMILY MEDICINE CLINIC | Facility: CLINIC | Age: 74
End: 2024-05-31

## 2024-05-31 DIAGNOSIS — E03.9 HYPOTHYROIDISM, UNSPECIFIED TYPE: Primary | ICD-10-CM

## 2024-05-31 DIAGNOSIS — E55.9 VITAMIN D DEFICIENCY: ICD-10-CM

## 2024-05-31 DIAGNOSIS — I25.10 CORONARY ARTERY DISEASE INVOLVING NATIVE HEART, UNSPECIFIED VESSEL OR LESION TYPE, UNSPECIFIED WHETHER ANGINA PRESENT: ICD-10-CM

## 2024-05-31 DIAGNOSIS — E78.5 HYPERLIPIDEMIA, UNSPECIFIED HYPERLIPIDEMIA TYPE: ICD-10-CM

## 2024-05-31 NOTE — TELEPHONE ENCOUNTER
Patient has annual physical scheduled 08/21/24.  She wishes to have labs done sometime in August prior to her exam.  She has cbc and cmp done 04/24/24  Ok to repeat these along with usual physical lab panel?

## 2024-06-03 NOTE — TELEPHONE ENCOUNTER
Labs ordered   Left message on voicemail informing her and instructing her to fast prior to lab draw.

## 2024-06-14 ENCOUNTER — OFFICE VISIT (OUTPATIENT)
Dept: FAMILY MEDICINE CLINIC | Facility: CLINIC | Age: 74
End: 2024-06-14
Payer: MEDICARE

## 2024-06-14 VITALS
WEIGHT: 132 LBS | DIASTOLIC BLOOD PRESSURE: 72 MMHG | OXYGEN SATURATION: 96 % | SYSTOLIC BLOOD PRESSURE: 130 MMHG | RESPIRATION RATE: 16 BRPM | BODY MASS INDEX: 23.1 KG/M2 | HEIGHT: 63.5 IN | HEART RATE: 81 BPM

## 2024-06-14 DIAGNOSIS — Z80.3 FAMILY HISTORY OF BREAST CANCER IN SISTER: ICD-10-CM

## 2024-06-14 DIAGNOSIS — H25.13 AGE-RELATED NUCLEAR CATARACT OF BOTH EYES: ICD-10-CM

## 2024-06-14 DIAGNOSIS — N64.4 BREAST PAIN, RIGHT: Primary | ICD-10-CM

## 2024-06-14 DIAGNOSIS — N63.11 MASS OF UPPER OUTER QUADRANT OF RIGHT BREAST: ICD-10-CM

## 2024-06-14 DIAGNOSIS — H35.30 MACULAR DEGENERATION OF BOTH EYES, UNSPECIFIED TYPE: ICD-10-CM

## 2024-06-14 PROCEDURE — 1160F RVW MEDS BY RX/DR IN RCRD: CPT | Performed by: PHYSICIAN ASSISTANT

## 2024-06-14 PROCEDURE — 3008F BODY MASS INDEX DOCD: CPT | Performed by: PHYSICIAN ASSISTANT

## 2024-06-14 PROCEDURE — 99214 OFFICE O/P EST MOD 30 MIN: CPT | Performed by: PHYSICIAN ASSISTANT

## 2024-06-14 PROCEDURE — 3078F DIAST BP <80 MM HG: CPT | Performed by: PHYSICIAN ASSISTANT

## 2024-06-14 PROCEDURE — 3075F SYST BP GE 130 - 139MM HG: CPT | Performed by: PHYSICIAN ASSISTANT

## 2024-06-14 PROCEDURE — 1159F MED LIST DOCD IN RCRD: CPT | Performed by: PHYSICIAN ASSISTANT

## 2024-06-14 NOTE — PROGRESS NOTES
Subjective:   Patient ID: Sophia Rendon is a 73 year old female.    HPI  Patient presents for evaluation of breast lump that she noticed a couple weeks ago  Started with waking in the morning with breast pain  Went away but came back and lingered for a few days  She felt around the painful area and thinks there is a lump there  At this point the soreness is better but the lump is still present  Next to the lump is a much smaller hard lump  She has h/o 2 fibroadenomas removed  Her younger sister was diagnosed with breast cancer last year and positive for BRCA gene  Patient has not been tested for this  Her last mammogram was 9/28/22 showing dense breast tissue bilaterally and stable scattered calcifications in the left breast      History/Other:   Review of Systems   Constitutional:  Negative for chills, diaphoresis and fever.   Eyes:  Positive for visual disturbance.   Genitourinary:         Right breast pain and mass     Current Outpatient Medications   Medication Sig Dispense Refill    fluticasone-umeclidin-vilant (TRELEGY ELLIPTA) 200-62.5-25 MCG/ACT Inhalation Aerosol Powder, Breath Activated Inhale 1 puff into the lungs daily. 3 each 0    Budesonide-Formoterol Fumarate (SYMBICORT) 160-4.5 MCG/ACT Inhalation Aerosol Inhale 2 puffs into the lungs 2 (two) times daily. 3 each 1    levothyroxine (SYNTHROID) 88 MCG Oral Tab Take 1 tablet (88 mcg total) by mouth before breakfast. 90 tablet 3    Vortioxetine HBr (TRINTELLIX OR) Take 2.5 mg by mouth every other day.      aspirin EC 81 MG Oral Tab EC Take 1 tablet (81 mg total) by mouth daily. 30 tablet 11    Metoprolol Succinate ER (TOPROL XL) 25 MG Oral Take 0.5 tablets (12.5 mg total) by mouth daily. Takes 1/2 tablet (12.5 mg) daily      simvastatin (ZOCOR) 10 MG Oral Tab Take 1 tablet (10 mg total) by mouth nightly.      alprazolam (XANAX) 0.5 MG Oral Tab Take 1 tablet (0.5 mg total) by mouth. Take one tablet night PRN up to 4 tablets daily       Ipratropium-Albuterol (COMBIVENT RESPIMAT)  MCG/ACT Inhalation Aero Soln Inhale 1 puff into the lungs 4 (four) times daily. (Patient not taking: Reported on 6/14/2024) 12 g 1    Butalbital-APAP-Caffeine -40 MG Oral Tab Take 1 tablet by mouth every 6 (six) hours as needed for Pain. Take one to 2 tablets as needed (Patient not taking: Reported on 6/14/2024) 30 tablet 0    nitroGLYCERIN (NITROSTAT) 0.4 MG Sublingual SL Tab Place 1 tablet (0.4 mg total) under the tongue every 5 (five) minutes as needed. (Patient not taking: Reported on 6/14/2024) 30 tablet 0     Allergies:  Allergies   Allergen Reactions    Penicillin G CARDIAC ARREST    Sulfa Antibiotics CARDIAC ARREST    Berries UNKNOWN    Chicken Allergy      And ALL FOWL type bird    Chocolate     Codeine     Dairy Products     Eggs Or Egg-Derived Products     Epinephrine OTHER (SEE COMMENTS)     Injection    Grass      trees    Molds & Smuts     Mushrooms     Soy [Isoflavones, Soy]        Objective:   Physical Exam  Vitals and nursing note reviewed.   Constitutional:       Appearance: Normal appearance.   Chest:   Breasts:     Right: Mass and tenderness present. No swelling, bleeding, inverted nipple, nipple discharge or skin change.      Left: No swelling, bleeding, inverted nipple, nipple discharge, skin change or tenderness.       Neurological:      Mental Status: She is alert.         Assessment & Plan:   1. Breast pain, right  2. Mass of upper outer quadrant of right breast  3. Family history of breast cancer in sister  Given her symptoms and family history, will check diagnostic mammogram. Follow up pending results to discuss next steps.   - [facility] IAN 2D+3D DIAGNOSTIC [facility]  BILAT (CPT=77066/54398); Future    4. Macular degeneration of both eyes, unspecified type  5. Age-related nuclear cataract of both eyes  Given referral to new eye doctor per request.   - Ophthalmology Referral - In Network

## 2024-06-20 ENCOUNTER — OFFICE VISIT (OUTPATIENT)
Facility: LOCATION | Age: 74
End: 2024-06-20

## 2024-06-20 VITALS — HEART RATE: 82 BPM | TEMPERATURE: 98 F | OXYGEN SATURATION: 94 %

## 2024-06-20 DIAGNOSIS — K80.20 SYMPTOMATIC CHOLELITHIASIS: Primary | ICD-10-CM

## 2024-06-20 PROCEDURE — 1160F RVW MEDS BY RX/DR IN RCRD: CPT | Performed by: STUDENT IN AN ORGANIZED HEALTH CARE EDUCATION/TRAINING PROGRAM

## 2024-06-20 PROCEDURE — 1159F MED LIST DOCD IN RCRD: CPT | Performed by: STUDENT IN AN ORGANIZED HEALTH CARE EDUCATION/TRAINING PROGRAM

## 2024-06-20 PROCEDURE — 99202 OFFICE O/P NEW SF 15 MIN: CPT | Performed by: STUDENT IN AN ORGANIZED HEALTH CARE EDUCATION/TRAINING PROGRAM

## 2024-06-20 NOTE — H&P
Patient ID: Sophia Rendon is a 73 year old female.    Chief Complaint   Patient presents with    New Patient     NP - Cholecystitis, ULTRASOUND ABDOMEN COMPLETE 4/30/24, Ultrasound Test (Scan) 6/3/24, low fat diet, no symptoms since the diet, discuss steps moving further.       HPI: Sophia Rendon is a 73 year old female presents to clinic for evaluation.  Patient reports having an episode of severe abdominal pain with radiation to her back in the middle of her shoulder blades back in April.  Patient denies any nausea or vomiting during this time.  After approximately 9 hours, her pain subsided.  She had a subsequent attack couple days later which lasted about an hour and another attack which lasted less than an hour.  Patient presented to her PCP who performed an ultrasound which showed cholelithiasis.  Patient was referred to general surgery for further evaluation.    Today, patient reports no symptoms for the past month.  She has been sticking religiously to a low-fat diet.  She denies any episodes of abdominal pain, nausea, or vomiting.  Her abdominal surgeries include hysterectomy and an appendectomy.    Of note, patient was recently diagnosed with breast lump.  Patient's younger sister was diagnosed with breast cancer last year and is BRCA positive.  Patient has never been tested.    Patient has a past medical history for MI with 4 stents and COPD    Workup:   Ultrasound abdomen 4/30/2024  FINDINGS:   Liver demonstrates normal parenchymal echotexture and no space-occupying liver mass. Liver measures longitudinally 16.8 cm.     The gallbladder demonstrates sludge and stones. Focal thickening of the gallbladder wall and dome of the gallbladder. There may be a small amount of associated calcification and possibly fluid. The remainder of the gallbladder wall is slightly thickened.    The patient reports tenderness over the gallbladder during the examination. Common bile duct measures 6.3 mm.     The kidneys are  normal in size measuring on the right  11.2 x 4.5 x 5.1 cm. and on the left 10.8 x 4.7 x 4.6 cm.  Kidneys demonstrate normal renal cortex thickness and echogenicity.  No hydronephrosis or echogenic renal calculi.  No obstructive uropathy.     The ultrasound evaluation of the pancreas is inherently limited in evaluation due to adjacent bowel gas. Visualized portions are negative for suspicious abnormality. Aortic atherosclerosis without aneurysmal dilatation. Spleen measures 11.7 x 4.9 x 5.0   cm.     Past Medical History  Past Medical History:    Colon cancer screening    COPD (chronic obstructive pulmonary disease)    Depression    Diverticulitis    Fall    Goiter    Graves disease    Heart attack (HCC)    4 stents    Heart disease    History of appendectomy    History of hysterectomy    Hypothyroid    Migraines    Ovarian mass    Pneumonia    Pneumonia, organism unspecified(486)    Post PTCA    Post traumatic stress disorder (PTSD)    Tobacco abuse    Unspecified essential hypertension       Past Surgical History  Past Surgical History:   Procedure Laterality Date    Angioplasty (coronary)      With STENT    Appendectomy      Appendectomy,w othr proc      right OOPHORECTOMY    Hysterectomy  2012    total hystero.    Susie localization wire 1 site left (cpt=19281) Left 1980    benign.    Susie localization wire 1 site right (cpt=19281) Right 1990    benign.    Oophorectomy Bilateral 2012    total hystero.    Other surgical history      Fibroadenoma    Other surgical history      Ruptured cyst- right ovary    Tonsillectomy         Medications  Current Outpatient Medications   Medication Sig Dispense Refill    fluticasone-umeclidin-vilant (TRELEGY ELLIPTA) 200-62.5-25 MCG/ACT Inhalation Aerosol Powder, Breath Activated Inhale 1 puff into the lungs daily. 3 each 0    Budesonide-Formoterol Fumarate (SYMBICORT) 160-4.5 MCG/ACT Inhalation Aerosol Inhale 2 puffs into the lungs 2 (two) times daily. 3 each 1    levothyroxine  (SYNTHROID) 88 MCG Oral Tab Take 1 tablet (88 mcg total) by mouth before breakfast. 90 tablet 3    Ipratropium-Albuterol (COMBIVENT RESPIMAT)  MCG/ACT Inhalation Aero Soln Inhale 1 puff into the lungs 4 (four) times daily. (Patient not taking: Reported on 6/14/2024) 12 g 1    Butalbital-APAP-Caffeine -40 MG Oral Tab Take 1 tablet by mouth every 6 (six) hours as needed for Pain. Take one to 2 tablets as needed (Patient not taking: Reported on 6/14/2024) 30 tablet 0    Vortioxetine HBr (TRINTELLIX OR) Take 2.5 mg by mouth every other day.      nitroGLYCERIN (NITROSTAT) 0.4 MG Sublingual SL Tab Place 1 tablet (0.4 mg total) under the tongue every 5 (five) minutes as needed. (Patient not taking: Reported on 6/14/2024) 30 tablet 0    aspirin EC 81 MG Oral Tab EC Take 1 tablet (81 mg total) by mouth daily. 30 tablet 11    Metoprolol Succinate ER (TOPROL XL) 25 MG Oral Take 0.5 tablets (12.5 mg total) by mouth daily. Takes 1/2 tablet (12.5 mg) daily      simvastatin (ZOCOR) 10 MG Oral Tab Take 1 tablet (10 mg total) by mouth nightly.      alprazolam (XANAX) 0.5 MG Oral Tab Take 1 tablet (0.5 mg total) by mouth. Take one tablet night PRN up to 4 tablets daily         Allergies  Allergies   Allergen Reactions    Epinephrine OTHER (SEE COMMENTS)     Injection    Penicillin G CARDIAC ARREST    Sulfa Antibiotics CARDIAC ARREST    Berries UNKNOWN    Chicken Allergy      And ALL FOWL type bird    Chocolate     Codeine     Dairy Products     Eggs Or Egg-Derived Products     Grass      trees    Molds & Smuts     Mushrooms     Soy [Isoflavones, Soy]        Social History  History   Smoking Status    Every Day    Types: Cigarettes   Smokeless Tobacco    Never     History   Alcohol Use    Yes     Comment: minimal     History   Drug Use No       Family History  Family History   Problem Relation Age of Onset    Endocrine Disorder Father         cushing's disease    Other (Other) Father     Psychiatric Mother         Severe  Depression, anxiety    Cancer Mother         Heart    Other (Other) Mother         Hypothyroidism    Other (Other) Other         stroke, a-fib    Migraines Other        Review of Systems  Review of Systems   Constitutional: Negative.    Respiratory: Negative.     Cardiovascular: Negative.    Gastrointestinal:  Positive for abdominal pain. Negative for abdominal distention, constipation, diarrhea, nausea and vomiting.       Exam  Vitals:    06/20/24 1347   Pulse: 82   Temp: 97.9 °F (36.6 °C)     Physical Exam  Constitutional:       Appearance: Normal appearance.   Cardiovascular:      Rate and Rhythm: Normal rate.   Pulmonary:      Effort: Pulmonary effort is normal.   Abdominal:      General: Abdomen is flat. There is no distension.      Palpations: Abdomen is soft.      Tenderness: There is no abdominal tenderness.   Skin:     General: Skin is warm and dry.   Neurological:      Mental Status: She is alert and oriented to person, place, and time.           Assessment/Plan  Assessment   Problem List Items Addressed This Visit          Gastrointestinal and Abdominal    Symptomatic cholelithiasis - Primary       Sophia Rendon is a 73 year old female with symptomatic cholelithiasis    Ultrasound images were reviewed personally by me and with the patient  Ultrasound shows lots of sludge as well as large stone in her gallbladder, CBD 6.3 mm  Patient's previous labs showed all normal LFTs  Based on patient's ultrasound imaging and her history, I believe she has symptomatic cholelithiasis  Patient would benefit from laparoscopic cholecystectomy with ICG guidance  The procedure was briefly explained to the patient  Risks include bleeding, pain, infection, injury to the common bile duct, bile leak, and need for further intervention were also discussed    At this time, due to patient's new breast mass, we will hold on surgical intervention  Patient should continue a low-fat diet as this is keeping her symptoms at bay  If she  has recurrent pain that is severe like her first episode or even worse, she should go to the emergency room to be evaluated for acute cholecystitis  Once patient is ready to move forward with surgery, she should obtain both medical and cardiac clearance    Patient can call my office for any questions or concerns        Latia Goodman MD  General Surgery  [facility]     CC:  Mallory Johnson DO

## 2024-06-24 ENCOUNTER — HOSPITAL ENCOUNTER (OUTPATIENT)
Dept: MAMMOGRAPHY | Facility: HOSPITAL | Age: 74
Discharge: HOME | End: 2024-06-24
Attending: PHYSICIAN ASSISTANT

## 2024-06-24 DIAGNOSIS — N63.11 MASS OF UPPER OUTER QUADRANT OF RIGHT BREAST: ICD-10-CM

## 2024-06-24 DIAGNOSIS — N64.4 BREAST PAIN, RIGHT: ICD-10-CM

## 2024-06-24 DIAGNOSIS — Z80.3 FAMILY HISTORY OF BREAST CANCER IN SISTER: ICD-10-CM

## 2024-06-24 PROCEDURE — 76642 ULTRASOUND BREAST LIMITED: CPT | Performed by: PHYSICIAN ASSISTANT

## 2024-06-24 PROCEDURE — 77066 DX MAMMO INCL CAD BI: CPT | Performed by: PHYSICIAN ASSISTANT

## 2024-06-24 PROCEDURE — 77062 BREAST TOMOSYNTHESIS BI: CPT | Performed by: PHYSICIAN ASSISTANT

## 2024-06-24 NOTE — IMAGING NOTE
This Breast Care RN assisted Dr. Johnson with recommendation for a right breast 1 site ultrasound guided biopsy for palpable mass.  Procedure reviewed and all questions answered. Emotional and educational support given. On the day of the biopsy, pt instructed to take Tylenol 1000mg PO, eat a light meal & bring or wear a sports bra.  Post biopsy care also reviewed with pt to include NO lifting more than 5lbs, no exercising or housework (limit upper body movement) for 24-48 hrs post biopsy. Patient denies blood thinners, bleeding disorders, liver disease, and chemo. Pt verbalized understanding.  Our breast center schedulers will be calling to schedule an appt that is convenient for pt.

## 2024-06-25 ENCOUNTER — TELEPHONE (OUTPATIENT)
Dept: FAMILY MEDICINE CLINIC | Facility: CLINIC | Age: 74
End: 2024-06-25

## 2024-06-25 NOTE — TELEPHONE ENCOUNTER
Patient is looking for guidance.  Did a mammogram, then was ordered an ultra sound.  Received a letter from Radiology yesterday stating they recommend a biopsy and to get PCP input on their feelings.    Patient is scheduled for Biopsy Thursday June 27th and wants to know what she should do.    Please advise;

## 2024-06-26 NOTE — TELEPHONE ENCOUNTER
Pt informed and verbalized understanding.Pt states she is scheduled for biopsy tomorrow and states she will go.

## 2024-06-26 NOTE — TELEPHONE ENCOUNTER
Yes, I recommend proceeding with biopsy. Her younger sister was dx with breast cancer recently and positive for BRCA. Given this history and her exam findings I would have her get biopsy.

## 2024-06-27 ENCOUNTER — TELEPHONE (OUTPATIENT)
Dept: FAMILY MEDICINE CLINIC | Facility: CLINIC | Age: 74
End: 2024-06-27

## 2024-06-27 ENCOUNTER — HOSPITAL ENCOUNTER (OUTPATIENT)
Dept: MAMMOGRAPHY | Facility: HOSPITAL | Age: 74
Discharge: HOME | End: 2024-06-27
Attending: PHYSICIAN ASSISTANT
Payer: MEDICARE

## 2024-06-27 DIAGNOSIS — N63.0 BREAST MASS: ICD-10-CM

## 2024-06-27 DIAGNOSIS — R92.8 OTHER ABNORMAL AND INCONCLUSIVE FINDINGS ON DIAGNOSTIC IMAGING OF BREAST: ICD-10-CM

## 2024-06-27 PROCEDURE — 88305 TISSUE EXAM BY PATHOLOGIST: CPT | Performed by: PHYSICIAN ASSISTANT

## 2024-06-27 PROCEDURE — 19083 BX BREAST 1ST LESION US IMAG: CPT | Performed by: PHYSICIAN ASSISTANT

## 2024-06-27 PROCEDURE — 77065 DX MAMMO INCL CAD UNI: CPT | Performed by: PHYSICIAN ASSISTANT

## 2024-06-27 PROCEDURE — 88360 TUMOR IMMUNOHISTOCHEM/MANUAL: CPT | Performed by: PHYSICIAN ASSISTANT

## 2024-06-27 PROCEDURE — 88342 IMHCHEM/IMCYTCHM 1ST ANTB: CPT | Performed by: PHYSICIAN ASSISTANT

## 2024-06-27 PROCEDURE — 88341 IMHCHEM/IMCYTCHM EA ADD ANTB: CPT | Performed by: PHYSICIAN ASSISTANT

## 2024-07-01 ENCOUNTER — TELEPHONE (OUTPATIENT)
Dept: MAMMOGRAPHY | Facility: HOSPITAL | Age: 74
End: 2024-07-01

## 2024-07-01 NOTE — TELEPHONE ENCOUNTER
Telephoned Sophia Rendon and name,  verified with patient. Notified Sophia Rendon of right breast positive for ILC biopsy result. Concordance pending. Sophia Rendon reports biopsy site is healing well. Radiologist recommends surgical consultation. Dr Noonan's office is referring patient to Dr Yu. Patient was provided with the contact information for Dr Yu, the Breast Program Navigators, and myself and informed that one of us will be calling her back with an appt date and time with Dr Yu.  Pt verbalized understanding and had no further questions at this time.

## 2024-07-03 ENCOUNTER — TELEPHONE (OUTPATIENT)
Dept: FAMILY MEDICINE CLINIC | Facility: CLINIC | Age: 74
End: 2024-07-03

## 2024-07-03 ENCOUNTER — TELEPHONE (OUTPATIENT)
Dept: HEMATOLOGY/ONCOLOGY | Facility: HOSPITAL | Age: 74
End: 2024-07-03

## 2024-07-03 ENCOUNTER — TELEPHONE (OUTPATIENT)
Dept: MAMMOGRAPHY | Facility: HOSPITAL | Age: 74
End: 2024-07-03

## 2024-07-03 NOTE — TELEPHONE ENCOUNTER
Called and spoke to patient. Per Mindy Noonan's office RN, patient to be referred to Dr Yu or Dr Queen. Patient accepted an appt with Dr Queen on 7-9-24 at 2:30. Patient has no further questions at this time.

## 2024-07-03 NOTE — TELEPHONE ENCOUNTER
Pinky with breast center calling  and wants to make sure it is okay for pt to see Dr Queen?  She can't see Dr Yu until 7/30/24.Please advise.

## 2024-07-03 NOTE — TELEPHONE ENCOUNTER
Phoned patient and we discussed newly diagnosed breast cancer. Introduced myself as one of the breast nurse navigators and explained the role of the breast nurse navigator. Explained the role of the physicians on her breast cancer care team. Reviewed over pathology report. Briefly discussed breast cancer treatments including surgery, radiation, hormone therapy, and chemotherapy. Explained the breast cancer multidisciplinary meeting and that her case will be discussed. Patient is scheduled with Dr. Queen on Tuesday July 9, 2024 in [place]. Patient given my contact information to call with any further questions or concerns.

## 2024-07-08 NOTE — CONSULTS
Breast Surgery New Patient Consultation    Sophia Rendon is a 73 year old patient, referred by Dr. Johnson, who presents for evaluation of right breast cancer.    History of Present Illness:   Ms. Sophia Rendon is a 73 year old woman with a past history significant for myocardial infarction (4 stents), COPD, and newly discovered cholelithiasis who presents for evaluation of right breast cancer. She is taking Asa 81 mg daily. She smokes about one pack per day.     She underwent diagnostic imaging on 6/24/2024 for a new soreness and palpable lump of the retroareolar right breast.  Ultrasound showed a 10 x 7 x 8 mm mass at the 9 o'clock position and a normal caliber right axillary lymph node.  Of note she has density B breast tissue.  Biopsy was performed on 6/27/2024 and a butterfly clip was placed.  Pathology showed invasive lobular carcinoma, grade 1, ER positive, WI negative, HER2 negative.    She denies any nipple discharge, skin changes, or axillary adenopathy. She does not have breast pain.  She had a left breast biopsy in 1980 and a right breast biopsy in 1998 that were benign (fibroadenoma).  She does have family history of breast cancer.  Her sister had breast cancer at age 65 and is positive for CHEK2 mutation.  She does not have a history of genetic testing and is scheduled for later today.          Past Medical History:    Colon cancer screening    COPD (chronic obstructive pulmonary disease)    Depression    Diverticulitis    Fall    Goiter    Graves disease    Heart attack (HCC)    4 stents    Heart disease    History of appendectomy    History of hysterectomy    Hypothyroid    Migraines    Ovarian mass    Pneumonia    Pneumonia, organism unspecified(486)    Post PTCA    Post traumatic stress disorder (PTSD)    Tobacco abuse    Unspecified essential hypertension       Past Surgical History:   Procedure Laterality Date    Angioplasty (coronary)      With STENT    Appendectomy      Appendectomy,w othr  proc      right OOPHORECTOMY    Hysterectomy      total hystero.    Susie localization wire 1 site left (cpt=19281) Left     benign.    Susie localization wire 1 site right (cpt=19281) Right     benign.    Oophorectomy Bilateral     total hystero.    Other surgical history      Fibroadenoma    Other surgical history      Ruptured cyst- right ovary    Tonsillectomy         Gynecological History:  Menarche at age 12 and LMP   Pt is a   Pt was n/a years old at time of first pregnancy.    She denies any cumulative breastfeeding history   Age of Menopause: 50    She denies any history of hormone replacement therapy   She has history of oral contraceptive use for 3 years, last .   She denies infertility treatment to achieve pregnancy.    Medications:    No outpatient medications have been marked as taking for the 24 encounter (Office Visit) with Reyes Queen MD.       Allergies:    Allergies   Allergen Reactions    Epinephrine OTHER (SEE COMMENTS)     Injection    Penicillin G CARDIAC ARREST    Sulfa Antibiotics CARDIAC ARREST    Berries UNKNOWN    Chicken Allergy      And ALL FOWL type bird    Chocolate     Codeine     Dairy Products     Eggs Or Egg-Derived Products     Grass      trees    Molds & Smuts     Mushrooms     Soy [Isoflavones, Soy]        Family History:   Family History   Problem Relation Age of Onset    Psychiatric Mother         Severe Depression, anxiety    Cancer Mother         Heart    Other (Other) Mother         Hypothyroidism    Endocrine Disorder Father         cushing's disease    Other (Other) Father     Other (CHEK-2+) Sister     Breast Cancer Sister 65    Other (Other) Other         stroke, a-fib    Migraines Other        She is not of Ashkenazi Sabianism ancestry.    Social History:  History   Alcohol Use    Yes     Comment: minimal       History   Smoking Status    Every Day    Types: Cigarettes   Smokeless Tobacco    Never       Review of Systems:    Review of  Systems   Constitutional:  Negative for activity change, appetite change, chills, fatigue and unexpected weight change.   HENT:  Negative for ear pain, hearing loss, nosebleeds, sore throat, trouble swallowing and voice change.    Eyes:  Negative for pain and visual disturbance.   Respiratory:  Negative for cough, chest tightness and shortness of breath.    Cardiovascular:  Negative for chest pain, palpitations and leg swelling.   Gastrointestinal:  Negative for nausea, vomiting, abdominal pain, diarrhea, constipation and blood in stool.   Endocrine: Negative for cold intolerance and heat intolerance.   Genitourinary:  Negative for dysuria, hematuria and difficulty urinating.   Musculoskeletal:  Negative for back pain, joint swelling, joint pain and neck pain.   Skin:  Negative for color change, rash and wound.   Allergic/Immunologic: Negative for environmental allergies.   Neurological:  Negative for tremors, syncope, facial asymmetry, speech difficulty and weakness.   Hematological:  Negative for adenopathy. Does not bruise/bleed easily.   Psychiatric/Behavioral:  Negative for hallucinations, behavioral problems, confusion, agitation and depressed mood.       Otherwise as per HPI.    Physical Exam:    /78 (BP Location: Right arm, Patient Position: Sitting, Cuff Size: adult)   Pulse 81   Temp 96.9 °F (36.1 °C)   Resp 16   Wt 61.2 kg (135 lb)   SpO2 94%   BMI 23.54 kg/m²     Physical Exam  Vitals reviewed.   Constitutional:       Appearance: Normal appearance.      Comments: Hoarse voice   HENT:      Head: Normocephalic and atraumatic.   Eyes:      Extraocular Movements: Extraocular movements intact.      Pupils: Pupils are equal, round, and reactive to light.   Cardiovascular:      Rate and Rhythm: Normal rate.   Pulmonary:      Effort: Pulmonary effort is normal.   Chest:   Breasts:     Right: Normal. No mass, nipple discharge, skin change or tenderness.      Left: Normal. No mass, nipple discharge,  skin change or tenderness.          Comments: Well healed biopsy scar  Abdominal:      General: Abdomen is flat.      Palpations: Abdomen is soft.   Musculoskeletal:         General: Normal range of motion.      Cervical back: Normal range of motion and neck supple.   Lymphadenopathy:      Upper Body:      Right upper body: No supraclavicular or axillary adenopathy.      Left upper body: No supraclavicular or axillary adenopathy.   Skin:     General: Skin is warm and dry.   Neurological:      General: No focal deficit present.      Mental Status: She is alert and oriented to person, place, and time.   Psychiatric:         Mood and Affect: Mood normal.            Labs/imaging: reviewed in EPIC    Impression:   Ms. Sophia Rendon is a 73 year old woman that presents for right breast invasive lobular carcinoma, clinical anatomic stage 1A, prognostic stage 1A.    Discussion and Plan:  I had a discussion with the Patient regarding her breast exam. On exam today I found biopsy site changes. I personally reviewed her recent imaging and pathology and we discussed this.   I explained in brief the natural history of breast cancer, the different types of cancer and how breast cancers typically progress.  I told the patient that breast cancers typically spread to axillary lymph nodes and then to distant sites.  I told her that important prognostic factors in breast cancer are the size of the tumor, status of the axillary nodes, and receptor status.  I explained that most breast cancers are surgically removed first and then given adjuvant radiation and/or chemohormonal therapy. We discussed that I would like to get MRI for this patient in order to evaluate the extent of disease of lobular cancer as well as investigate how close the lesion is to the base of the nipple. The patient is also going to meet with genetics before making surgical decisions. Her sister is CHEK2 positive.    Surgical treatment options were discussed in  detail with the patient. These include breast conservation or mastectomy. We discussed that there are randomized clinical trials that have conclusively established the equivalency of survival between these two approaches. She understands the importance of histologic margin analysis to determine the feasibility of a breast conserving approach and the potential for either re-excision or a completion mastectomy depending upon the extent of any residual microscopic disease. As an alternative, I have offered the option of a mastectomy which may not require postoperative radiation therapy but would limit the potential for additional surgical procedures. We discussed, in brief, reconstruction options associated with mastectomy. I have emphasized to her that the two treatment approaches have an equivalent potential for cure and similar recurrence rates. I told her that I thought she was a good candidate for lumpectomy as long as the size on MRI is not too different from the imaging we have so far. She would need a preoperative seed localization procedure. She understands that if she undergoes lumpectomy, she might require radiation. We discussed radiation therapy and that it entails several weeks of daily radiation. She will meet with a radiation oncologist after surgery. Side effects we discussed include skin reaction and fatigue. Based on her age and having an early stage hormone receptor positive tumor and a clinically negative axilla, we discussed that there is no significant survival benefit to adjuvant radiation and that it might not be offered to her.    We discussed that the surgery would be performed as an outpatient. We discussed the low risk of infection and bleeding. This patient does have significant medical history to increase these risks - history of smoking increases risks of wound complications. She is aware of the risk to have to return to the operating room for hematoma or positive margins. She is aware  of postoperative seroma risk.     I also recommended a sentinel lymph node biopsy. I explained the procedure of sentinel lymph node biopsy, the risks and the benefits, and the need for preoperative lymphoscintigraphy in the nuclear medicine department prior to surgery to locate the node. Blue dye will be injected in the operating room while the patient is asleep to help locate the node.     We discussed the two main types of systemic adjuvant treatment: chemotherapy and hormonal therapy. I told her that she would need to see a medical oncologist to make the final decisions about adjuvant chemotherapy or hormonal therapy and that we would set her up to see a medical oncologist soon after surgery. She is not likely to require chemotherapy but the patient understands that the decision will be made based on final pathology. Because the tumor is ER positive, she will be offered anti-estrogen therapy. We discussed this is a daily pill that you take for 5 years to decrease rate of recurrence.    Her case was discussed in multidisciplinary tumor board on 7/9. 1. Additional work up needed:   A) Genetics: sister reportedly positive for CHEK2, patient to meet genetics today   B) Need for MRI: Yes for lobular carcinoma and delineation of nipple margin, ordered    2. Chemotherapy:   Based on final pathology    3. Surgery:   A) Lymph nodes: right axillary sentinel lymph node biopsy  B) Breast: right breast lumpectomy is preferred by the patient   C) Reconstruction by plastic surgery: not at this time    4. Radiation:   Likely if patient undergoes lumpectomy, but will leave up to radiation oncologist    5. Anti-estrogen therapy: yes due to ER positive        She was given ample opportunity for questions and those questions were answered to her satisfaction. She has been encouraged to contact the office with any questions or concerns prior to her next appointment. This encounter lasted a total of 60 minutes, more than 50% of  which was dedicated to the discussion of management options.     Reyes Queen MD  Breast Surgical Oncology    CC: Dr. Johnson

## 2024-07-09 ENCOUNTER — NURSE ONLY (OUTPATIENT)
Dept: HEMATOLOGY/ONCOLOGY | Facility: HOSPITAL | Age: 74
End: 2024-07-09
Attending: GENETIC COUNSELOR, MS
Payer: MEDICARE

## 2024-07-09 ENCOUNTER — NURSE NAVIGATOR ENCOUNTER (OUTPATIENT)
Dept: HEMATOLOGY/ONCOLOGY | Facility: HOSPITAL | Age: 74
End: 2024-07-09

## 2024-07-09 ENCOUNTER — GENETICS ENCOUNTER (OUTPATIENT)
Dept: GENETICS | Facility: HOSPITAL | Age: 74
End: 2024-07-09
Attending: GENETIC COUNSELOR, MS
Payer: MEDICARE

## 2024-07-09 ENCOUNTER — OFFICE VISIT (OUTPATIENT)
Dept: SURGERY | Facility: CLINIC | Age: 74
End: 2024-07-09
Payer: MEDICARE

## 2024-07-09 VITALS
RESPIRATION RATE: 16 BRPM | BODY MASS INDEX: 24 KG/M2 | WEIGHT: 135 LBS | DIASTOLIC BLOOD PRESSURE: 78 MMHG | SYSTOLIC BLOOD PRESSURE: 133 MMHG | TEMPERATURE: 97 F | HEART RATE: 81 BPM | OXYGEN SATURATION: 94 %

## 2024-07-09 DIAGNOSIS — C50.911 MALIGNANT NEOPLASM OF RIGHT BREAST IN FEMALE, ESTROGEN RECEPTOR POSITIVE, UNSPECIFIED SITE OF BREAST (HCC): Primary | ICD-10-CM

## 2024-07-09 DIAGNOSIS — Z17.0 MALIGNANT NEOPLASM OF RIGHT BREAST IN FEMALE, ESTROGEN RECEPTOR POSITIVE, UNSPECIFIED SITE OF BREAST (HCC): Primary | ICD-10-CM

## 2024-07-09 DIAGNOSIS — C50.919 INVASIVE LOBULAR CARCINOMA OF BREAST IN FEMALE (HCC): Primary | ICD-10-CM

## 2024-07-09 PROCEDURE — 1159F MED LIST DOCD IN RCRD: CPT | Performed by: SURGERY

## 2024-07-09 PROCEDURE — 1160F RVW MEDS BY RX/DR IN RCRD: CPT | Performed by: SURGERY

## 2024-07-09 PROCEDURE — 99205 OFFICE O/P NEW HI 60 MIN: CPT | Performed by: SURGERY

## 2024-07-09 PROCEDURE — 3078F DIAST BP <80 MM HG: CPT | Performed by: SURGERY

## 2024-07-09 PROCEDURE — 36415 COLL VENOUS BLD VENIPUNCTURE: CPT

## 2024-07-09 PROCEDURE — 3075F SYST BP GE 130 - 139MM HG: CPT | Performed by: SURGERY

## 2024-07-09 PROCEDURE — 96040 HC GENETIC COUNSELING EA 30 MIN: CPT | Performed by: GENETIC COUNSELOR, MS

## 2024-07-09 NOTE — PROGRESS NOTES
Met with patient in Dr. Queen's clinic. Introduced myself as one the breast nurse navigators and explained the role of the breast nurse navigator and coordination of care. Explained the role of all of the physicians involved in her care including the surgeon, medical oncologist, radiation oncologist, and possibly plastic surgeon. Reviewed over the patients pathology report and discussed receptors including ER/ID/ and Her 2. Patient was given the breast cancer treatment handbook and reviewed over how to use this resource. Discussed the breast multidisciplinary conference and that her case was discussed. Patient was given the contact information for the social workers at the [facility] and resources for support as requested. Breast cancer journey map provided to patient and discussed possible Oncotype, if applicable, and other cancer treatments such as chemotherapy and radiation. Provided lumpectomy surgical sheet. Next step in care will be to schedule breast MRI.     Pt was provided with breast nurse navigator contact information and was encouraged to phone with any other questions or concerns.

## 2024-07-10 ENCOUNTER — TELEPHONE (OUTPATIENT)
Dept: HEMATOLOGY/ONCOLOGY | Facility: HOSPITAL | Age: 74
End: 2024-07-10

## 2024-07-10 NOTE — TELEPHONE ENCOUNTER
Received VM from patient stating that she had a \"problem\" that she would like to discuss with the breast RN navigators. Called patient back in regards to her voicemail. Discussed with the patient that she would like to consult with Dr. Yu as her breast surgeon. I stated I would need to discuss the matter with the team and contact her back. Called patient back and scheduled patient with Dr. Yu on July 30, 2024 at 0900 in [place]. Patient thanked me for the phone call back and assistance.

## 2024-07-12 NOTE — PROGRESS NOTES
Referring Provider:    Mallory Johnson DO/Mindy Noonan PA-C     Additional Provider: Reyes Queen MD    Reason for Referral:  Sophia Rendon was referred for genetic counseling because she has a personal and family history of breast cancer. Additionally, Ms. Rendon's sister reportedly tested positive for a CHEK2 pathogenic variant. Ms. Rendon's sister's genetic test report was not available at the time of Ms. Rendon's session.    Ms. Rendon is a nulliparous 73 year-old woman of [place], [place], and [place] descent who was diagnosed with an ER-positive, NE-negative, HER2-negative invasive lobular carcinoma of the right breast on 06/24/24. Ms. Rendon reportedly had a total hysterectomy with bilateral oophorectomy in 2012. She has not had a colonoscopy but is reportedly up-to-date with Cologuard screening. Ms. Rendon reports she had a couple of skin cancers removed, not otherwise specified.     Social History:  Ms. Rendon was seen today by herself. Ms. Rendon lives in [place].     Family History:  A three generation pedigree was obtained.     Ms. Rendon  has 1 brother, 75, and 1 sister who was diagnosed with breast cancer at 65/66. This sister is still alive, and had genetic testing which reportedly identified a CHEK2 pathogenic/likely pathogenic variant. Ms. Rendon's sister has no children by choice. Ms. Rendon's brother has 1 son, 37, and 1 daughter, late 20's. Ms. Rendon's brother and his kids are alive and well.     Ms. Rendon's mother passed at 83. Ms. Rendon reports her mother had an unknown cancerous mass in her chest area discovered in her final weeks of life. Ms. Rendon's mother has a history of 5 miscarriages. Ms. Rendon's mother had 3 full brothers, all passing young due to non-natural causes including accidents, violence, and suicide. Ms. Rendon has little contact with this side of the family. Ms. Zendejass mother had 1 half-brother and 1 half-sister through Ms. Rendon's  maternal grandmother who have since passed. Ms. Rendon's maternal grandmother passed from lung cancer in her 80's and was a smoker. Ms. Rendon's maternal grandfather passed in his late 50's/early 60's due to a stroke.      Ms. Rendon's father passed at 45 due to complications of Cushing's disease and pneumonia. Ms. Rendon does not have any information about her father's relatives.    Please see the pedigree for additional family history information.    Counseling:  The following information was discussed with Ms. Rendon.     Genetics of Breast Cancer:  In the [place], approximately 1 in 8 women will develop breast cancer. Although the majority of breast cancer cases are sporadic, approximately 5-10% of women with breast cancer have a hereditary cancer syndrome.  Signs of a hereditary cancer syndrome include some rare cancers, common cancers occurring at unusually young ages, multiple primary cancers in the same individual, or the same type of cancer or related cancers (e.g., breast and ovarian, colorectal and endometrial) in three or more individuals in the same lineage.  Mutations in the genes, BRCA1 and BRCA2, account for the majority of hereditary breast and ovarian cancer families.  Mutations in genes other than BRCA1/2, many of which now have medical management recommendations (e.g., LASHA, CHEK2, PALB2) are identified in 3-10% of individuals tested using a multigene panel.      Risk Assessment:  Ms. Rendon meet NCCN Guidelines for testing criteria because she has a 50% chance to tested positive for the CHEK2 mutation discovered in her sister.  Per NCCN CRIT1-A, individuals with any blood relative with a known P/LP variant in a cancer susceptibility gene are eligible for testing. National Comprehensive Cancer Network® (NCCN®) Version 3.2024, 02/12/24.    Genetic Testing (Panel):  The pros, cons, and limitations of genetic testing were discussed including the potential implications of test  results on clinical management.    If a pathogenic variant is not identified (negative result), it is still possible that Ms. Rendon has a pathogenic variant in one of these genes that was not detected by the genetic test, or that the family is dealing with a hereditary cancer syndrome involving a different gene. It is also possible that Ms. Rendon's relatives have a pathogenic variant in one of these genes that Ms. Rendon did not inherit. In this scenario, options for cancer screening/management should be determined according to personal and family histories and should be discussed with a physician.     A variant of uncertain significance is a DNA change that may or may not alter the function of the gene; therefore, it is usually not possible to determine if the gene variant is responsible for an individual's increased cancer risk.    If Ms. Rendon is found to carry a pathogenic variant in a cancer predisposition gene, she is at significantly increased risk for various cancers. The magnitude of these risks, and the cancers for which she is at increased risk would depend on the gene involved. Medical recommendations for individuals with BRCA1/2  and CHEK2 pathogenic variants were reviewed as examples. It was also explained that for some of the genes for which testing is available, the associated cancer risks have yet to be determined and medical management recommendations may not yet be available for individuals with pathogenic variants in these genes. If she were to test positive for a pathogenic variant, her siblings would each have a 50% chance of carrying the same variant. At-risk adults (>18) would have the option of pursuing targeted genetic testing to clarify their cancer risks. Genetic test results have implications for the entire biological family. Thus, it is recommended that she share her genetic test results with her biological family members so that they may have their risk assessed.    Genetic  Information Non-Discrimination Act:  The legal protections of the Genetic Information Nondiscrimination Act (RELL) for health insurance and employment were discussed.  RELL does not provide protection for life insurance, disability or long-term care insurance.    Summary and Plan:  Ms. Rendon was referred for genetic counseling because her sister has a pathogenic/likely pathogenic CHEK2 mutation. Genetic testing on Ms. Rendon for breast cancer susceptibility genes is recommended.     At the conclusion of the counseling session Ms. Rendon decided to proceed with genetic testing. Written consent was obtained. Blood and paperwork were sent to [facility] for their Breast Cancer STAT panel (LASHA, BRCA1, BRCA2, CDH1, CHEK2, PALB2, PTEN, STK11, TP53) with reflex to [facility]'s 48 gene common hereditary cancers panel. I anticipate that Ms. Rendon's Breast Cancer STAT results will be available within 6-13 days and will call her with the results.  Results will also be communicated to Dr. Sy, Dr Queen, and Ms. Noonan.    Approximately 40 minutes was spent in consultation with Ms. Rendon.     Addendum (07/11/24):  Ms. Rendon left me a voicemail that her sister's CHEK2 pathogenic variant is c.1100del.

## 2024-07-15 ENCOUNTER — HOSPITAL ENCOUNTER (OUTPATIENT)
Dept: MRI IMAGING | Facility: HOSPITAL | Age: 74
Discharge: HOME | End: 2024-07-15
Attending: SURGERY
Payer: MEDICARE

## 2024-07-15 DIAGNOSIS — C50.919 INVASIVE LOBULAR CARCINOMA OF BREAST IN FEMALE (HCC): ICD-10-CM

## 2024-07-15 PROCEDURE — 77049 MRI BREAST C-+ W/CAD BI: CPT | Performed by: SURGERY

## 2024-07-15 PROCEDURE — A9575 INJ GADOTERATE MEGLUMI 0.1ML: HCPCS | Performed by: SURGERY

## 2024-07-15 RX ORDER — GADOTERATE MEGLUMINE 376.9 MG/ML
15 INJECTION INTRAVENOUS
Status: COMPLETED | OUTPATIENT
Start: 2024-07-15 | End: 2024-07-15

## 2024-07-15 RX ADMIN — GADOTERATE MEGLUMINE 13 ML: 376.9 INJECTION INTRAVENOUS at 21:16:00

## 2024-07-17 ENCOUNTER — TELEPHONE (OUTPATIENT)
Dept: HEMATOLOGY/ONCOLOGY | Facility: HOSPITAL | Age: 74
End: 2024-07-17

## 2024-07-17 NOTE — TELEPHONE ENCOUNTER
Patient called back and I verified her full name and  and provided the findings from her breast MRI results. Answered patient's questions to the best of my ability. She is scheduled with Dr. Yu on 2024 to discuss surgical options. She thanked me for the assistance.

## 2024-07-19 ENCOUNTER — GENETICS ENCOUNTER (OUTPATIENT)
Dept: HEMATOLOGY/ONCOLOGY | Facility: HOSPITAL | Age: 74
End: 2024-07-19

## 2024-07-19 NOTE — PROGRESS NOTES
Referring Provider:          Mallory Johnson DO/Mindy Noonan PA-C      Additional Provider:     Reyes Queen MD    Reason for Referral:  Sophia Rendon had genetic testing performed on 07/09/24 because of a new diagnosis of breast cancer at age 73 and a family history of breast cancer in her sister.    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 No other known pathogenic variants were found in 8 additional genes including: LASHA, BRCA1, BRCA2, CDH1, PALB2, PTEN, STK11, TP53.  Please refer to the report from [facility] ([id number]) for additional testing information. These results were discussed with Ms. Rendon by phone on 07/18/24.       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 Ms. Rendon is 73 years-old and was recently diagnosed with invasive lobular carcinoma of the right breast. Ms. Rendon has not had a screening colonoscopy.      The absolute risk for breast cancer associated with CHEK2 pathogenic variants is estimated to be 20%-40% with the risk being higher for frameshift pathogenic variants (including CHEK2 c.1100del) than for missense pathogenic variants. The 10-year cumulative risk for contralateral breast cancer is currently listed as 6-8% by current NCCN Guidelines (v3.2024). Current NCCN Guidelines recommend annual mammogram at age 40 and consider annual breast MRI screening starting at age 30-35 y. NCCN Guidelines note that evidence is insufficient for risk-reducing mastectomy and that women should be managed based on family history.      The absolute risk for colorectal cancer associated with CHEK2 pathogenic variants is estimated to be 5-10%. NCCN Guidelines for Genetic/Familial High-Risk Assessment: Colorectal (v2.2023) include the recommendation for CHEK2 carriers unaffected by colorectal cancer and no first-degree relative with colorectal cancer to have a screening colonoscopy every 5  years, beginning at age 40 or 10 y prior to age of first-degree relative's CRC diagnosis when indicated. I am not aware of any evidence-based guidelines that address surveillance for cancers other than breast and colorectal cancers in CHEK2 pathogenic variants. Screening for other cancers (e.g., renal, thyroid) should be based on family history. Full NCCN Guidelines can be found at www.nccn.org.  All medical management decisions should be discussed with a physician.     Ms. Rendon should follow-up with her medical providers to review their recommendations for follow-up based on her personal medical history including her genetic test results.     The implications of these results for Ms. Rendon's relatives were briefly discussed. Ms. Rendon reports that her sister, who had breast cancer in her mid-60s, carries the same CHEK2 pathogenic variant as the one identified in Ms. Rendon. Ms. Rendon's brother has a 50% chance to carry the same pathogenic variant. I encourage Ms. Rendon to share her genetic test results with her brother and other adult maternal and paternal relatives so that they may have their cancer risks assessed by a physician and/or genetic counselor. I will send her a family letter to share with her relatives to help facilitate this communication.      In the meantime, Ms. Rendon was encouraged to call me with additional questions regarding these results.      Cc:  Sophia Laureanopurvimone

## 2024-07-25 ENCOUNTER — TELEPHONE (OUTPATIENT)
Dept: HEMATOLOGY/ONCOLOGY | Facility: HOSPITAL | Age: 74
End: 2024-07-25

## 2024-07-25 NOTE — TELEPHONE ENCOUNTER
Patient called with questions about recent letter she received in the mail regarding a test that was approved.  I let her know that I did not know what test this was in reference to and suggested she call her insurance company for clarification.  She also said that on August 27th she has a Heart PET scan scheduled with [facility] to monitor her blood flow.  She is worried about this date because this will be needed to get cleared for surgery.  She will discuss this with Dr. Yu during her consultation.

## 2024-07-30 ENCOUNTER — OFFICE VISIT (OUTPATIENT)
Dept: SURGERY | Facility: CLINIC | Age: 74
End: 2024-07-30
Payer: MEDICARE

## 2024-07-30 ENCOUNTER — TELEPHONE (OUTPATIENT)
Dept: SURGERY | Facility: CLINIC | Age: 74
End: 2024-07-30

## 2024-07-30 VITALS
WEIGHT: 134 LBS | RESPIRATION RATE: 18 BRPM | SYSTOLIC BLOOD PRESSURE: 131 MMHG | HEIGHT: 63.5 IN | BODY MASS INDEX: 23.45 KG/M2 | OXYGEN SATURATION: 97 % | DIASTOLIC BLOOD PRESSURE: 64 MMHG | HEART RATE: 70 BPM | TEMPERATURE: 98 F

## 2024-07-30 DIAGNOSIS — C50.919 INVASIVE LOBULAR CARCINOMA OF BREAST IN FEMALE (HCC): Primary | ICD-10-CM

## 2024-07-30 PROCEDURE — 99215 OFFICE O/P EST HI 40 MIN: CPT | Performed by: SURGERY

## 2024-07-30 PROCEDURE — 1159F MED LIST DOCD IN RCRD: CPT | Performed by: SURGERY

## 2024-07-30 PROCEDURE — 3078F DIAST BP <80 MM HG: CPT | Performed by: SURGERY

## 2024-07-30 PROCEDURE — 3075F SYST BP GE 130 - 139MM HG: CPT | Performed by: SURGERY

## 2024-07-30 PROCEDURE — 1160F RVW MEDS BY RX/DR IN RCRD: CPT | Performed by: SURGERY

## 2024-07-30 PROCEDURE — 3008F BODY MASS INDEX DOCD: CPT | Performed by: SURGERY

## 2024-07-30 PROCEDURE — 1125F AMNT PAIN NOTED PAIN PRSNT: CPT | Performed by: SURGERY

## 2024-07-30 NOTE — TELEPHONE ENCOUNTER
Calling pt in regards to scheduling surgery.  Informed pt that I have 08/16/2024 available at [facility] with Dr. Yu.  Pt verbalized understanding and in agreement with date and location.  All questions answered.   Encouraged pt to call or [facility] message office with any other questions or concerns.

## 2024-07-30 NOTE — TELEPHONE ENCOUNTER
Called patient to schedule surgery with Dr. Yu patient informed me she needs to do some medical clearances before scheduling her surgery told patient to call us when ready to schedule

## 2024-07-30 NOTE — PROGRESS NOTES
Breast Surgery New Patient Consultation    This is the first visit for this 73 year old woman, referred by Dr. Johnson, who presents for evaluation of R breast cancer.    History of Present Illness:   Ms. Sophia Rendon is a 73 year old woman who presents with discomfort and a lump that recently developed in her right breast.  She does have a family history of breast cancer and her sister tested positive for a CHEK2 genetic mutation.  She has a personal history of bilateral excision of fibroadenomas.  She denies any nipple discharge, skin changes or axillary concerns.  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  She had a bilateral diagnostic evaluation given her symptoms on June 24, 2024 that confirmed a 1 cm mass at her site of palpable concern in the 9:00 retroareolar area for which biopsy was recommended.  She was noted to have normal-appearing axillary lymph nodes on ultrasound.  She had her biopsy on June 27, 2024 and was found to have invasive lobular carcinoma, grade 1 measuring up to 3 mm that was ER greater than 95%, ND negative, HER2/gisela negative with a Ki-67 of 1%.  She has furthermore had an MRI to delineate extent of disease which took place on July 15, 2024 and confirmed a 2 x 2.1 x 1.7 cm enhancement extending to the nipple.  She is here today for evaluation and recommendations for further therapy.        Past Medical History:    Colon cancer screening    COPD (chronic obstructive pulmonary disease)    Depression    Diverticulitis    Fall    Goiter    Graves disease    Heart attack (HCC)    4 stents    Heart disease    History of appendectomy    History of hysterectomy    Hypothyroid    Migraines    Ovarian mass    Pneumonia    Pneumonia, organism unspecified(486)    Post PTCA    Post traumatic stress disorder (PTSD)    Tobacco abuse    Unspecified essential hypertension       Past Surgical History:   Procedure Laterality Date    Angioplasty  (coronary)      With STENT    Appendectomy      Appendectomy,w othr proc      right OOPHORECTOMY    Hysterectomy      total hystero.    Susei localization wire 1 site left (cpt=19281) Left     benign.    Susie localization wire 1 site right (cpt=19281) Right     benign.    Oophorectomy Bilateral     total hystero.    Other surgical history      Fibroadenoma    Other surgical history      Ruptured cyst- right ovary    Tonsillectomy         Gynecological History:  Pt is a   She achieved menarche at age 12   Age of Menopause: 48  Type: Medication induced  She denies any history of hormone replacement therapy   She has history of oral contraceptive use for 3 years, last in .  She denies infertility treatment to achieve pregnancy.    Medications:    No outpatient medications have been marked as taking for the 24 encounter (Appointment) with Liliana Yu MD.       Allergies:    Allergies   Allergen Reactions    Epinephrine OTHER (SEE COMMENTS)     Injection    Penicillin G CARDIAC ARREST    Sulfa Antibiotics CARDIAC ARREST    Berries UNKNOWN    Chicken Allergy      And ALL FOWL type bird    Chocolate     Codeine     Dairy Products     Eggs Or Egg-Derived Products     Grass      trees    Molds & Smuts     Mushrooms     Soy [Isoflavones, Soy]        Family History:   Family History   Problem Relation Age of Onset    Psychiatric Mother         Severe Depression, anxiety    Cancer Mother         Heart    Other (Other) Mother         Hypothyroidism    Endocrine Disorder Father         cushing's disease    Other (Other) Father     Other (CHEK-2+) Sister     Breast Cancer Sister 65    Other (Other) Other         stroke, a-fib    Migraines Other        She is not of Ashkenazi Sabianist ancestry.    Social History:  History   Alcohol Use    Yes     Comment: minimal       History   Smoking Status    Every Day    Types: Cigarettes   Smokeless Tobacco    Never   Ms. Sophia Rendon is  with 0  children. She has 2 siblings. She is currently Retired    Review of Systems:  General:   The patient denies, fever, chills, night sweats, fatigue, generalized weakness, change in appetite or weight loss.    HEENT:     The patient denies eye irritation, +cataracts, redness, glaucoma, yellowing of the eyes, change in vision, color blindness, or +wearing contacts/glasses,+macular degeneration. The patient denies hearing loss, ringing in the ears, ear drainage, earaches, nasal congestion, nose bleeds, snoring, pain in mouth/throat, +hoarseness, change in voice, facial trauma.    Respiratory:  The patient denies chronic +cough, +phlegm, hemoptysis, pleurisy/chest pain, pneumonia, asthma, wheezing, difficulty in breathing with exertion, +emphysema, chronic bronchitis, shortness of breath or abnormal sound when breathing.     Cardiovascular:  There is no history of chest pain, chest pressure/discomfort, palpitations, irregular heartbeat, +fainting or near-fainting, difficulty breathing when lying flat, SOB/Coughing at night, swelling of the legs or chest pain while walking.    Breasts:  See history of present illness    Gastrointestinal:     There is no history of difficulty or pain with swallowing, reflux symptoms, vomiting, dark or bloody stools, constipation, yellowing of the skin, indigestion, nausea, change in bowel habits, diarrhea, abdominal pain or vomiting blood.     Genitourinary:  The patient denies frequent urination, +needing to get up at night to urinate, urinary hesitancy or retaining urine, painful urination, urinary incontinence, decreased urine stream, blood in the urine or vaginal/penile discharge.    Skin:    The patient denies rash, itching, skin lesions, +dry skin, change in skin color or change in moles.     Hematologic/Lymphatic:  The patient denies easily bruising or bleeding or persistent swollen glands or lymph nodes.     Musculoskeletal:  The patient denies muscle aches/pain, joint pain, stiff  joints, neck pain, back pain or bone pain.    Neuropsychiatric:  There is no history of +migraines or severe headaches, seizure/epilepsy, +speech problems, coordination problems, +trembling/tremors (PTSD), fainting/black outs, dizziness, memory problems, loss of sensation/numbness, +problems walking, weakness, tingling or burning in hands/feet. There is no history of +abusive relationship(Past), bipolar disorder, +sleep disturbance, +anxiety, +depression or +feeling of despair.    Endocrine:    There is no history of poor/slow wound healing, weight loss/gain, fertility or hormone problems, cold intolerance, +thyroid disease.     Allergic/Immunologic:  There is no history of hives, hay fever, angioedema or anaphylaxis.    /64 (BP Location: Right arm, Patient Position: Sitting, Cuff Size: adult)   Pulse 70   Temp 97.6 °F (36.4 °C) (Temporal)   Resp 18   Ht 1.613 m (5' 3.5\")   Wt 60.8 kg (134 lb)   SpO2 97%   BMI 23.36 kg/m²     Physical Exam:  The patient is an alert, oriented, well-nourished and  well-developed woman who appears her stated age. Her speech patterns and movements are normal. Her affect is appropriate.    HEENT: The head is normocephalic. The neck is supple. The thyroid is not enlarged and is without palpable masses/nodules. There are no palpable masses. The trachea is in the midline. Conjunctiva are clear, non-icteric.    Chest: The chest expands symmetrically. The lungs are clear to auscultation.    Heart: The rhythm is regular.  There are no murmurs, rubs, gallops or thrills.    Breasts:  Her breasts are symmetrical with a cup size 36DD.  Right breast: The skin, nipple ,and areola appear normal. There is no skin dimpling with movement of the pectoralis. There is no nipple retraction. No nipple discharge can be elicited. The parenchyma is mildly nodular. There is palpable swelling with biopsy site changes in the right breast    Retroareolar area. The axillary tail is normal.  Left breast:    The skin, nipple, and areola appear normal. There is no skin dimpling with movement of the pectoralis. There is no nipple retraction. No nipple discharge can be elicited. The parenchyma is mildly nodular. There are no dominant masses in the breast. The axillary tail is normal.    Abdomen:  The abdomen is soft, flat and non tender. The liver is not enlarged. There are no palpable masses.    Lymph Nodes:  The supraclavicular, axillary and cervical regions are free of significant lymphadenopathy.    Back: There is no vertebral column tenderness.    Skin: The skin appears normal. There are no suspicious appearing rashes or lesions.    Extremities: The extremities are without deformity, cyanosis or edema.    Impression:   Ms. Sophia Rendon is a 73 year old woman presents with right breast cancer, clinical stage T2 NX MX.    Discussion and Plan:  I had a discussion with the Patient regarding her breast exam. On exam today I found her to be healing well since recent biopsy with no other clinical findings.  I personally reviewed the recent imaging and pathology we discussed this at length.    The natural history and evolution of breast cancer were discussed with Ms. Sophia Rendon and her  family, including the difference between in-situ and invasive carcinoma, and the distinction  between local and systemic disease and local and systemic therapy. For local treatment options,  I explained the risks and benefits of breast conservation and mastectomy (with or without  reconstruction), including the fact that survival rates are equal with these two approaches.  If breast conservation is elected, I explained the need for free margins, the possibility of re-  excision to achieve free margins, and the need for post-operative radiotherapy. The approach  to mary ann staging was also described, including the technique, risks and benefits of sentinel node  biopsy, the possible need for axillary dissection, and the long-term  sequelae of this procedure.  With regard to systemic therapy, final recommendation will be made following receipt of final  pathology post-op, but I outlined the possibility of endocrine therapy, chemotherapy, and  herceptin, depending on tumor marker profile.  Following this discussion, where all of the patient's questions were answered, we agreed to  proceed with breast conservation due to her comorbidities, despite her CHEK2 mutation she is not motivated for mastectomy at this time.  She does require cardiac clearance given her comorbidities.  She is motivated for right breast wire localized central lumpectomy with right sentinel lymph node biopsy given the intimate relationship of the tumor with her nipple on her MRI. The risks and possible complications of the procedure were explained to the patient and her family and she understood and agreed to the proposed plan. She was given ample opportunity for questions and those questions were answered to her satisfaction. She has been  encouraged to contact the office with any questions or concerns prior to her next appointment.     This note was created by [employer] voice recognition. Errors in content may be related to improper recognition by the system; efforts to review and correct have been done but errors may still exist. Please be advised the primary purpose of this note is for me to communicate medical care. Standard sentence structure is not always used. Medical terminology and medical abbreviations may be used. There may be grammatical, typographical, and automated fill ins that may have errors missed in proofreading.

## 2024-07-30 NOTE — PATIENT INSTRUCTIONS
Dr. Liliana Yu  Tel: [phone]  Fax: [phone] [address] [address]  [phone]     Surgery/Procedure: Right breast wire localized central lumpectomy,right breast lymphoscintigraphy, right breast sentinel lymph node biopsy      Anesthesia:   Gen  Surgery Length:   1.5 hours CPT:  39513,38273,68447   Wire LOC:   Yes Nuc Med:   Yes Rubi Seed:  No       Dx & ICD-10: Invasive lobular carcinoma of breast in female (HCC) (C50.919)   Radiology Instructions: Right breast, 9 o' clock retro areolar position, butterfly shaped clip, biopsy confirms Invasive Lobular carcinoma   _______________________________________________________________________________    Someone must accompany you the day of the procedure to drive you home safely, because of anesthesia.  You will need an adult  to stay with you the first night following your surgery.  You must remove any kind of makeup, acrylic nails, lotions, powders, creams or deodorant.  [facility] ONLY: Pre-admission will give instruct you on when to take Gatorade and Tylenol/acetaminophen prior to your surgery, purchase 2 - 12oz bottles of regular Gatorade (NOT RED/SUGAR FREE). Otherwise, you may not eat or drink anything else after 11PM the night before surgery.    [facility] ONLY: You may not eat or drink anything after midnight the day of your surgery.   Wear comfortable clothing that can be easily removed.  If you wear dentures, contacts lenses, or any prosthesis, you will be asked to remove them.  Do not drink alcohol or smoke 24 hours prior to your procedure.  Bring a picture ID and your insurance card.  Covid-19 testing is no longer required before surgery unless you are experiencing symptoms such as fever, cough, congestion, etc.   The Pre-Admission Testing Department will call the day before to confirm your procedure, give you the time you need to arrive by and directions on where to go. They begin making calls after  2pm, if you are not contacted by 4pm, please call the surgeon's office listed above.  Do not take any blood thinners at least one week prior to the procedure/surgery. This includes aspirin, baby aspirin, Ibuprofen products, herbal supplements, diet medications, vitamin E, fish oil and green tea supplements. Please check other supplements for these ingredients. *TYLENOL or acetaminophen is acceptable*  If you take Coumadin, Plavix, Xarelto, or Eliquis, please contact your prescribing physician for special instructions on how long to hold. If you take insulin contact your primary care physician for special instructions.  Our surgery scheduler, Natalia, will be contacting you to discuss surgery dates. If you have any questions related to scheduling your surgery, please reach out to her at [phone].  _____________________________________________________________________  PRE-OPERATIVE TESTING IF INDICATED BELOW  PLEASE COMPLETE ASAP (AT LEAST 14-21 DAYS PRIOR TO SURGERY)  [] CBC [x] BMP [] CMP [x] EKG    [] PT, PTT, INR [x] Cardiac Clearance  [x] H&P Medical Clearance [] Chest X-ray     Please call Central Scheduling to schedule an appointment for pre-operative labs/tests @ [phone]  Does the patient have a pacemaker or ICD?           Does the patient have sleep apnea?  [] Yes   [x] No                               [] Yes   [x] No

## 2024-07-31 PROBLEM — C50.919 INVASIVE LOBULAR CARCINOMA OF BREAST IN FEMALE (HCC): Status: ACTIVE | Noted: 2024-07-31

## 2024-08-02 ENCOUNTER — TELEPHONE (OUTPATIENT)
Dept: GENERAL RADIOLOGY | Facility: HOSPITAL | Age: 74
End: 2024-08-02

## 2024-08-02 ENCOUNTER — TELEPHONE (OUTPATIENT)
Dept: SURGERY | Facility: CLINIC | Age: 74
End: 2024-08-02

## 2024-08-02 RX ORDER — MULTIVIT-MIN/IRON FUM/FOLIC AC 7.5 MG-4
1 TABLET ORAL DAILY
COMMUNITY

## 2024-08-02 NOTE — TELEPHONE ENCOUNTER
1020: Spoke with Sophia Rendon.  Introduced myself as one of the breast nurse coordinators at [facility] and informed  of the purpose of my call.  Discussed sentinel lymph node mapping procedure to be done in the nuclear medicine department and localization procedure to be done in the women's imaging center prior to surgery Friday, August 16..   Both procedures and flow of the day reviewed. All questions answered to Ms. Rendon's satisfaction. Sophia Rendon verbalized understanding.   Encouraged Ms. Rendon to contact the breast center or Dr. uY's office if she has questions or concerns prior to her surgery date.  Breast center phone number provided.  Sophia Rendon verbalized agreement and gratitude for the call.

## 2024-08-02 NOTE — TELEPHONE ENCOUNTER
Called patient to inform that as per Pre Admission testing it is ok for her to take a uber /cab home as long as she has somebody to ride with her and take he home, and is 18 or over . Patient was very appreciative of the call . Patient verbalized understanding. Will contact office if she has any further questions.

## 2024-08-08 ENCOUNTER — EKG ENCOUNTER (OUTPATIENT)
Dept: LAB | Age: 74
End: 2024-08-08
Attending: FAMILY MEDICINE
Payer: MEDICARE

## 2024-08-08 ENCOUNTER — LAB ENCOUNTER (OUTPATIENT)
Dept: LAB | Age: 74
End: 2024-08-08
Attending: FAMILY MEDICINE
Payer: MEDICARE

## 2024-08-08 ENCOUNTER — OFFICE VISIT (OUTPATIENT)
Dept: FAMILY MEDICINE CLINIC | Facility: CLINIC | Age: 74
End: 2024-08-08
Payer: MEDICARE

## 2024-08-08 VITALS
OXYGEN SATURATION: 97 % | DIASTOLIC BLOOD PRESSURE: 68 MMHG | RESPIRATION RATE: 18 BRPM | BODY MASS INDEX: 23.62 KG/M2 | SYSTOLIC BLOOD PRESSURE: 136 MMHG | WEIGHT: 135 LBS | HEART RATE: 78 BPM | HEIGHT: 63.5 IN

## 2024-08-08 DIAGNOSIS — G43.109 MIGRAINE WITH AURA AND WITHOUT STATUS MIGRAINOSUS, NOT INTRACTABLE: ICD-10-CM

## 2024-08-08 DIAGNOSIS — J43.2 CENTRILOBULAR EMPHYSEMA (HCC): ICD-10-CM

## 2024-08-08 DIAGNOSIS — I25.10 CORONARY ARTERY DISEASE INVOLVING NATIVE HEART, UNSPECIFIED VESSEL OR LESION TYPE, UNSPECIFIED WHETHER ANGINA PRESENT: ICD-10-CM

## 2024-08-08 DIAGNOSIS — E78.5 HYPERLIPIDEMIA, UNSPECIFIED HYPERLIPIDEMIA TYPE: ICD-10-CM

## 2024-08-08 DIAGNOSIS — C50.919 INVASIVE LOBULAR CARCINOMA OF BREAST IN FEMALE (HCC): ICD-10-CM

## 2024-08-08 DIAGNOSIS — Z72.0 TOBACCO ABUSE: ICD-10-CM

## 2024-08-08 DIAGNOSIS — I70.0 THORACIC AORTA ATHEROSCLEROSIS (HCC): ICD-10-CM

## 2024-08-08 DIAGNOSIS — F33.1 MODERATE RECURRENT MAJOR DEPRESSION (HCC): Chronic | ICD-10-CM

## 2024-08-08 DIAGNOSIS — E03.9 HYPOTHYROIDISM, UNSPECIFIED TYPE: ICD-10-CM

## 2024-08-08 DIAGNOSIS — F41.9 ANXIETY: ICD-10-CM

## 2024-08-08 DIAGNOSIS — Z01.818 PREOPERATIVE CLEARANCE: Primary | ICD-10-CM

## 2024-08-08 DIAGNOSIS — Z01.818 PREOPERATIVE CLEARANCE: ICD-10-CM

## 2024-08-08 LAB
ANION GAP SERPL CALC-SCNC: 1 MMOL/L (ref 0–18)
ATRIAL RATE: 61 BPM
BUN BLD-MCNC: 10 MG/DL (ref 9–23)
CALCIUM BLD-MCNC: 9.8 MG/DL (ref 8.7–10.4)
CHLORIDE SERPL-SCNC: 106 MMOL/L (ref 98–112)
CO2 SERPL-SCNC: 31 MMOL/L (ref 21–32)
CREAT BLD-MCNC: 0.72 MG/DL
EGFRCR SERPLBLD CKD-EPI 2021: 89 ML/MIN/1.73M2 (ref 60–?)
FASTING STATUS PATIENT QL REPORTED: YES
GLUCOSE BLD-MCNC: 90 MG/DL (ref 70–99)
OSMOLALITY SERPL CALC.SUM OF ELEC: 285 MOSM/KG (ref 275–295)
P AXIS: 54 DEGREES
P-R INTERVAL: 188 MS
POTASSIUM SERPL-SCNC: 4 MMOL/L (ref 3.5–5.1)
Q-T INTERVAL: 418 MS
QRS DURATION: 104 MS
QTC CALCULATION (BEZET): 420 MS
R AXIS: -64 DEGREES
SODIUM SERPL-SCNC: 138 MMOL/L (ref 136–145)
T AXIS: 11 DEGREES
VENTRICULAR RATE: 61 BPM

## 2024-08-08 PROCEDURE — 3078F DIAST BP <80 MM HG: CPT | Performed by: FAMILY MEDICINE

## 2024-08-08 PROCEDURE — 1160F RVW MEDS BY RX/DR IN RCRD: CPT | Performed by: FAMILY MEDICINE

## 2024-08-08 PROCEDURE — 99214 OFFICE O/P EST MOD 30 MIN: CPT | Performed by: FAMILY MEDICINE

## 2024-08-08 PROCEDURE — 93010 ELECTROCARDIOGRAM REPORT: CPT | Performed by: INTERNAL MEDICINE

## 2024-08-08 PROCEDURE — 3075F SYST BP GE 130 - 139MM HG: CPT | Performed by: FAMILY MEDICINE

## 2024-08-08 PROCEDURE — 93005 ELECTROCARDIOGRAM TRACING: CPT

## 2024-08-08 PROCEDURE — 80048 BASIC METABOLIC PNL TOTAL CA: CPT

## 2024-08-08 PROCEDURE — 3008F BODY MASS INDEX DOCD: CPT | Performed by: FAMILY MEDICINE

## 2024-08-08 PROCEDURE — 36415 COLL VENOUS BLD VENIPUNCTURE: CPT

## 2024-08-08 PROCEDURE — 1159F MED LIST DOCD IN RCRD: CPT | Performed by: FAMILY MEDICINE

## 2024-08-08 NOTE — PROGRESS NOTES
Sophia Rendon is a 73 year old female who presents for preop clearance for   Right breast wire localized central lumpectomy,right breast lymphoscintigraphy, right breast sentinel lymph node biopsy   Dr. Yu requesting clearance.  8/16/24   At [facility]   HPI:   Pt complains of breast mass.  Pt denies any chest pain/sob/dizziness or lightheadedness at rest or with exertion.  No previous reaction or problems with anesthesia.    Wt Readings from Last 6 Encounters:   08/08/24 135 lb (61.2 kg)   08/02/24 134 lb (60.8 kg)   07/30/24 134 lb (60.8 kg)   07/09/24 135 lb (61.2 kg)   06/14/24 132 lb (59.9 kg)   04/24/24 139 lb (63 kg)     Body mass index is 23.54 kg/m².     Cholesterol, Total (mg/dL)   Date Value   02/29/2024 149   08/18/2023 139   02/28/2023 139     CHOLESTEROL (mg/dL)   Date Value   04/12/2014 110   06/22/2013 114   03/23/2013 107     HDL Cholesterol (mg/dL)   Date Value   02/29/2024 51   08/18/2023 52   02/28/2023 57     HDL CHOL (mg/dL)   Date Value   04/12/2014 48   06/22/2013 52   03/23/2013 46     LDL Cholesterol (mg/dL)   Date Value   02/29/2024 71   08/18/2023 59   02/28/2023 59     LDL CHOLESTROL (mg/dL)   Date Value   04/12/2014 37   06/22/2013 36   03/23/2013 37     AST (U/L)   Date Value   04/24/2024 23   02/29/2024 16   08/18/2023 20   04/12/2014 14 (L)   06/22/2013 12 (L)   03/23/2013 12 (L)     ALT (U/L)   Date Value   04/24/2024 19   02/29/2024 17   08/18/2023 26   04/12/2014 19   06/22/2013 21   03/23/2013 15      Current Outpatient Medications   Medication Sig Dispense Refill    Multiple Vitamins-Minerals (PRESERVISION AREDS 2 OR) Take by mouth.      Multiple Vitamins-Minerals (MULTI-VITAMIN/MINERALS) Oral Tab Take 1 tablet by mouth daily.      fluticasone-umeclidin-vilant (TRELEGY ELLIPTA) 200-62.5-25 MCG/ACT Inhalation Aerosol Powder, Breath Activated Inhale 1 puff into the lungs daily. 3 each 0    Budesonide-Formoterol Fumarate (SYMBICORT) 160-4.5 MCG/ACT Inhalation Aerosol  Inhale 2 puffs into the lungs 2 (two) times daily. 3 each 1    levothyroxine (SYNTHROID) 88 MCG Oral Tab Take 1 tablet (88 mcg total) by mouth before breakfast. 90 tablet 3    Butalbital-APAP-Caffeine -40 MG Oral Tab Take 1 tablet by mouth every 6 (six) hours as needed for Pain. Take one to 2 tablets as needed 30 tablet 0    Vortioxetine HBr (TRINTELLIX OR) Take 2.5 mg by mouth every other day. 1.5mg every 4 days and 2.5mg every 4 days      nitroGLYCERIN (NITROSTAT) 0.4 MG Sublingual SL Tab Place 1 tablet (0.4 mg total) under the tongue every 5 (five) minutes as needed. 30 tablet 0    aspirin EC 81 MG Oral Tab EC Take 1 tablet (81 mg total) by mouth daily. 30 tablet 11    Metoprolol Succinate ER (TOPROL XL) 25 MG Oral Take 0.5 tablets (12.5 mg total) by mouth daily. Takes 1/2 tablet (12.5 mg) daily      simvastatin (ZOCOR) 10 MG Oral Tab Take 1 tablet (10 mg total) by mouth nightly.      alprazolam (XANAX) 0.5 MG Oral Tab Take 1 tablet (0.5 mg total) by mouth. Take one tablet night PRN up to 4 tablets daily        Past Medical History:    Anxiety state    Chronic PTSD    Breast cancer (HCC)    Cancer (HCC)    Cataract    Colon cancer screening    COPD (chronic obstructive pulmonary disease)    Depression    Disorder of thyroid    Diverticulitis    Fall    Goiter    Graves disease    Heart attack (HCC)    4 stents    Heart disease    History of appendectomy    History of hysterectomy    Hypothyroid    Migraines    Ovarian mass    Pneumonia    Pneumonia, organism unspecified(486)    Post PTCA    Post traumatic stress disorder (PTSD)    Pulmonary emphysema (HCC)    Shortness of breath    Tobacco abuse    Unspecified essential hypertension    Visual impairment    glasses      Past Surgical History:   Procedure Laterality Date    Angioplasty (coronary)      With STENT    Appendectomy      Appendectomy,w othr proc      right OOPHORECTOMY    Hysterectomy  2012    total hystero.    Susie localization wire 1 site left  (cpt=19281) Left 1980    benign.    Susie localization wire 1 site right (cpt=19281) Right 1990    benign.    Oophorectomy Bilateral 2012    total hystero.    Other surgical history      Fibroadenoma    Other surgical history      Ruptured cyst- right ovary    Tonsillectomy        Family History   Problem Relation Age of Onset    Psychiatric Mother         Severe Depression, anxiety    Cancer Mother         Heart    Other (Other) Mother         Hypothyroidism    Endocrine Disorder Father         cushing's disease    Other (Other) Father     Other (CHEK-2+) Sister     Breast Cancer Sister 65    Other (Other) Other         stroke, a-fib    Migraines Other       Social History:  Social History     Socioeconomic History    Marital status:    Occupational History    Occupation: [employer]   Tobacco Use    Smoking status: Every Day     Current packs/day: 1.00     Types: Cigarettes    Smokeless tobacco: Never   Vaping Use    Vaping status: Never Used   Substance and Sexual Activity    Alcohol use: Not Currently     Comment: minimal    Drug use: No   Other Topics Concern    Caffeine Concern No    Exercise Yes     Social Determinants of Health     Physical Activity: Inactive (12/22/2020)    [facility]    Exercise Vital Sign     Days of Exercise per Week: 0 days     Minutes of Exercise per Session: 0 min      Occ:  Children: 2  Retired         REVIEW OF SYSTEMS:   GENERAL: feels well otherwise  SKIN: denies any unusual skin lesions  EYES:denies blurred vision or double vision  HEENT: denies nasal congestion, sinus pain or ST  LUNGS: denies shortness of breath with exertion  CARDIOVASCULAR: denies chest pain on exertion  GI: denies abdominal pain,denies heartburn  : denies nocturia or changes in stream  MUSCULOSKELETAL: denies back pain  NEURO: denies headaches  PSYCHE: denies depression or anxiety  HEMATOLOGIC: denies hx of anemia  ENDOCRINE:  thyroid  history  ALL/ASTHMA: allergies / copd    EXAM:   /68   Pulse 78   Resp 18   Ht 5' 3.5\" (1.613 m)   Wt 135 lb (61.2 kg)   SpO2 97%   BMI 23.54 kg/m²   Body mass index is 23.54 kg/m².   GENERAL: well developed, well nourished,in no apparent distress  SKIN: no rashes,no suspicious lesions  HEENT: atraumatic, normocephalic,ears and throat are clear  EYES:PERRLA, EOMI, normal optic disk,conjunctiva are clear  NECK: supple,no adenopathy,no bruits, no JVD  CHEST: no chest tenderness  BREAST: no dominant or suspicious mass  LUNGS: clear to auscultation  CARDIO: RRR without murmur  GI: good BS's,no masses, HSM or tenderness  MUSCULOSKELETAL: back is not tender,FROM of the back  EXTREMITIES: no cyanosis, clubbing or edema  NEURO: Oriented times three,cranial nerves are intact,motor and sensory are grossly intact    ASSESSMENT AND PLAN:   Sophia Rendon is a 73 year old female who presents for preop clearance for     1. Preoperative clearance  Cleared for surgery   Was cleared by cardiology - s/p PET scan / EKG sent over   - EKG 12 Lead performed at [facility]; Future  - Basic Metabolic Panel (8) [E]; Future    2. Invasive lobular carcinoma of breast in female (HCC)    - EKG 12 Lead performed at [facility]; Future  - Basic Metabolic Panel (8) [E]; Future    3. Hypothyroidism, unspecified type    - EKG 12 Lead performed at [facility]; Future  - Basic Metabolic Panel (8) [E]; Future    4. Hyperlipidemia, unspecified hyperlipidemia type    - EKG 12 Lead performed at [facility]; Future  - Basic Metabolic Panel (8) [E]; Future    5. Coronary artery disease involving native heart, unspecified vessel or lesion type, unspecified whether angina present    - EKG 12 Lead performed at [facility]; Future  - Basic Metabolic Panel (8) [E]; Future    6. Centrilobular emphysema (HCC)    - EKG 12 Lead performed at [facility]; Future  - Basic Metabolic Panel (8) [E]; Future    7. Anxiety    - EKG 12  Lead performed at [facility]; Future  - Basic Metabolic Panel (8) [E]; Future    8. Moderate recurrent major depression (HCC)    - EKG 12 Lead performed at [facility]; Future  - Basic Metabolic Panel (8) [E]; Future    9. Thoracic aorta atherosclerosis (HCC)    - EKG 12 Lead performed at [facility]; Future  - Basic Metabolic Panel (8) [E]; Future    10. Tobacco abuse    - EKG 12 Lead performed at [facility]; Future  - Basic Metabolic Panel (8) [E]; Future    11. Migraine with aura and without status migrainosus, not intractable    - EKG 12 Lead performed at [facility]; Future  - Basic Metabolic Panel (8) [E]; Future        Pt is hisgh risk for a low risk procedure.   RTC 1-2 mo or sooner if needed.

## 2024-08-13 ENCOUNTER — TELEPHONE (OUTPATIENT)
Dept: SURGERY | Facility: CLINIC | Age: 74
End: 2024-08-13

## 2024-08-13 NOTE — TELEPHONE ENCOUNTER
Returned patient call regarding her question in relation to the nicotine patch. Clarified with PAT. As per pre admission testing patient has to remove the patch before coming for the procedure. Patient was informed of the same. Patient verbalized understanding. Will contact our office if have any further questions.

## 2024-08-13 NOTE — TELEPHONE ENCOUNTER
Tennille from pre admission testing called back regarding the patient question related to her nicotine patch. As per Tennille (PAT nurse) patient should remove the nicotine patch before coming for her surgery. Information was send to the patient via my chart and patient was called to inform of the same.

## 2024-08-15 ENCOUNTER — ANESTHESIA EVENT (OUTPATIENT)
Dept: SURGERY | Facility: HOSPITAL | Age: 74
End: 2024-08-15
Payer: MEDICARE

## 2024-08-15 NOTE — ANESTHESIA PREPROCEDURE EVALUATION
PRE-OP EVALUATION    Patient Name: Sophia Rendon    Admit Diagnosis: Invasive lobular carcinoma of breast in female (HCC) [C50.919]    Pre-op Diagnosis: Invasive lobular carcinoma of breast in female (HCC) [C50.919]    Right breast wire localized central lumpectomy,right breast lymphoscintigraphy, right breast sentinel lymph node biopsy    Anesthesia Procedure: Right breast wire localized central lumpectomy,right breast lymphoscintigraphy, right breast sentinel lymph node biopsy (Right)    Surgeons and Role:     * Liliana Yu MD - Primary    Pre-op vitals reviewed.        Body mass index is 23.36 kg/m².    Current medications reviewed.  Hospital Medications:  No current facility-administered medications on file as of .       Outpatient Medications:     No medications prior to admission.       Allergies: Codeine; Epinephrine; Penicillin g; Sulfa antibiotics; Eggs or egg-derived products; Berries; Chicken allergy; Chocolate; Clindamycin; Dairy products; Grass; Molds & smuts; Mushrooms; Soy [isoflavones, soy]; and Biaxin [clarithromycin]      Anesthesia Evaluation    Patient summary reviewed.    Anesthetic Complications  (-) history of anesthetic complications         GI/Hepatic/Renal                                 Cardiovascular            MET: >4      (+) hypertension and well controlled    (+) CAD                  (+) angina   Type: stable angina           Endo/Other           (+) hypothyroidism            (-) thrombocytopenia           Pulmonary        (+) COPD and moderate  COPD not requiring home oxygen.    (+) shortness of breath            Neuro/Psych      (+) depression  (+) anxiety           (+) headaches           Anxiety Centrilobular emphysema (HCC)  Chronic obstructive pulmonary disease (HCC) Coronary artery disease involving native heart  Diverticulosis of intestine without bleeding Goiter  Hypothyroidism Invasive lobular carcinoma of breast in female (HCC)  Migraine with aura and without  status migrainosus, not intractable Moderate recurrent major depression (HCC)  Platelet inhibition due to Plavix Stable angina (HCC)  Symptomatic cholelithiasis Thoracic aorta atherosclerosis (HCC)  Tobacco abuse Vitamin D deficiency            Past Surgical History:   Procedure Laterality Date    Angioplasty (coronary)      With STENT    Appendectomy      Appendectomy,w othr proc      right OOPHORECTOMY    Hysterectomy  2012    total hystero.    Susie localization wire 1 site left (cpt=19281) Left 1980    benign.    Susie localization wire 1 site right (cpt=19281) Right 1990    benign.    Oophorectomy Bilateral 2012    total hystero.    Other surgical history      Fibroadenoma    Other surgical history      Ruptured cyst- right ovary    Tonsillectomy       Social History     Socioeconomic History    Marital status:    Occupational History    Occupation:    Tobacco Use    Smoking status: Every Day     Current packs/day: 1.00     Types: Cigarettes    Smokeless tobacco: Never   Vaping Use    Vaping status: Never Used   Substance and Sexual Activity    Alcohol use: Not Currently     Comment: minimal    Drug use: No   Other Topics Concern    Caffeine Concern No    Exercise Yes     History   Drug Use No     Available pre-op labs reviewed.     Lab Results   Component Value Date     08/08/2024    K 4.0 08/08/2024     08/08/2024    CO2 31.0 08/08/2024    BUN 10 08/08/2024    CREATSERUM 0.72 08/08/2024    GLU 90 08/08/2024    CA 9.8 08/08/2024            Airway      Mallampati: I  Mouth opening: >3 FB  TM distance: > 6 cm  Neck ROM: full Cardiovascular    Cardiovascular exam normal.         Dental    Dentition appears grossly intact         Pulmonary    Pulmonary exam normal.                 Other findings            ASA: 3   Plan: general  NPO status verified and         Comment: disc  Plan/risks discussed with: patient                Present on Admission:  **None**

## 2024-08-16 ENCOUNTER — HOSPITAL ENCOUNTER (OUTPATIENT)
Dept: MAMMOGRAPHY | Facility: HOSPITAL | Age: 74
Discharge: HOME | End: 2024-08-16
Attending: SURGERY
Payer: MEDICARE

## 2024-08-16 ENCOUNTER — HOSPITAL ENCOUNTER (OUTPATIENT)
Facility: HOSPITAL | Age: 74
Setting detail: HOSPITAL OUTPATIENT SURGERY
Discharge: HOME | End: 2024-08-16
Attending: SURGERY | Admitting: SURGERY
Payer: MEDICARE

## 2024-08-16 ENCOUNTER — HOSPITAL ENCOUNTER (OUTPATIENT)
Dept: NUCLEAR MEDICINE | Facility: HOSPITAL | Age: 74
Discharge: HOME | End: 2024-08-16
Attending: SURGERY
Payer: MEDICARE

## 2024-08-16 ENCOUNTER — ANESTHESIA (OUTPATIENT)
Dept: SURGERY | Facility: HOSPITAL | Age: 74
End: 2024-08-16
Payer: MEDICARE

## 2024-08-16 VITALS
DIASTOLIC BLOOD PRESSURE: 57 MMHG | OXYGEN SATURATION: 95 % | HEART RATE: 50 BPM | HEIGHT: 63.5 IN | WEIGHT: 133.81 LBS | TEMPERATURE: 97 F | RESPIRATION RATE: 16 BRPM | SYSTOLIC BLOOD PRESSURE: 127 MMHG | BODY MASS INDEX: 23.42 KG/M2

## 2024-08-16 DIAGNOSIS — C50.919 INVASIVE LOBULAR CARCINOMA OF BREAST IN FEMALE (HCC): ICD-10-CM

## 2024-08-16 DIAGNOSIS — C50.919 INVASIVE LOBULAR CARCINOMA OF BREAST IN FEMALE (HCC): Primary | ICD-10-CM

## 2024-08-16 PROCEDURE — 88305 TISSUE EXAM BY PATHOLOGIST: CPT | Performed by: SURGERY

## 2024-08-16 PROCEDURE — 3E0W3KZ INTRODUCTION OF OTHER DIAGNOSTIC SUBSTANCE INTO LYMPHATICS, PERCUTANEOUS APPROACH: ICD-10-PCS | Performed by: SURGERY

## 2024-08-16 PROCEDURE — 88342 IMHCHEM/IMCYTCHM 1ST ANTB: CPT | Performed by: SURGERY

## 2024-08-16 PROCEDURE — 88307 TISSUE EXAM BY PATHOLOGIST: CPT | Performed by: SURGERY

## 2024-08-16 PROCEDURE — 07B50ZX EXCISION OF RIGHT AXILLARY LYMPHATIC, OPEN APPROACH, DIAGNOSTIC: ICD-10-PCS | Performed by: SURGERY

## 2024-08-16 PROCEDURE — 88344 IMHCHEM/IMCYTCHM EA MLT ANTB: CPT | Performed by: SURGERY

## 2024-08-16 PROCEDURE — 76098 X-RAY EXAM SURGICAL SPECIMEN: CPT | Performed by: SURGERY

## 2024-08-16 PROCEDURE — 88341 IMHCHEM/IMCYTCHM EA ADD ANTB: CPT | Performed by: SURGERY

## 2024-08-16 PROCEDURE — 0HBT0ZZ EXCISION OF RIGHT BREAST, OPEN APPROACH: ICD-10-PCS | Performed by: SURGERY

## 2024-08-16 PROCEDURE — 78195 LYMPH SYSTEM IMAGING: CPT | Performed by: SURGERY

## 2024-08-16 PROCEDURE — 19281 PERQ DEVICE BREAST 1ST IMAG: CPT | Performed by: SURGERY

## 2024-08-16 RX ORDER — MIDAZOLAM HYDROCHLORIDE 1 MG/ML
INJECTION INTRAMUSCULAR; INTRAVENOUS
Status: COMPLETED
Start: 2024-08-16 | End: 2024-08-16

## 2024-08-16 RX ORDER — HYDROMORPHONE HYDROCHLORIDE 1 MG/ML
0.4 INJECTION, SOLUTION INTRAMUSCULAR; INTRAVENOUS; SUBCUTANEOUS EVERY 5 MIN PRN
Status: DISCONTINUED | OUTPATIENT
Start: 2024-08-16 | End: 2024-08-16

## 2024-08-16 RX ORDER — DIAZEPAM 5 MG
5 TABLET ORAL AS NEEDED
Status: DISCONTINUED | OUTPATIENT
Start: 2024-08-16 | End: 2024-08-16 | Stop reason: HOSPADM

## 2024-08-16 RX ORDER — SODIUM CHLORIDE, SODIUM LACTATE, POTASSIUM CHLORIDE, CALCIUM CHLORIDE 600; 310; 30; 20 MG/100ML; MG/100ML; MG/100ML; MG/100ML
INJECTION, SOLUTION INTRAVENOUS CONTINUOUS
Status: DISCONTINUED | OUTPATIENT
Start: 2024-08-16 | End: 2024-08-16

## 2024-08-16 RX ORDER — DEXAMETHASONE SODIUM PHOSPHATE 4 MG/ML
VIAL (ML) INJECTION AS NEEDED
Status: DISCONTINUED | OUTPATIENT
Start: 2024-08-16 | End: 2024-08-16 | Stop reason: SURG

## 2024-08-16 RX ORDER — HYDROMORPHONE HYDROCHLORIDE 1 MG/ML
0.6 INJECTION, SOLUTION INTRAMUSCULAR; INTRAVENOUS; SUBCUTANEOUS EVERY 5 MIN PRN
Status: DISCONTINUED | OUTPATIENT
Start: 2024-08-16 | End: 2024-08-16

## 2024-08-16 RX ORDER — MIDAZOLAM HYDROCHLORIDE 1 MG/ML
1 INJECTION INTRAMUSCULAR; INTRAVENOUS EVERY 5 MIN PRN
Status: DISCONTINUED | OUTPATIENT
Start: 2024-08-16 | End: 2024-08-16

## 2024-08-16 RX ORDER — ACETAMINOPHEN 500 MG
1000 TABLET ORAL ONCE
Status: DISCONTINUED | OUTPATIENT
Start: 2024-08-16 | End: 2024-08-16 | Stop reason: HOSPADM

## 2024-08-16 RX ORDER — HYDROCODONE BITARTRATE AND ACETAMINOPHEN 5; 325 MG/1; MG/1
1 TABLET ORAL ONCE AS NEEDED
Status: DISCONTINUED | OUTPATIENT
Start: 2024-08-16 | End: 2024-08-16

## 2024-08-16 RX ORDER — HYDROMORPHONE HYDROCHLORIDE 1 MG/ML
0.2 INJECTION, SOLUTION INTRAMUSCULAR; INTRAVENOUS; SUBCUTANEOUS EVERY 5 MIN PRN
Status: DISCONTINUED | OUTPATIENT
Start: 2024-08-16 | End: 2024-08-16

## 2024-08-16 RX ORDER — CEFAZOLIN SODIUM 1 G/3ML
INJECTION, POWDER, FOR SOLUTION INTRAMUSCULAR; INTRAVENOUS AS NEEDED
Status: DISCONTINUED | OUTPATIENT
Start: 2024-08-16 | End: 2024-08-16 | Stop reason: SURG

## 2024-08-16 RX ORDER — METOCLOPRAMIDE HYDROCHLORIDE 5 MG/ML
10 INJECTION INTRAMUSCULAR; INTRAVENOUS EVERY 8 HOURS PRN
Status: DISCONTINUED | OUTPATIENT
Start: 2024-08-16 | End: 2024-08-16

## 2024-08-16 RX ORDER — HYDROCODONE BITARTRATE AND ACETAMINOPHEN 5; 325 MG/1; MG/1
1-2 TABLET ORAL EVERY 6 HOURS PRN
Qty: 20 TABLET | Refills: 0 | Status: SHIPPED | OUTPATIENT
Start: 2024-08-16

## 2024-08-16 RX ORDER — LABETALOL HYDROCHLORIDE 5 MG/ML
5 INJECTION, SOLUTION INTRAVENOUS EVERY 5 MIN PRN
Status: DISCONTINUED | OUTPATIENT
Start: 2024-08-16 | End: 2024-08-16

## 2024-08-16 RX ORDER — ONDANSETRON 2 MG/ML
4 INJECTION INTRAMUSCULAR; INTRAVENOUS EVERY 6 HOURS PRN
Status: DISCONTINUED | OUTPATIENT
Start: 2024-08-16 | End: 2024-08-16

## 2024-08-16 RX ORDER — ACETAMINOPHEN 500 MG
1000 TABLET ORAL ONCE AS NEEDED
Status: DISCONTINUED | OUTPATIENT
Start: 2024-08-16 | End: 2024-08-16

## 2024-08-16 RX ORDER — HYDROCODONE BITARTRATE AND ACETAMINOPHEN 5; 325 MG/1; MG/1
2 TABLET ORAL ONCE AS NEEDED
Status: DISCONTINUED | OUTPATIENT
Start: 2024-08-16 | End: 2024-08-16

## 2024-08-16 RX ORDER — GLYCOPYRROLATE 0.2 MG/ML
INJECTION, SOLUTION INTRAMUSCULAR; INTRAVENOUS AS NEEDED
Status: DISCONTINUED | OUTPATIENT
Start: 2024-08-16 | End: 2024-08-16 | Stop reason: SURG

## 2024-08-16 RX ORDER — LIDOCAINE HYDROCHLORIDE 10 MG/ML
INJECTION, SOLUTION EPIDURAL; INFILTRATION; INTRACAUDAL; PERINEURAL AS NEEDED
Status: DISCONTINUED | OUTPATIENT
Start: 2024-08-16 | End: 2024-08-16 | Stop reason: SURG

## 2024-08-16 RX ORDER — ONDANSETRON 2 MG/ML
INJECTION INTRAMUSCULAR; INTRAVENOUS AS NEEDED
Status: DISCONTINUED | OUTPATIENT
Start: 2024-08-16 | End: 2024-08-16 | Stop reason: SURG

## 2024-08-16 RX ORDER — NALOXONE HYDROCHLORIDE 0.4 MG/ML
0.08 INJECTION, SOLUTION INTRAMUSCULAR; INTRAVENOUS; SUBCUTANEOUS AS NEEDED
Status: DISCONTINUED | OUTPATIENT
Start: 2024-08-16 | End: 2024-08-16

## 2024-08-16 RX ORDER — BUPIVACAINE HYDROCHLORIDE 5 MG/ML
INJECTION, SOLUTION EPIDURAL; INTRACAUDAL AS NEEDED
Status: DISCONTINUED | OUTPATIENT
Start: 2024-08-16 | End: 2024-08-16 | Stop reason: HOSPADM

## 2024-08-16 RX ORDER — LIDOCAINE/PRILOCAINE 2.5 %-2.5%
CREAM (GRAM) TOPICAL ONCE
Status: COMPLETED | OUTPATIENT
Start: 2024-08-16 | End: 2024-08-16

## 2024-08-16 RX ADMIN — DEXAMETHASONE SODIUM PHOSPHATE 8 MG: 4 MG/ML VIAL (ML) INJECTION at 16:25:00

## 2024-08-16 RX ADMIN — CEFAZOLIN SODIUM 2 G: 1 INJECTION, POWDER, FOR SOLUTION INTRAMUSCULAR; INTRAVENOUS at 16:15:00

## 2024-08-16 RX ADMIN — LIDOCAINE HYDROCHLORIDE 50 MG: 10 INJECTION, SOLUTION EPIDURAL; INFILTRATION; INTRACAUDAL; PERINEURAL at 16:06:00

## 2024-08-16 RX ADMIN — ONDANSETRON 4 MG: 2 INJECTION INTRAMUSCULAR; INTRAVENOUS at 16:32:00

## 2024-08-16 RX ADMIN — GLYCOPYRROLATE 0.2 MG: 0.2 INJECTION, SOLUTION INTRAMUSCULAR; INTRAVENOUS at 16:30:00

## 2024-08-16 NOTE — IMAGING NOTE
Assisted Dr. Johnson with needle localization of right breast for lumpectomy. Procedure explained and all questions answered. Pt verbalized understanding. Emotional support provided and pt tolerated procedure well with minimal discomfort. Wire(s) secured with Tegaderm dressing.  Pt transported to OR holding via W/C with wire intact.

## 2024-08-16 NOTE — BRIEF OP NOTE
Pre-Operative Diagnosis: Invasive lobular carcinoma of breast in female (HCC) [C50.919]     Post-Operative Diagnosis: * No post-op diagnosis entered *      Procedure Performed:   Right breast wire localized central lumpectomy,right breast lymphoscintigraphy, right breast sentinel lymph node biopsy    Surgeons and Role:     * Liliana Yu MD - Primary    Assistant(s):   Pita Ahuja     Surgical Findings: Mass seen on specimen xray, coil clip not identified, wide margins x 5 taken around lumpectomy cavity excluding anterior     Specimen: Margins x 5, right central lumpectomy, right sentinel lymph node x 3     Estimated Blood Loss: 5cc    Liliana Yu MD  8/16/2024  3:49 PM

## 2024-08-16 NOTE — ANESTHESIA POSTPROCEDURE EVALUATION
[facility] Patient Status:  Hospital Outpatient Surgery   Age/Gender 73 year old female MRN [id number]   Location [facility] POST ANESTHESIA CARE UNIT Attending Liliana Yu MD   Hosp Day # 0 PCP Mallory Johnson DO       Anesthesia Post-op Note    Right breast wire localized central lumpectomy,right breast lymphoscintigraphy, right breast sentinel lymph node biopsy    Procedure Summary       Date: 08/16/24 Room / Location:  MAIN OR 17 /  MAIN OR    Anesthesia Start: 1606 Anesthesia Stop: 1718    Procedure: Right breast wire localized central lumpectomy,right breast lymphoscintigraphy, right breast sentinel lymph node biopsy (Right: Breast) Diagnosis:       Invasive lobular carcinoma of breast in female (HCC)      (Invasive lobular carcinoma of breast in female (HCC) [C50.919])    Surgeons: Liliana Yu MD Responsible Provider: Javed Velez DO    Anesthesia Type: general ASA Status: 3            Anesthesia Type: general    Vitals Value Taken Time   BP 80/50 08/16/24 1718   Temp 98 °F (36.7 °C) 08/16/24 1718   Pulse 70 08/16/24 1718   Resp 16 08/16/24 1718   SpO2 99 % 08/16/24 1718       Patient Location: PACU    Anesthesia Type: general    Airway Patency: patent    Postop Pain Control: adequate    Mental Status: mildly sedated but able to meaningfully participate in the post-anesthesia evaluation    Nausea/Vomiting: none    Cardiopulmonary/Hydration status: Other: (BP a little low, awake and talking, will give extra fluids)    Complications: no apparent anesthesia related complications    Postop vital signs: stable    Dental Exam: Unchanged from Preop    Patient to be discharged from PACU when criteria met.

## 2024-08-16 NOTE — DISCHARGE INSTRUCTIONS
Breast Surgery  Post-operative Instructions  Excisional Biopsy, Lumpectomy, Mastectomy, [place] Node Biopsy, or Axillary  Lymph Node Dissection  Liliana Yu MD  General Instructions  The following instructions will provide helpful information that will assist your recovery. These are designed to be general guidelines. Please remember that everyone heals and recovers differently. Listen to your body and rest when you are tired. If you have any questions or concerns, please do not hesitate to contact my office. I would like to see you in the office about one week after surgery, please schedule and appointment through my office to make a post-operative appointment if you do not already have one.     Restrictions  There are no lifting weight restrictions for the arm on the surgical side. You may gradually increase the amount of weight based on your comfort level. You should avoid a lot of repetitious activity with the arm until the drain is out (if one was placed) and the wound is well-healed (about two weeks).   You should not drive a car until you believe you can react to an emergency situation and you’re no longer taking narcotic pain medications.   You may shower the day after surgery. You should not bathe or swim (i.e. submerge wound) until the wound is well healed (about two weeks).  There are no dietary restrictions.    Exercise  You may begin arm exercises within a couple days. Do these 2 or 3 times per day, beginning with light exercise and gradually increase your range of motion and repetitions. This will help your arm regain full mobility. We will address your activity level again at your post-operative visit.   You will have pain medication prescribed before discharge. Take this as directed to relieve pain. It is important that you be comfortable so that you may continue your stretching exercises.   If you find the medication prescribed is too strong, try Tylenol (Acetaminophen) or  Ibuprofen.    Wound Care  You may remove the gauze dressing on the first or second postoperative day and then shower. You should leave the steri-strips in place; they will start to peel off about 10 days after your surgery. The stitches are all underneath the skin and will dissolve on their own. You will not need any stitches removed except if you have a drain in place.  I encourage you to shower once the outer bandage is removed, you may use soap and water directly over the steri-strips and pat dry following.  You should keep gauze dressing on the wound until the wound is completely dry and without drainage-usually 1-3 days.   If a surgical bra was placed after the surgery, I encourage you to wear it as much as possible during the week following the procedure (including during sleep). Alternatively, you may choose to wear your own bra provided it is comfortable, provides support and does not have an underwire. If the breast doesn’t move it is less painful.  If an elastic bandage was placed around your chest after the surgery you may remove it on the 1st or 2nd day after surgery. If you prefer to leave it on longer, you may.  It is normal to feel a lump in the area of the incisions for up to 6 months. This is part of the healing process. Eventually the breast will return to its normal condition.   Drain Care (if placed at time of axillary dissection or mastectomy)-This will be explained by your nurse prior to discharge.  Empty the drain and strip the tubing 2-3 times daily, more often if the plastic squeeze bottle fills up. This will prevent the tubing from clogging.   Starting at the top of the tubing next to your body firmly grasp the tubing with the index finger and thumb of one hand. With the other hand use the index finger and thumb to move the fluid down the tubing (this is called “stripping the tubing”).   Uncap the pouring spout and squeeze the contents of the plastic bottle into the measuring cup.   Squeeze  the bottle flat to create suction and replace the cap while squeezing to maintain the vacuum.   Measure and record the output and discard the fluid into the toilet. Record the output each time you empty the bottle.   Keep track of the output (drainage) and when the total is down to 30 cc’s or less over a 24-hour period we’ll remove the drain in the office. This is usually after 1-2  weeks, but can be longer in certain patients.   Drain removal takes about 30 seconds and is virtually painless. The suture is cut and the drain slides right out. You should call my office as the output approaches 30 cc’s over 24 hours so that we may schedule an office visit for drain removal.  If you have had reconstruction your plastic surgeon will remove the drain according to their protocol.    Pain Medication  You will be given a prescription for a narcotic pain medication (usually Norco) upon discharge. Many patients have very little pain and don’t want to use the narcotic. Don’t be afraid to use it if you’re uncomfortable. If you’d prefer you may substitute Tylenol or Ibuprofen (Motrin, Advil). Using an ice pack for a few minutes over the incision can also alleviate pain. If you do use the narcotic medication, use an over the counter stool softener or gentle laxative and stay well-hydrated as constipation is not uncommon with narcotics.    Pathology Report  The Pathology report is usually available 4-5 business days following the surgery. I will call you  with the results once the report is available.    Notify my office if:   Your temperature is over 101.5 F   You notice increasing swelling, redness, warmth or drainage from around the incision or drain site.    If you experience any problems please call my office and either my nurse or myself will respond. After hours, you will be forwarded to my answering service which will help you get in touch with myself or the physician covering for me.

## 2024-08-16 NOTE — ANESTHESIA PROCEDURE NOTES
Airway  Date/Time: 8/16/2024 4:09 PM  Urgency: elective    Airway not difficult    General Information and Staff    Patient location during procedure: OR  Anesthesiologist: Javed Velez DO  Performed: anesthesiologist   Performed by: Javed Velez DO  Authorized by: Javed Velez DO      Indications and Patient Condition  Indications for airway management: anesthesia  Sedation level: deep  Preoxygenated: yes  Patient position: sniffing  Mask difficulty assessment: 1 - vent by mask    Final Airway Details  Final airway type: supraglottic airway      Successful airway: classic  Size 2.5       Number of attempts at approach: 1

## 2024-08-16 NOTE — H&P
History of Present Illness:   Ms. Sophia Rendon is a 73 year old woman who presents with discomfort and a lump that recently developed in her right breast.  She does have a family history of breast cancer and her sister tested positive for a CHEK2 genetic mutation.  She has a personal history of bilateral excision of fibroadenomas.  She denies any nipple discharge, skin changes or axillary concerns.  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  She had a bilateral diagnostic evaluation given her symptoms on June 24, 2024 that confirmed a 1 cm mass at her site of palpable concern in the 9:00 retroareolar area for which biopsy was recommended.  She was noted to have normal-appearing axillary lymph nodes on ultrasound.  She had her biopsy on June 27, 2024 and was found to have invasive lobular carcinoma, grade 1 measuring up to 3 mm that was ER greater than 95%, WY negative, HER2/gisela negative with a Ki-67 of 1%.  She has furthermore had an MRI to delineate extent of disease which took place on July 15, 2024 and confirmed a 2 x 2.1 x 1.7 cm enhancement extending to the nipple.  She is here today for evaluation and recommendations for further therapy.        Past Medical History       Past Medical History:    Colon cancer screening    COPD (chronic obstructive pulmonary disease)    Depression    Diverticulitis    Fall    Goiter    Graves disease    Heart attack (HCC)     4 stents    Heart disease    History of appendectomy    History of hysterectomy    Hypothyroid    Migraines    Ovarian mass    Pneumonia    Pneumonia, organism unspecified(486)    Post PTCA    Post traumatic stress disorder (PTSD)    Tobacco abuse    Unspecified essential hypertension            Past Surgical History         Past Surgical History:   Procedure Laterality Date    Angioplasty (coronary)         With STENT    Appendectomy        Appendectomy,w othr proc         right OOPHORECTOMY     Hysterectomy        total hystero.    Susie localization wire 1 site left (cpt=19281) Left      benign.    Susie localization wire 1 site right (cpt=19281) Right      benign.    Oophorectomy Bilateral      total hystero.    Other surgical history         Fibroadenoma    Other surgical history         Ruptured cyst- right ovary    Tonsillectomy                Gynecological History:  Pt is a   She achieved menarche at age 12   Age of Menopause: 48  Type: Medication induced  She denies any history of hormone replacement therapy   She has history of oral contraceptive use for 3 years, last in .  She denies infertility treatment to achieve pregnancy.     Medications:    Medications Ordered Today   No outpatient medications have been marked as taking for the 24 encounter (Appointment) with Liliana Yu MD.            Allergies:    Allergies         Allergies   Allergen Reactions    Epinephrine OTHER (SEE COMMENTS)       Injection    Penicillin G CARDIAC ARREST    Sulfa Antibiotics CARDIAC ARREST    Berries UNKNOWN    Chicken Allergy         And ALL FOWL type bird    Chocolate      Codeine      Dairy Products      Eggs Or Egg-Derived Products      Grass         trees    Molds & Smuts      Mushrooms      Soy [Isoflavones, Soy]              Family History:   Family History         Family History   Problem Relation Age of Onset    Psychiatric Mother           Severe Depression, anxiety    Cancer Mother           Heart    Other (Other) Mother           Hypothyroidism    Endocrine Disorder Father           cushing's disease    Other (Other) Father      Other (CHEK-2+) Sister      Breast Cancer Sister 65    Other (Other) Other           stroke, a-fib    Migraines Other              She is not of Ashkenazi Latter-day ancestry.     Social History:       History   Alcohol Use    Yes       Comment: minimal              History   Smoking Status    Every Day    Types: Cigarettes   Smokeless Tobacco    Never    Ms. Sophia Rendon is  with 0 children. She has 2 siblings. She is currently Retired     Review of Systems:  General:   The patient denies, fever, chills, night sweats, fatigue, generalized weakness, change in appetite or weight loss.     HEENT:     The patient denies eye irritation, +cataracts, redness, glaucoma, yellowing of the eyes, change in vision, color blindness, or +wearing contacts/glasses,+macular degeneration. The patient denies hearing loss, ringing in the ears, ear drainage, earaches, nasal congestion, nose bleeds, snoring, pain in mouth/throat, +hoarseness, change in voice, facial trauma.     Respiratory:  The patient denies chronic +cough, +phlegm, hemoptysis, pleurisy/chest pain, pneumonia, asthma, wheezing, difficulty in breathing with exertion, +emphysema, chronic bronchitis, shortness of breath or abnormal sound when breathing.      Cardiovascular:  There is no history of chest pain, chest pressure/discomfort, palpitations, irregular heartbeat, +fainting or near-fainting, difficulty breathing when lying flat, SOB/Coughing at night, swelling of the legs or chest pain while walking.     Breasts:  See history of present illness     Gastrointestinal:     There is no history of difficulty or pain with swallowing, reflux symptoms, vomiting, dark or bloody stools, constipation, yellowing of the skin, indigestion, nausea, change in bowel habits, diarrhea, abdominal pain or vomiting blood.      Genitourinary:  The patient denies frequent urination, +needing to get up at night to urinate, urinary hesitancy or retaining urine, painful urination, urinary incontinence, decreased urine stream, blood in the urine or vaginal/penile discharge.     Skin:    The patient denies rash, itching, skin lesions, +dry skin, change in skin color or change in moles.      Hematologic/Lymphatic:  The patient denies easily bruising or bleeding or persistent swollen glands or lymph nodes.      Musculoskeletal:  The  patient denies muscle aches/pain, joint pain, stiff joints, neck pain, back pain or bone pain.     Neuropsychiatric:  There is no history of +migraines or severe headaches, seizure/epilepsy, +speech problems, coordination problems, +trembling/tremors (PTSD), fainting/black outs, dizziness, memory problems, loss of sensation/numbness, +problems walking, weakness, tingling or burning in hands/feet. There is no history of +abusive relationship(Past), bipolar disorder, +sleep disturbance, +anxiety, +depression or +feeling of despair.     Endocrine:    There is no history of poor/slow wound healing, weight loss/gain, fertility or hormone problems, cold intolerance, +thyroid disease.      Allergic/Immunologic:  There is no history of hives, hay fever, angioedema or anaphylaxis.     /64 (BP Location: Right arm, Patient Position: Sitting, Cuff Size: adult)   Pulse 70   Temp 97.6 °F (36.4 °C) (Temporal)   Resp 18   Ht 1.613 m (5' 3.5\")   Wt 60.8 kg (134 lb)   SpO2 97%   BMI 23.36 kg/m²      Physical Exam:  The patient is an alert, oriented, well-nourished and  well-developed woman who appears her stated age. Her speech patterns and movements are normal. Her affect is appropriate.     HEENT: The head is normocephalic. The neck is supple. The thyroid is not enlarged and is without palpable masses/nodules. There are no palpable masses. The trachea is in the midline. Conjunctiva are clear, non-icteric.     Chest: The chest expands symmetrically. The lungs are clear to auscultation.     Heart: The rhythm is regular.  There are no murmurs, rubs, gallops or thrills.     Breasts:  Her breasts are symmetrical with a cup size 36DD.  Right breast: The skin, nipple ,and areola appear normal. There is no skin dimpling with movement of the pectoralis. There is no nipple retraction. No nipple discharge can be elicited. The parenchyma is mildly nodular. There is palpable swelling with biopsy site changes in the right breast      Retroareolar area. The axillary tail is normal.  Left breast:   The skin, nipple, and areola appear normal. There is no skin dimpling with movement of the pectoralis. There is no nipple retraction. No nipple discharge can be elicited. The parenchyma is mildly nodular. There are no dominant masses in the breast. The axillary tail is normal.     Abdomen:  The abdomen is soft, flat and non tender. The liver is not enlarged. There are no palpable masses.     Lymph Nodes:  The supraclavicular, axillary and cervical regions are free of significant lymphadenopathy.     Back: There is no vertebral column tenderness.     Skin: The skin appears normal. There are no suspicious appearing rashes or lesions.     Extremities: The extremities are without deformity, cyanosis or edema.     Impression:   Ms. Sophia Rendon is a 73 year old woman presents with right breast cancer, clinical stage T2 NX MX.     Discussion and Plan:  I had a discussion with the Patient regarding her breast exam. On exam today I found her to be healing well since recent biopsy with no other clinical findings.  I personally reviewed the recent imaging and pathology we discussed this at length.     The natural history and evolution of breast cancer were discussed with Ms. Sophia Rendon and her  family, including the difference between in-situ and invasive carcinoma, and the distinction  between local and systemic disease and local and systemic therapy. For local treatment options,  I explained the risks and benefits of breast conservation and mastectomy (with or without  reconstruction), including the fact that survival rates are equal with these two approaches.  If breast conservation is elected, I explained the need for free margins, the possibility of re-  excision to achieve free margins, and the need for post-operative radiotherapy. The approach  to mary ann staging was also described, including the technique, risks and benefits of sentinel  node  biopsy, the possible need for axillary dissection, and the long-term sequelae of this procedure.  With regard to systemic therapy, final recommendation will be made following receipt of final  pathology post-op, but I outlined the possibility of endocrine therapy, chemotherapy, and  herceptin, depending on tumor marker profile.  Following this discussion, where all of the patient's questions were answered, we agreed to  proceed with breast conservation due to her comorbidities, despite her CHEK2 mutation she is not motivated for mastectomy at this time.  She does require cardiac clearance given her comorbidities.  She is motivated for right breast wire localized central lumpectomy with right sentinel lymph node biopsy given the intimate relationship of the tumor with her nipple on her MRI. The risks and possible complications of the procedure were explained to the patient and her family and she understood and agreed to the proposed plan. She was given ample opportunity for questions and those questions were answered to her satisfaction. She has been  encouraged to contact the office with any questions or concerns prior to her next appointment.      This note was created by [employer] voice recognition. Errors in content may be related to improper recognition by the system; efforts to review and correct have been done but errors may still exist. Please be advised the primary purpose of this note is for me to communicate medical care. Standard sentence structure is not always used. Medical terminology and medical abbreviations may be used. There may be grammatical, typographical, and automated fill ins that may have errors missed in proofreading.    Pre-op Diagnosis: Invasive lobular carcinoma of breast in female (HCC) [C50.919]    The above referenced H&P was reviewed by Liliana Yu MD on 8/16/2024, the patient was examined and no significant changes have occurred in the patient's condition since the H&P was  performed.  I discussed with the patient and/or legal representative the potential benefits, risks and side effects of this procedure; the likelihood of the patient achieving goals; and potential problems that might occur during recuperation.  I discussed reasonable alternatives to the procedure, including risks, benefits and side effects related to the alternatives and risks related to not receiving this procedure.  We will proceed with procedure as planned.

## 2024-08-19 ENCOUNTER — TELEPHONE (OUTPATIENT)
Dept: HEMATOLOGY/ONCOLOGY | Facility: HOSPITAL | Age: 74
End: 2024-08-19

## 2024-08-19 NOTE — OPERATIVE REPORT
[facility]    PATIENT'S NAME: PILO SCRUGGS   ATTENDING PHYSICIAN: Liliana Yu M.D.   OPERATING PHYSICIAN: Liliana Yu M.D.   PATIENT ACCOUNT#:   [id number]    LOCATION:  [facility] [address]  MEDICAL RECORD #:   [id number]       YOB: 1950  ADMISSION DATE:       08/16/2024      OPERATION DATE:  08/16/2024    OPERATIVE REPORT    PREOPERATIVE DIAGNOSIS:  Invasive lobular carcinoma of the right breast.  POSTOPERATIVE DIAGNOSIS:  Invasive lobular carcinoma of the right breast.  PROCEDURE:  Right breast wire-localized central lumpectomy with right breast specimen radiography, right breast injection of blue dye for sentinel lymph node identification, right sentinel lymph node biopsy, and right breast advancement flap mastopexy for a defect measuring 16 sq cm.    ASSISTANT:  Pita Ahuja CSA    ESTIMATED BLOOD LOSS:  5 mL.    DRAINS:  None.    COMPLICATIONS:  None.    DISPOSITION:  Stable on transfer to the recovery room.    INDICATIONS:  The patient is a 73-year-old female presenting with discomfort in a lump that she found in her right breast.  She does have a family history of breast cancer, underwent a biopsy that confirmed invasive lobular carcinoma.  She had an MRI that delineated extensive disease and confirmed uptake immediately in the retroareolar area including enhancement extending to the nipple.  In light of this, we discussed her surgical options including breast conservation versus mastectomy.  We discussed the need for central lumpectomy with nipple-areolar excision given the involvement of the nipple on the MRI.  The approach to mary ann staging was described including the technique of sentinel node biopsy, possible need for axillary dissection, possible long-term sequelae of the procedure.  She was motived for breast conservation.  We discussed the need to achieve free margins, the possibility of re-excision to achieve free margins, as well as the possible  need for further postoperative systemic radiation and/or other systemic therapy.  Risks and possible complications were discussed with the patient including, but not limited to, infection, bleeding, injury to surrounding structures, and possible need for reoperation, and she agreed to the proposed surgery.    OPERATIVE TECHNIQUE:  The patient was brought to the imaging suite.  She had a wire localization of the area of the prior biopsy site in our radiology department.  She also underwent injection of radioisotope as well as lymphoscintigraphy for sentinel lymph node identification preoperatively in the nuclear medicine department.  She was brought to the OR, placed in supine position, properly padded and secured, given a dose of IV antibiotics, and sequential compression devices were applied to her legs for DVT prophylaxis.  General anesthesia was induced.  Diluted methylene blue dye was injected in a periareolar location to the right breast, massaged approximately 5 minutes, and the right breast and arm were prepped and draped in the usual sterile fashion.  Lidocaine 1% with epinephrine was used to infiltrate the skin and subcutaneous tissues of the targeted incision site.  A curvilinear incision was made with a 15-blade knife at the inferior aspect of her right axillary hairline after confirming uptake with a handheld gamma counter, and using sharp dissection and electrocautery, I dissected through the clavipectoral fascia to the deep axilla where I identified 3 sentinel lymph nodes.  These were sent for permanent pathologic evaluation labeled as right sentinel lymph nodes.  Wound was irrigated, hemostasis assured with electrocautery and hemoclips, and closed with an interrupted 3-0 Vicryl for deep layer and a running 4-0 subcuticular Monocryl for skin.  Mastisol and Steri-Strips were applied.  Marcaine 0.5% was instilled in the cavity to assist with postoperative analgesia.      Attention was taken toward the  breast.  An elliptical incision was made encompassing the nipple-areolar complex with a 15-blade knife in the skin.  Using sharp dissection and electrocautery, I dissected around the wire and could identify a palpable mass in this region.  This was excised during which, at the posterior aspect, we identified her biopsy site cavity.  The clip was not identified visually at this time.  Care was taken to take wide margins around this palpable concern.  This was then placed in the imaging device where specimen x-ray confirmed the presence of targeted mass with no visible biopsy clip.  Therefore, care was taken to take an additional 5 margins in superior, inferior, medial, lateral, and deep areas to ensure adequate acquisition of the tumor cavity.  Each of these margins were individually labeled with a clip at true margin and sent for permanent pathologic evaluation.  She was found to have a large defect measuring at least 16 sq cm thought to impair both optimal wound healing and cosmesis for which we proceeded with an advancement flap mastopexy.  This required that I place a counterincision through the immediate adjacent superomedial breast parenchyma down to the level of the pectoralis fascia.  I used a superior vascular pedicle and mobilized this into the aforementioned defect and secured this at the level of the pectoralis fascia with a running 3-0 PDS suture.  This wound was then closed with an interrupted 3-0 Vicryl for deep layer and a running 4-0 subcuticular Monocryl for skin.  The inside of her lumpectomy cavity was marked with clips in order to allow for adequate imaging and evaluation in surveillance purposes.  All counts were correct at the conclusion of the procedure.  The patient tolerated the procedure well.  Blood loss was minimal.  She was transferred to recovery in stable condition.     Dictated By Liliana Yu M.D.  d: 08/18/2024 08:54:01  t: 08/18/2024  15:34:37  [facility] [id number]  [facility] [place]/    cc: Mallory Johnson D.O.    Stuart Node Biopsy for Breast Cancer - Right  Operation performed with curative intent. Yes   Tracer(s) used to identify sentinel nodes in the upfront surgery (non-neoadjuvant) setting (select all that apply). Dye and Radioactive tracer   Tracer(s) used to identify sentinel nodes in the neoadjuvant setting (select all that apply). N/A   All nodes (colored or non-colored) present at the end of a dye-filled lymphatic channel were removed. Yes   All significantly radioactive nodes were removed. Yes   All palpably suspicious nodes were removed. Yes   Biopsy-proven positive nodes marked with clips prior to chemotherapy were identified and removed. N/A

## 2024-08-19 NOTE — TELEPHONE ENCOUNTER
Left voicemail for patient to call back, calling POD#[phone] to check to see how she is feeling and to schedule with medical oncology.

## 2024-08-22 ENCOUNTER — TELEPHONE (OUTPATIENT)
Dept: SURGERY | Facility: CLINIC | Age: 74
End: 2024-08-22

## 2024-08-22 NOTE — TELEPHONE ENCOUNTER
Called patient to check on the patient as the patient cancelled her post op appointment today With Randa Tamayo NP. Patient verbalized that she has a headache and had a panic attack this morning. She is feeling much better after she took her medication. She has support at home and her son is with her at home. She stated that she is healing well from surgery without any issues or complaints. All questions were answered to the best of my ability.Patient verbalized understanding. Will call our office if have any further questions or concerns.

## 2024-08-26 ENCOUNTER — TELEPHONE (OUTPATIENT)
Dept: HEMATOLOGY/ONCOLOGY | Facility: HOSPITAL | Age: 74
End: 2024-08-26

## 2024-08-26 NOTE — TELEPHONE ENCOUNTER
Patient returned phone call. Reviewed upcoming scheduled CT and bone scan. Will provide barium to patient tomorrow when she is here to see Dr. Yu.  Reviewed pathology report with patient.  No further questions at this time.

## 2024-08-26 NOTE — TELEPHONE ENCOUNTER
Left message for patient to call back. Calling regarding upcoming tests, CT and bone scan. Patient has a follow up with Dr. Yu tomorrow, will provide barium at that time .

## 2024-08-27 ENCOUNTER — OFFICE VISIT (OUTPATIENT)
Dept: SURGERY | Facility: CLINIC | Age: 74
End: 2024-08-27
Payer: MEDICARE

## 2024-08-27 ENCOUNTER — NURSE NAVIGATOR ENCOUNTER (OUTPATIENT)
Dept: HEMATOLOGY/ONCOLOGY | Facility: HOSPITAL | Age: 74
End: 2024-08-27

## 2024-08-27 VITALS
WEIGHT: 135.63 LBS | SYSTOLIC BLOOD PRESSURE: 120 MMHG | DIASTOLIC BLOOD PRESSURE: 61 MMHG | BODY MASS INDEX: 24 KG/M2 | TEMPERATURE: 98 F | OXYGEN SATURATION: 92 % | HEART RATE: 83 BPM | RESPIRATION RATE: 18 BRPM

## 2024-08-27 DIAGNOSIS — C50.919 INVASIVE LOBULAR CARCINOMA OF BREAST IN FEMALE (HCC): Primary | ICD-10-CM

## 2024-08-27 PROCEDURE — 3074F SYST BP LT 130 MM HG: CPT | Performed by: SURGERY

## 2024-08-27 PROCEDURE — 3078F DIAST BP <80 MM HG: CPT | Performed by: SURGERY

## 2024-08-27 PROCEDURE — 1159F MED LIST DOCD IN RCRD: CPT | Performed by: SURGERY

## 2024-08-27 PROCEDURE — 1160F RVW MEDS BY RX/DR IN RCRD: CPT | Performed by: SURGERY

## 2024-08-27 PROCEDURE — 99024 POSTOP FOLLOW-UP VISIT: CPT | Performed by: SURGERY

## 2024-08-27 PROCEDURE — 1125F AMNT PAIN NOTED PAIN PRSNT: CPT | Performed by: SURGERY

## 2024-08-27 NOTE — PROGRESS NOTES
Met with patient in clinic and provided barium for upcoming CT scan. Reviewed instructions on when to drink in relation to exam.  Provided brochure for Oncotype testing and that we will call her with results.  She is also aware that a  from [facility] will contact her to schedule the consultation.  She has direct number to navigators to call with any further questions.

## 2024-08-28 NOTE — PROGRESS NOTES
Breast Surgery Post-Operative Visit    Diagnosis: Right breast invasive lobular carcinoma status post central lumpectomy and sentinel lymph node biopsy on August 16, 2024.    Stage: T2 N1a MX    Disease Status:  Surgical treatment complete, medical and radiation oncology recommendations pending.    History:   This 73 year old woman presented with discomfort and a lump that recently developed in her right breast.  She does have a family history of breast cancer and her sister tested positive for a CHEK2 genetic mutation.  She has a personal history of bilateral excision of fibroadenomas.  She denies any nipple discharge, skin changes or axillary concerns.  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  She had a bilateral diagnostic evaluation given her symptoms on June 24, 2024 that confirmed a 1 cm mass at her site of palpable concern in the 9:00 retroareolar area for which biopsy was recommended.  She was noted to have normal-appearing axillary lymph nodes on ultrasound.  She had her biopsy on June 27, 2024 and was found to have invasive lobular carcinoma, grade 1 measuring up to 3 mm that was ER greater than 95%, MD negative, HER2/gisela negative with a Ki-67 of 1%.  She has furthermore had an MRI to delineate extent of disease which took place on July 15, 2024 and confirmed a 2 x 2.1 x 1.7 cm enhancement extending to the nipple.  Central lumpectomy and sentinel lymph node biopsy was recommended and took place without complication.  She denies any fevers or chills.  She does report some swelling and discomfort in her right axillary region.  She is here today for evaluation and recommendations for further therapy.        Past Medical History:    Anxiety state    Chronic PTSD    Breast cancer (HCC)    Cancer (HCC)    Cataract    Colon cancer screening    COPD (chronic obstructive pulmonary disease)    Depression    Disorder of thyroid    Diverticulitis    Fall     Goiter    Graves disease    Heart attack (HCC)    4 stents    Heart disease    History of appendectomy    History of hysterectomy    Hypothyroid    Migraines    Ovarian mass    Pneumonia    Pneumonia, organism unspecified(486)    Post PTCA    Post traumatic stress disorder (PTSD)    Pulmonary emphysema (HCC)    Shortness of breath    Tobacco abuse    Unspecified essential hypertension    Visual impairment    glasses       Past Surgical History:   Procedure Laterality Date    Angioplasty (coronary)      With STENT    Appendectomy      Appendectomy,w othr proc      right OOPHORECTOMY    Hysterectomy      total hystero.    Susie localization wire 1 site left (cpt=19281) Left     benign.    Susie localization wire 1 site right (cpt=19281) Right     benign.    Oophorectomy Bilateral     total hystero.    Other surgical history      Fibroadenoma    Other surgical history      Ruptured cyst- right ovary    Tonsillectomy         Gynecological History:  Pt is a   She achieved menarche at age 12   Age of Menopause: 48  Type: Medication induced  She denies any history of hormone replacement therapy   She has history of oral contraceptive use for 3 years, last in .  She denies infertility treatment to achieve pregnancy.    Medications:     Multiple Vitamins-Minerals (PRESERVISION AREDS 2 OR) Take by mouth.      Multiple Vitamins-Minerals (MULTI-VITAMIN/MINERALS) Oral Tab Take 1 tablet by mouth daily.      fluticasone-umeclidin-vilant (TRELEGY ELLIPTA) 200-62.5-25 MCG/ACT Inhalation Aerosol Powder, Breath Activated Inhale 1 puff into the lungs daily. 3 each 0    Budesonide-Formoterol Fumarate (SYMBICORT) 160-4.5 MCG/ACT Inhalation Aerosol Inhale 2 puffs into the lungs 2 (two) times daily. 3 each 1    levothyroxine (SYNTHROID) 88 MCG Oral Tab Take 1 tablet (88 mcg total) by mouth before breakfast. 90 tablet 3    Butalbital-APAP-Caffeine -40 MG Oral Tab Take 1 tablet by mouth every 6 (six) hours as needed  for Pain. Take one to 2 tablets as needed 30 tablet 0    Vortioxetine HBr (TRINTELLIX OR) Take 2.5 mg by mouth every other day. 1.5mg every 4 days and 2.5mg every 4 days      nitroGLYCERIN (NITROSTAT) 0.4 MG Sublingual SL Tab Place 1 tablet (0.4 mg total) under the tongue every 5 (five) minutes as needed. 30 tablet 0    aspirin EC 81 MG Oral Tab EC Take 1 tablet (81 mg total) by mouth daily. 30 tablet 11    Metoprolol Succinate ER (TOPROL XL) 25 MG Oral Take 0.5 tablets (12.5 mg total) by mouth daily. Takes 1/2 tablet (12.5 mg) daily      simvastatin (ZOCOR) 10 MG Oral Tab Take 1 tablet (10 mg total) by mouth nightly.      alprazolam (XANAX) 0.5 MG Oral Tab Take 1 tablet (0.5 mg total) by mouth. Take one tablet night PRN up to 4 tablets daily         Allergies:    Allergies   Allergen Reactions    Codeine DIZZINESS and OTHER (SEE COMMENTS)     Migraines     Epinephrine OTHER (SEE COMMENTS)     Injection    Penicillin G CARDIAC ARREST    Sulfa Antibiotics CARDIAC ARREST    Eggs Or Egg-Derived Products OTHER (SEE COMMENTS)     Makes her mucusy     Berries UNKNOWN    Chicken Allergy      And ALL FOWL type bird    Chocolate     Clindamycin OTHER (SEE COMMENTS)     .    Dairy Products     Grass      trees    Molds & Smuts     Mushrooms     Soy [Isoflavones, Soy]     Biaxin [Clarithromycin] DIARRHEA       Family History:   Family History   Problem Relation Age of Onset    Psychiatric Mother         Severe Depression, anxiety    Cancer Mother         Heart    Other (Other) Mother         Hypothyroidism    Endocrine Disorder Father         cushing's disease    Other (Other) Father     Other (CHEK-2+) Sister     Breast Cancer Sister 65    Other (Other) Other         stroke, a-fib    Migraines Other        She is not of Ashkenazi Catholic ancestry.    Social History:  History   Alcohol Use Not Currently     Comment: minimal       History   Smoking Status    Every Day    Types: Cigarettes   Smokeless Tobacco    Never   Ms. Cortes  MICHAEL Rendon is  with 0 children. She has 2 siblings. She is currently Retired    Review of Systems:  General:   The patient denies, fever, chills, night sweats, fatigue, generalized weakness, change in appetite or weight loss.    HEENT:     The patient denies eye irritation, +cataracts, redness, glaucoma, yellowing of the eyes, change in vision, color blindness, or +wearing contacts/glasses,+macular degeneration. The patient denies hearing loss, ringing in the ears, ear drainage, earaches, nasal congestion, nose bleeds, snoring, pain in mouth/throat, +hoarseness, change in voice, facial trauma.    Respiratory:  The patient denies chronic +cough, +phlegm, hemoptysis, pleurisy/chest pain, pneumonia, asthma, wheezing, difficulty in breathing with exertion, +emphysema, chronic bronchitis, shortness of breath or abnormal sound when breathing.     Cardiovascular:  There is no history of chest pain, chest pressure/discomfort, palpitations, irregular heartbeat, +fainting or near-fainting, difficulty breathing when lying flat, SOB/Coughing at night, swelling of the legs or chest pain while walking.    Breasts:  See history of present illness    Gastrointestinal:     There is no history of difficulty or pain with swallowing, reflux symptoms, vomiting, dark or bloody stools, constipation, yellowing of the skin, indigestion, nausea, change in bowel habits, diarrhea, abdominal pain or vomiting blood.     Genitourinary:  The patient denies frequent urination, +needing to get up at night to urinate, urinary hesitancy or retaining urine, painful urination, urinary incontinence, decreased urine stream, blood in the urine or vaginal/penile discharge.    Skin:    The patient denies rash, itching, skin lesions, +dry skin, change in skin color or change in moles.     Hematologic/Lymphatic:  The patient denies easily bruising or bleeding or persistent swollen glands or lymph nodes.     Musculoskeletal:  The patient denies muscle  aches/pain, joint pain, stiff joints, neck pain, back pain or bone pain.    Neuropsychiatric:  There is no history of +migraines or severe headaches, seizure/epilepsy, +speech problems, coordination problems, +trembling/tremors (PTSD), fainting/black outs, dizziness, memory problems, loss of sensation/numbness, +problems walking, weakness, tingling or burning in hands/feet. There is no history of +abusive relationship(Past), bipolar disorder, +sleep disturbance, +anxiety, +depression or +feeling of despair.    Endocrine:    There is no history of poor/slow wound healing, weight loss/gain, fertility or hormone problems, cold intolerance, +thyroid disease.     Allergic/Immunologic:  There is no history of hives, hay fever, angioedema or anaphylaxis.    /61 (BP Location: Left arm, Patient Position: Sitting, Cuff Size: adult)   Pulse 83   Temp 98 °F (36.7 °C) (Temporal)   Resp 18   Wt 61.5 kg (135 lb 9.6 oz)   SpO2 92%   BMI 23.64 kg/m²     Physical Exam:  The patient is an alert, oriented, well-nourished and  well-developed woman who appears her stated age. Her speech patterns and movements are normal. Her affect is appropriate.    HEENT: The head is normocephalic. The neck is supple. The thyroid is not enlarged and is without palpable masses/nodules. There are no palpable masses. The trachea is in the midline. Conjunctiva are clear, non-icteric.    Chest: The chest expands symmetrically. The lungs are clear to auscultation.    Heart: The rhythm is regular.  There are no murmurs, rubs, gallops or thrills.    Breasts:  Her breasts are symmetrical with a cup size 36DD.  Right breast: The skin, nipple ,and areola appear normal. There is no skin dimpling with movement of the pectoralis. There is no nipple retraction. No nipple discharge can be elicited. The parenchyma is mildly nodular. There is palpable swelling with biopsy site changes in the right breast.  There is a well-healed incision in the right axilla  with a small underlying seroma and well-healed incision across the central chest with light bruising.    Retroareolar area. The axillary tail is normal.  Left breast:   The skin, nipple, and areola appear normal. There is no skin dimpling with movement of the pectoralis. There is no nipple retraction. No nipple discharge can be elicited. The parenchyma is mildly nodular. There are no dominant masses in the breast. The axillary tail is normal.    Abdomen:  The abdomen is soft, flat and non tender. The liver is not enlarged. There are no palpable masses.    Lymph Nodes:  The supraclavicular, axillary and cervical regions are free of significant lymphadenopathy.    Back: There is no vertebral column tenderness.    Skin: The skin appears normal. There are no suspicious appearing rashes or lesions.    Extremities: The extremities are without deformity, cyanosis or edema.    Impression:   Ms. Sophia Rendon is a 73 year old woman presents with right breast cancer, status post central lumpectomy and sentinel lymph node biopsy.    Discussion and Plan:  I had a discussion with the Patient regarding her breast exam. On exam today I found a moderate-sized symptomatic seroma in the right axilla for which I recommended aspiration and the risks and possible complications were discussed with the patient and she agreed to proceed.  I personally reviewed the pathology that showed a 3.5 cm tumor with a less than 1 mm involvement from the final lateral margin.  The final lateral margin was focal and not present at ink upon review with the pathologist.  Her right sentinel lymph node biopsy consisted of 5 nodes of which 3 had metastatic disease with foci of microscopic extranodal extension.  Her case was presented at multidisciplinary tumor board.  Medical oncology has recommended Oncotype DX to determine the correct adjuvant systemic treatment recommendation.  We discussed the case including the close margin and lymph nodes with  radiation and they felt that the patient would be suitable for whole breast and comprehensive mary ann radiation and lieu of any reexcision and/or completion  Lymph node dissection to avoid morbidity.      Aspiration of R axillary seroma  The right axilla was prepped and draped in usual sterile fashion.  1% lidocaine with epinephrine was used to infiltrate the targeted area.  A 22-gauge needle was inserted into the area of maximal fluctuance with successful aspiration of 20 cc serous fluid that was discarded.  She noted symptom improvement immediately.  She tolerated this with no immediate complications and a sterile dressing was applied.    The patient will be seen in 1 to 2 weeks to ensure adequate healing.  She will follow with medical and radiation oncology as per above for her next steps of treatment.  I anticipate her first set of imaging surveillance will take place in approximately 6 months.   I encouraged her to continue monitoring her ROM and strength and explained that a referral to physical therapy may be warranted in the future if she identifies any limitations or restrictions. She was encouraged to contact the office with any questions or concerns prior to her next scheduled appointment.

## 2024-08-30 ENCOUNTER — TELEPHONE (OUTPATIENT)
Dept: SURGERY | Facility: CLINIC | Age: 74
End: 2024-08-30

## 2024-08-30 NOTE — TELEPHONE ENCOUNTER
Called patient in regards to the issue she is having. Offered patient appointment with ariel on Tuesday. Patient accepted the appointment.Patient verbalized understanding.Massage sent to  for scheduling

## 2024-09-03 ENCOUNTER — HOSPITAL ENCOUNTER (OUTPATIENT)
Dept: NUCLEAR MEDICINE | Facility: HOSPITAL | Age: 74
Discharge: HOME | End: 2024-09-03
Attending: INTERNAL MEDICINE
Payer: MEDICARE

## 2024-09-03 ENCOUNTER — OFFICE VISIT (OUTPATIENT)
Dept: SURGERY | Facility: CLINIC | Age: 74
End: 2024-09-03
Payer: MEDICARE

## 2024-09-03 VITALS
WEIGHT: 135.81 LBS | RESPIRATION RATE: 18 BRPM | BODY MASS INDEX: 24 KG/M2 | TEMPERATURE: 99 F | HEART RATE: 91 BPM | SYSTOLIC BLOOD PRESSURE: 125 MMHG | OXYGEN SATURATION: 94 % | DIASTOLIC BLOOD PRESSURE: 69 MMHG

## 2024-09-03 DIAGNOSIS — C50.919 INVASIVE LOBULAR CARCINOMA OF BREAST IN FEMALE (HCC): ICD-10-CM

## 2024-09-03 DIAGNOSIS — C50.919 INVASIVE LOBULAR CARCINOMA OF BREAST IN FEMALE (HCC): Primary | ICD-10-CM

## 2024-09-03 DIAGNOSIS — C77.9 REGIONAL LYMPH NODE METASTASIS PRESENT (HCC): ICD-10-CM

## 2024-09-03 PROCEDURE — 1160F RVW MEDS BY RX/DR IN RCRD: CPT

## 2024-09-03 PROCEDURE — 3074F SYST BP LT 130 MM HG: CPT

## 2024-09-03 PROCEDURE — 1159F MED LIST DOCD IN RCRD: CPT

## 2024-09-03 PROCEDURE — 99024 POSTOP FOLLOW-UP VISIT: CPT

## 2024-09-03 PROCEDURE — 78306 BONE IMAGING WHOLE BODY: CPT | Performed by: INTERNAL MEDICINE

## 2024-09-03 PROCEDURE — 3078F DIAST BP <80 MM HG: CPT

## 2024-09-03 PROCEDURE — 1125F AMNT PAIN NOTED PAIN PRSNT: CPT

## 2024-09-04 ENCOUNTER — LABORATORY ENCOUNTER (OUTPATIENT)
Dept: LAB | Age: 74
End: 2024-09-04
Attending: FAMILY MEDICINE
Payer: MEDICARE

## 2024-09-04 ENCOUNTER — LAB ENCOUNTER (OUTPATIENT)
Dept: LAB | Age: 74
End: 2024-09-04
Attending: INTERNAL MEDICINE
Payer: MEDICARE

## 2024-09-04 DIAGNOSIS — I10 ESSENTIAL HYPERTENSION, MALIGNANT: ICD-10-CM

## 2024-09-04 DIAGNOSIS — E55.9 AVITAMINOSIS D: ICD-10-CM

## 2024-09-04 DIAGNOSIS — E78.2 MIXED HYPERLIPIDEMIA: Primary | ICD-10-CM
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
## 2024-09-06 ENCOUNTER — TELEPHONE (OUTPATIENT)
Dept: HEMATOLOGY/ONCOLOGY | Facility: HOSPITAL | Age: 74
End: 2024-09-06

## 2024-09-06 ENCOUNTER — HOSPITAL ENCOUNTER (OUTPATIENT)
Dept: CT IMAGING | Facility: HOSPITAL | Age: 74
Discharge: HOME | End: 2024-09-06
Attending: INTERNAL MEDICINE
Payer: MEDICARE

## 2024-09-06 DIAGNOSIS — C77.9 REGIONAL LYMPH NODE METASTASIS PRESENT (HCC): ICD-10-CM

## 2024-09-06 DIAGNOSIS — C50.919 INVASIVE LOBULAR CARCINOMA OF BREAST IN FEMALE (HCC): ICD-10-CM

## 2024-09-06 PROCEDURE — 74177 CT ABD & PELVIS W/CONTRAST: CPT | Performed by: INTERNAL MEDICINE

## 2024-09-06 PROCEDURE — 71260 CT THORAX DX C+: CPT | Performed by: INTERNAL MEDICINE

## 2024-09-06 NOTE — TELEPHONE ENCOUNTER
Called patient and verified her full name and  and provided her Oncotype score of 8. Stated there is less than 1% chemotherapy benefit and that Dr. John will discuss her results in full detail on Tuesday September 10, 2024 during her appointment. Patient stated she has had pressure in her breast and fluid has been removed and stated the measures ALEKSANDER Tolentino have helped at home. Patient has appointment with Dr. Yu as well on  and she is scheduled with radiation oncologist, Dr. Gomez, on 2024 in [place]. She thanked me for the phone call and assistance.
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
## 2024-09-10 ENCOUNTER — OFFICE VISIT (OUTPATIENT)
Dept: HEMATOLOGY/ONCOLOGY | Facility: HOSPITAL | Age: 74
End: 2024-09-10
Attending: GENETIC COUNSELOR, MS
Payer: MEDICARE

## 2024-09-10 ENCOUNTER — OFFICE VISIT (OUTPATIENT)
Dept: SURGERY | Facility: CLINIC | Age: 74
End: 2024-09-10
Payer: MEDICARE

## 2024-09-10 VITALS
SYSTOLIC BLOOD PRESSURE: 107 MMHG | BODY MASS INDEX: 23 KG/M2 | RESPIRATION RATE: 17 BRPM | DIASTOLIC BLOOD PRESSURE: 73 MMHG | HEART RATE: 79 BPM | TEMPERATURE: 98 F | WEIGHT: 134.63 LBS | OXYGEN SATURATION: 95 %

## 2024-09-10 VITALS
TEMPERATURE: 98 F | SYSTOLIC BLOOD PRESSURE: 132 MMHG | WEIGHT: 135 LBS | OXYGEN SATURATION: 94 % | RESPIRATION RATE: 18 BRPM | BODY MASS INDEX: 24 KG/M2 | DIASTOLIC BLOOD PRESSURE: 79 MMHG | HEART RATE: 84 BPM

## 2024-09-10 DIAGNOSIS — F40.00 AGORAPHOBIA: ICD-10-CM

## 2024-09-10 DIAGNOSIS — Z78.0 POSTMENOPAUSAL: Primary | ICD-10-CM

## 2024-09-10 DIAGNOSIS — C50.919 INVASIVE LOBULAR CARCINOMA OF BREAST IN FEMALE (HCC): ICD-10-CM

## 2024-09-10 DIAGNOSIS — C50.919 INVASIVE LOBULAR CARCINOMA OF BREAST IN FEMALE (HCC): Primary | ICD-10-CM

## 2024-09-10 DIAGNOSIS — M89.9 BONE DISORDER: ICD-10-CM

## 2024-09-10 DIAGNOSIS — F43.10 PTSD (POST-TRAUMATIC STRESS DISORDER): ICD-10-CM

## 2024-09-10 PROCEDURE — 3078F DIAST BP <80 MM HG: CPT | Performed by: SURGERY

## 2024-09-10 PROCEDURE — 1126F AMNT PAIN NOTED NONE PRSNT: CPT | Performed by: SURGERY

## 2024-09-10 PROCEDURE — 1160F RVW MEDS BY RX/DR IN RCRD: CPT | Performed by: SURGERY

## 2024-09-10 PROCEDURE — 99205 OFFICE O/P NEW HI 60 MIN: CPT | Performed by: INTERNAL MEDICINE

## 2024-09-10 PROCEDURE — 1159F MED LIST DOCD IN RCRD: CPT | Performed by: SURGERY

## 2024-09-10 PROCEDURE — 99024 POSTOP FOLLOW-UP VISIT: CPT | Performed by: SURGERY

## 2024-09-10 PROCEDURE — G2212 PROLONG OUTPT/OFFICE VIS: HCPCS | Performed by: INTERNAL MEDICINE

## 2024-09-10 PROCEDURE — 3074F SYST BP LT 130 MM HG: CPT | Performed by: SURGERY

## 2024-09-10 RX ORDER — LETROZOLE 2.5 MG/1
2.5 TABLET, FILM COATED ORAL DAILY
Qty: 30 TABLET | Refills: 6 | Status: SHIPPED | OUTPATIENT
Start: 2024-09-10

## 2024-09-16 ENCOUNTER — PATIENT MESSAGE (OUTPATIENT)
Dept: FAMILY MEDICINE CLINIC | Facility: CLINIC | Age: 74
End: 2024-09-16

## 2024-09-17 DIAGNOSIS — E03.8 OTHER SPECIFIED HYPOTHYROIDISM: ICD-10-CM

## 2024-09-17 RX ORDER — LEVOTHYROXINE SODIUM 88 MCG
88 TABLET ORAL
Qty: 90 TABLET | Refills: 3 | Status: SHIPPED | OUTPATIENT
Start: 2024-09-17

## 2024-09-17 NOTE — TELEPHONE ENCOUNTER
From: Sophia Rendon  To: Mallory Johnson  Sent: 9/16/2024 4:14 PM CDT  Subject: Egg free flu shot    Hi Dr. Johnson,  Can you please put me on your list to receive an egg free flu shot?  Thank you,   Sophia Rendon  [birth year]  [phone]

## 2024-09-18 ENCOUNTER — TELEPHONE (OUTPATIENT)
Dept: SURGERY | Facility: CLINIC | Age: 74
End: 2024-09-18

## 2024-09-18 ENCOUNTER — ORDER TRANSCRIPTION (OUTPATIENT)
Dept: PHYSICAL THERAPY | Facility: HOSPITAL | Age: 74
End: 2024-09-18

## 2024-09-18 DIAGNOSIS — L76.82 AXILLARY WEB SYNDROME: Primary | ICD-10-CM

## 2024-09-18 DIAGNOSIS — C50.919 INVASIVE LOBULAR CARCINOMA OF BREAST IN FEMALE (HCC): ICD-10-CM

## 2024-09-18 DIAGNOSIS — L90.5 AXILLARY WEB SYNDROME: Primary | ICD-10-CM

## 2024-09-18 NOTE — TELEPHONE ENCOUNTER
Returned patient call regarding her issue. Patient stated that she is feeling something in her armpit but there is no redness,or warmth to the area. An order for the lymphedema clinic was placed in the system. Patient was instructed to call the lymphedema clinic to schedule her appointment. Nurse practitioner talk to her also regarding the same. Number to lymphedema clinic was provided to the patient. Patient was advised to massage the area gently with lotion and do some ROM exercises. Patient verbalized understanding and will contact our office if have any additional questions.

## 2024-09-19 ENCOUNTER — TELEPHONE (OUTPATIENT)
Dept: PHYSICAL THERAPY | Facility: HOSPITAL | Age: 74
End: 2024-09-19

## 2024-09-19 NOTE — PROGRESS NOTES
Breast Surgery Post-Operative Visit    Diagnosis: Right breast invasive lobular carcinoma status post central lumpectomy and sentinel lymph node biopsy on August 16, 2024.    Stage: T2 N1a MX    Disease Status:  Surgical treatment complete, medical and radiation oncology recommendations pending.    History:   This 73 year old woman presented with discomfort and a lump that recently developed in her right breast.  She does have a family history of breast cancer and her sister tested positive for a CHEK2 genetic mutation.  She has a personal history of bilateral excision of fibroadenomas.  She denies any nipple discharge, skin changes or axillary concerns.  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  She had a bilateral diagnostic evaluation given her symptoms on June 24, 2024 that confirmed a 1 cm mass at her site of palpable concern in the 9:00 retroareolar area for which biopsy was recommended.  She was noted to have normal-appearing axillary lymph nodes on ultrasound.  She had her biopsy on June 27, 2024 and was found to have invasive lobular carcinoma, grade 1 measuring up to 3 mm that was ER greater than 95%, CA negative, HER2/gisela negative with a Ki-67 of 1%.  She has furthermore had an MRI to delineate extent of disease which took place on July 15, 2024 and confirmed a 2 x 2.1 x 1.7 cm enhancement extending to the nipple.  Central lumpectomy and sentinel lymph node biopsy was recommended and took place without complication.  She denies any fevers or chills.  She does report some swelling and discomfort in her right axillary region that has recurred following aspiration of the original seroma.  She is here today for evaluation and recommendations for further therapy.        Past Medical History:    Anxiety state    Chronic PTSD    Breast cancer (HCC)    Cancer (HCC)    Cataract    Colon cancer screening    COPD (chronic obstructive pulmonary disease)     Depression    Disorder of thyroid    Diverticulitis    Fall    Goiter    Graves disease    Heart attack (HCC)    4 stents    Heart disease    History of appendectomy    History of hysterectomy    Hypothyroid    Migraines    Ovarian mass    Pneumonia    Pneumonia, organism unspecified(486)    Post PTCA    Post traumatic stress disorder (PTSD)    Pulmonary emphysema (HCC)    Shortness of breath    Tobacco abuse    Unspecified essential hypertension    Visual impairment    glasses       Past Surgical History:   Procedure Laterality Date    Angioplasty (coronary)      With STENT    Appendectomy      Appendectomy,w othr proc      right OOPHORECTOMY    Hysterectomy      total hystero.    Lumpectomy right      Susie localization wire 1 site left (cpt=19281) Left     benign.    Susie localization wire 1 site right (cpt=19281) Right     benign.    Oophorectomy Bilateral     total hystero.    Other surgical history      Fibroadenoma    Other surgical history      Ruptured cyst- right ovary    Sent lymph node biopsy      Tonsillectomy         Gynecological History:  Pt is a   She achieved menarche at age 12   Age of Menopause: 48  Type: Medication induced  She denies any history of hormone replacement therapy   She has history of oral contraceptive use for 3 years, last in .  She denies infertility treatment to achieve pregnancy.    Medications:     letrozole 2.5 MG Oral Tab Take 1 tablet (2.5 mg total) by mouth daily. 30 tablet 6    Multiple Vitamins-Minerals (PRESERVISION AREDS 2 OR) Take by mouth.      Multiple Vitamins-Minerals (MULTI-VITAMIN/MINERALS) Oral Tab Take 1 tablet by mouth daily.      fluticasone-umeclidin-vilant (TRELEGY ELLIPTA) 200-62.5-25 MCG/ACT Inhalation Aerosol Powder, Breath Activated Inhale 1 puff into the lungs daily. 3 each 0    Budesonide-Formoterol Fumarate (SYMBICORT) 160-4.5 MCG/ACT Inhalation Aerosol Inhale 2 puffs into the lungs 2 (two) times daily. 3 each 1     [DISCONTINUED] levothyroxine (SYNTHROID) 88 MCG Oral Tab Take 1 tablet (88 mcg total) by mouth before breakfast. 90 tablet 3    Butalbital-APAP-Caffeine -40 MG Oral Tab Take 1 tablet by mouth every 6 (six) hours as needed for Pain. Take one to 2 tablets as needed 30 tablet 0    Vortioxetine HBr (TRINTELLIX OR) Take 2.5 mg by mouth every other day. 1.5mg every 4 days and 2.5mg every 4 days      nitroGLYCERIN (NITROSTAT) 0.4 MG Sublingual SL Tab Place 1 tablet (0.4 mg total) under the tongue every 5 (five) minutes as needed. 30 tablet 0    aspirin EC 81 MG Oral Tab EC Take 1 tablet (81 mg total) by mouth daily. 30 tablet 11    Metoprolol Succinate ER (TOPROL XL) 25 MG Oral Take 0.5 tablets (12.5 mg total) by mouth daily. Takes 1/2 tablet (12.5 mg) daily      simvastatin (ZOCOR) 10 MG Oral Tab Take 1 tablet (10 mg total) by mouth nightly.      alprazolam (XANAX) 0.5 MG Oral Tab Take 1 tablet (0.5 mg total) by mouth. Take one tablet night PRN up to 4 tablets daily         Allergies:    Allergies   Allergen Reactions    Codeine DIZZINESS and OTHER (SEE COMMENTS)     Migraines     Epinephrine OTHER (SEE COMMENTS)     Injection    Penicillin G CARDIAC ARREST    Sulfa Antibiotics CARDIAC ARREST    Eggs Or Egg-Derived Products OTHER (SEE COMMENTS)     Makes her mucusy     Berries UNKNOWN    Chicken Allergy      And ALL FOWL type bird    Chocolate     Clindamycin OTHER (SEE COMMENTS)     .    Dairy Products     Grass      trees    Molds & Smuts     Mushrooms     Soy [Isoflavones, Soy]     Biaxin [Clarithromycin] DIARRHEA       Family History:   Family History   Problem Relation Age of Onset    Psychiatric Mother         Severe Depression, anxiety    Cancer Mother         Heart    Other (Other) Mother         Hypothyroidism    Endocrine Disorder Father         cushing's disease    Other (Other) Father     Other (CHEK-2+) Sister     Breast Cancer Sister 65    Other (Other) Other         stroke, a-fib    Migraines Other         She is not of Ashkenazi Alev[place] ancestry.    Social History:  History   Alcohol Use Not Currently     Comment: minimal       History   Smoking Status    Every Day    Types: Cigarettes   Smokeless Tobacco    Never   Ms. Sophia Rendon is  with 0 children. She has 2 siblings. She is currently Retired    Review of Systems:  General:   The patient denies, fever, chills, night sweats, fatigue, generalized weakness, change in appetite or weight loss.    HEENT:     The patient denies eye irritation, +cataracts, redness, glaucoma, yellowing of the eyes, change in vision, color blindness, or +wearing contacts/glasses,+macular degeneration. The patient denies hearing loss, ringing in the ears, ear drainage, earaches, nasal congestion, nose bleeds, snoring, pain in mouth/throat, +hoarseness, change in voice, facial trauma.    Respiratory:  The patient denies chronic +cough, +phlegm, hemoptysis, pleurisy/chest pain, pneumonia, asthma, wheezing, difficulty in breathing with exertion, +emphysema, chronic bronchitis, shortness of breath or abnormal sound when breathing.     Cardiovascular:  There is no history of chest pain, chest pressure/discomfort, palpitations, irregular heartbeat, +fainting or near-fainting, difficulty breathing when lying flat, SOB/Coughing at night, swelling of the legs or chest pain while walking.    Breasts:  See history of present illness    Gastrointestinal:     There is no history of difficulty or pain with swallowing, reflux symptoms, vomiting, dark or bloody stools, constipation, yellowing of the skin, indigestion, nausea, change in bowel habits, diarrhea, abdominal pain or vomiting blood.     Genitourinary:  The patient denies frequent urination, +needing to get up at night to urinate, urinary hesitancy or retaining urine, painful urination, urinary incontinence, decreased urine stream, blood in the urine or vaginal/penile discharge.    Skin:    The patient denies rash, itching, skin  lesions, +dry skin, change in skin color or change in moles.     Hematologic/Lymphatic:  The patient denies easily bruising or bleeding or persistent swollen glands or lymph nodes.     Musculoskeletal:  The patient denies muscle aches/pain, joint pain, stiff joints, neck pain, back pain or bone pain.    Neuropsychiatric:  There is no history of +migraines or severe headaches, seizure/epilepsy, +speech problems, coordination problems, +trembling/tremors (PTSD), fainting/black outs, dizziness, memory problems, loss of sensation/numbness, +problems walking, weakness, tingling or burning in hands/feet. There is no history of +abusive relationship(Past), bipolar disorder, +sleep disturbance, +anxiety, +depression or +feeling of despair.    Endocrine:    There is no history of poor/slow wound healing, weight loss/gain, fertility or hormone problems, cold intolerance, +thyroid disease.     Allergic/Immunologic:  There is no history of hives, hay fever, angioedema or anaphylaxis.    /73 (BP Location: Left arm, Patient Position: Sitting, Cuff Size: adult)   Pulse 79   Temp 97.7 °F (36.5 °C) (Temporal)   Resp 17   Wt 61.1 kg (134 lb 9.6 oz)   SpO2 95%   BMI 23.47 kg/m²     Physical Exam:  The patient is an alert, oriented, well-nourished and  well-developed woman who appears her stated age. Her speech patterns and movements are normal. Her affect is appropriate.    HEENT: The head is normocephalic. The neck is supple. The thyroid is not enlarged and is without palpable masses/nodules. There are no palpable masses. The trachea is in the midline. Conjunctiva are clear, non-icteric.    Chest: The chest expands symmetrically. The lungs are clear to auscultation.    Heart: The rhythm is regular.  There are no murmurs, rubs, gallops or thrills.    Breasts:  Her breasts are symmetrical with a cup size 36DD.  Right breast: The skin, nipple ,and areola appear normal. There is no skin dimpling with movement of the  pectoralis. There is no nipple retraction. No nipple discharge can be elicited. The parenchyma is mildly nodular. There is palpable swelling with biopsy site changes in the right breast.  There is a well-healed incision in the right axilla with a small underlying seroma and well-healed incision across the central chest with light bruising.    Retroareolar area. The axillary tail is normal.  Left breast:   The skin, nipple, and areola appear normal. There is no skin dimpling with movement of the pectoralis. There is no nipple retraction. No nipple discharge can be elicited. The parenchyma is mildly nodular. There are no dominant masses in the breast. The axillary tail is normal.    Abdomen:  The abdomen is soft, flat and non tender. The liver is not enlarged. There are no palpable masses.    Lymph Nodes:  The supraclavicular, axillary and cervical regions are free of significant lymphadenopathy.    Back: There is no vertebral column tenderness.    Skin: The skin appears normal. There are no suspicious appearing rashes or lesions.    Extremities: The extremities are without deformity, cyanosis or edema.    Impression:   Ms. Sophia Rendon is a 73 year old woman presents with right breast cancer, status post central lumpectomy and sentinel lymph node biopsy.    Discussion and Plan:  I had a discussion with the Patient regarding her breast exam. On exam today I found a moderate-sized symptomatic seroma in the right axilla for which I recommended aspiration and the risks and possible complications were discussed with the patient and she agreed to proceed.      Aspiration of R axillary seroma  The right axilla was prepped and draped in usual sterile fashion.  1% lidocaine with epinephrine was used to infiltrate the targeted area.  A 22-gauge needle was inserted into the area of maximal fluctuance with successful aspiration of 20 cc serous fluid that was discarded.  She noted symptom improvement immediately.  She  tolerated this with no immediate complications and a sterile dressing was applied.    The patient will be seen in 1 week to ensure adequate healing.  She will follow with medical and radiation oncology as per above for her next steps of treatment.  I anticipate her first set of imaging surveillance will take place in approximately 6 months.   I encouraged her to continue monitoring her ROM and strength and explained that a referral to physical therapy may be warranted in the future if she identifies any limitations or restrictions. She was encouraged to contact the office with any questions or concerns prior to her next scheduled appointment.

## 2024-09-20 NOTE — PROGRESS NOTES
Nursing Consultation Note  Patient: Sophia Rendon  YOB: 1950  Age: 73 year old  Radiation Oncologist: Dr. Pawan Gomez  Referring Physician: Dr. Aimee Bennett  Diagnosis: RIGHT BREAST CA  Consult Date: 9/20/2024      Chemotherapy: N/A  Labs: Reviewed  Imaging: Reviewed  Is the patient of child-bearing age?         No  Menarche: 13 y/o  Menopause: 47 y/o  OCP use x 2 yrs in her 20's  No HRT use    Has the patient received radiation therapy in the past? no  Does the patient have an implantable device?No   Patient has/has had:     1. Assistive Devices: N/A    2. Flu Vaccination: yes    3. Pneumonia Vaccination:  yes    Vital Signs:   Vitals:    09/23/24 0939   BP: 125/76   Pulse: 73   Resp: 20   Temp: 97.6 °F (36.4 °C)   ,   Wt Readings from Last 6 Encounters:   09/23/24 60.3 kg (133 lb)   09/10/24 61.1 kg (134 lb 9.6 oz)   09/10/24 61.2 kg (135 lb)   09/03/24 61.6 kg (135 lb 12.8 oz)   08/27/24 61.5 kg (135 lb 9.6 oz)   08/16/24 60.7 kg (133 lb 12.8 oz)       Nursing Note: Presented with self-palpated R breast mass, diagnostic mammo done in June. Biopsy on 6/27/24 showed ILC gr1, ER+/LA-/Her2 not amplified. Met with Dr. Yu, lumpectomy performed on 8/16/24. Path showed ILC gr 2 with LCIS with 3/5 +LN and close lateral margin (<1mm). Oncotype sent, RS =8. Met with Dr. John, discussed endocrine therapy and referred for RT consult. Pt here alone, has history of PTSD and agoraphobia. Denies breast pain, erythema. States some fullness felt to R axilla. States has cording to RUE, will have PT evaluation on 9/27. Has good ROM to RUE with some pulling sensation. States has had 3 aspirations to R axilla, has follow-up on 9/26. Started Letrozole 1 week ago, tolerating well.         Review of Systems   Constitutional:  Positive for fatigue.        Energy level low, states has agoraphobia   HENT: Negative.     Eyes: Negative.    Respiratory:  Positive for shortness of breath.         SOB with  exertion   Cardiovascular: Negative.    Gastrointestinal: Negative.    Endocrine: Negative.    Genitourinary: Negative.    Musculoskeletal: Negative.    Skin: Negative.    Allergic/Immunologic: Negative.    Neurological:  Positive for headaches.        Hx of migraines   Hematological: Negative.    Psychiatric/Behavioral:  The patient is nervous/anxious.         Hx of agoraphobia and PTSD, sees psych          Allergies:  Allergies   Allergen Reactions    Codeine DIZZINESS and OTHER (SEE COMMENTS)     Migraines     Epinephrine OTHER (SEE COMMENTS)     Injection    Penicillin G CARDIAC ARREST    Sulfa Antibiotics CARDIAC ARREST    Eggs Or Egg-Derived Products OTHER (SEE COMMENTS)     Makes her mucusy     Berries UNKNOWN    Chicken Allergy      And ALL FOWL type bird    Chocolate     Clindamycin OTHER (SEE COMMENTS)     .    Dairy Products     Grass      trees    Molds & Smuts     Mushrooms     Soy [Isoflavones, Soy]     Biaxin [Clarithromycin] DIARRHEA       Current Outpatient Medications   Medication Sig Dispense Refill    SYNTHROID 88 MCG Oral Tab TAKE ONE TABLET BY MOUTH DAILY BEFORE breakfast 90 tablet 3    letrozole 2.5 MG Oral Tab Take 1 tablet (2.5 mg total) by mouth daily. 30 tablet 6    Multiple Vitamins-Minerals (PRESERVISION AREDS 2 OR) Take by mouth.      Multiple Vitamins-Minerals (MULTI-VITAMIN/MINERALS) Oral Tab Take 1 tablet by mouth daily.      fluticasone-umeclidin-vilant (TRELEGY ELLIPTA) 200-62.5-25 MCG/ACT Inhalation Aerosol Powder, Breath Activated Inhale 1 puff into the lungs daily. 3 each 0    Butalbital-APAP-Caffeine -40 MG Oral Tab Take 1 tablet by mouth every 6 (six) hours as needed for Pain. Take one to 2 tablets as needed 30 tablet 0    Vortioxetine HBr (TRINTELLIX OR) Take 2.5 mg by mouth every other day. 1.5mg every 4 days and 2.5mg every 4 days      aspirin EC 81 MG Oral Tab EC Take 1 tablet (81 mg total) by mouth daily. 30 tablet 11    Metoprolol Succinate ER (TOPROL XL) 25 MG  Oral Take 0.5 tablets (12.5 mg total) by mouth daily. Takes 1/2 tablet (12.5 mg) daily      simvastatin (ZOCOR) 10 MG Oral Tab Take 1 tablet (10 mg total) by mouth nightly.      alprazolam (XANAX) 0.5 MG Oral Tab Take 1 tablet (0.5 mg total) by mouth. Take one tablet night PRN up to 4 tablets daily      Budesonide-Formoterol Fumarate (SYMBICORT) 160-4.5 MCG/ACT Inhalation Aerosol Inhale 2 puffs into the lungs 2 (two) times daily. (Patient not taking: Reported on 9/23/2024) 3 each 1    nitroGLYCERIN (NITROSTAT) 0.4 MG Sublingual SL Tab Place 1 tablet (0.4 mg total) under the tongue every 5 (five) minutes as needed. (Patient not taking: Reported on 9/23/2024) 30 tablet 0       Preferred Pharmacy:    [facility] [address]  [phone], [phone]   [address] [facility] [address]  Phone: [phone] Fax: [phone]      Past Medical History:    Anxiety state    Chronic PTSD    Breast cancer (HCC)    Cancer (HCC)    Cataract    Colon cancer screening    COPD (chronic obstructive pulmonary disease)    Depression    Disorder of thyroid    Diverticulitis    Fall    Goiter    Graves disease    Heart attack (HCC)    4 stents    Heart disease    History of appendectomy    History of hysterectomy    Hypothyroid    Migraines    Ovarian mass    Pneumonia    Pneumonia, organism unspecified(486)    Post PTCA    Post traumatic stress disorder (PTSD)    Pulmonary emphysema (HCC)    Shortness of breath    Tobacco abuse    Unspecified essential hypertension    Visual impairment    glasses       Past Surgical History:   Procedure Laterality Date    Angioplasty (coronary)      With STENT    Appendectomy      Appendectomy,w othr proc      right OOPHORECTOMY    Hysterectomy  2012    total hystero.    Lumpectomy right      Susie localization wire 1 site left (cpt=19281) Left 1980    benign.    Susie localization wire 1 site right (cpt=19281) Right 1990    benign.    Oophorectomy Bilateral 2012     total hystero.    Other surgical history      Fibroadenoma    Other surgical history      Ruptured cyst- right ovary    Sent lymph node biopsy      Tonsillectomy         Social History     Socioeconomic History    Marital status:      Spouse name: Not on file    Number of children: 2    Years of education: Not on file    Highest education level: Not on file   Occupational History    Occupation:     Occupation: RETIRED   Tobacco Use    Smoking status: Every Day     Current packs/day: 1.00     Types: Cigarettes    Smokeless tobacco: Never   Vaping Use    Vaping status: Never Used   Substance and Sexual Activity    Alcohol use: Not Currently     Comment: minimal    Drug use: No    Sexual activity: Not on file   Other Topics Concern     Service Not Asked    Blood Transfusions Not Asked    Caffeine Concern No    Occupational Exposure Not Asked    Hobby Hazards Not Asked    Sleep Concern Not Asked    Stress Concern Not Asked    Weight Concern Not Asked    Special Diet Not Asked    Back Care Not Asked    Exercise Yes    Bike Helmet Not Asked    Seat Belt Not Asked    Self-Exams Not Asked   Social History Narrative    , lives with son    Has 2 adopted children - son and daughter (does not speak to daughter)     Social Determinants of Health     Financial Resource Strain: Not on file   Food Insecurity: Not on file   Transportation Needs: Not on file   Physical Activity: Inactive (12/22/2020)    Received from [facility], [facility] [place]    Exercise Vital Sign     Days of Exercise per Week: 0 days     Minutes of Exercise per Session: 0 min   Stress: Not on file   Social Connections: Not on file   Housing Stability: Not on file       ECOG:  Grade 0 - Fully active, able to carry on all predisease activities without restrictions.      Education: YES  Knowledge Deficit Plan Of Care:    Problem:  Knowledge Deficit    Problems related to:    Radiation  therapy    Interventions:  Instruct on purpose of radiation therapy    Expected Outcomes:  Knowledge of radiation therapy    Progress Toward Outcome:  Making progress    Pamphlets/Handouts Given to Patient:  Understanding radiation therapy      Are ADL's met?  Yes  Does patient feel safe in their environment?  Yes  Care decisions:  Patient and/or surrogate IS involved in care decisions.  Advanced directives:  Patient HAS advnaced directives and a copy has been requested.  Transportation:  Adequate transportation available for expected visits    Pain:   ;Pain Score: 0   ;    ;

## 2024-09-23 ENCOUNTER — HOSPITAL ENCOUNTER (OUTPATIENT)
Dept: RADIATION ONCOLOGY | Facility: HOSPITAL | Age: 74
End: 2024-09-23
Attending: RADIOLOGY
Payer: MEDICARE

## 2024-09-23 VITALS
WEIGHT: 133 LBS | RESPIRATION RATE: 20 BRPM | BODY MASS INDEX: 23.57 KG/M2 | HEIGHT: 63 IN | OXYGEN SATURATION: 95 % | HEART RATE: 73 BPM | TEMPERATURE: 98 F | DIASTOLIC BLOOD PRESSURE: 76 MMHG | SYSTOLIC BLOOD PRESSURE: 125 MMHG

## 2024-09-23 DIAGNOSIS — C50.411 CARCINOMA OF UPPER-OUTER QUADRANT OF RIGHT BREAST IN FEMALE, ESTROGEN RECEPTOR POSITIVE (HCC): Primary | ICD-10-CM

## 2024-09-23 DIAGNOSIS — Z17.0 CARCINOMA OF UPPER-OUTER QUADRANT OF RIGHT BREAST IN FEMALE, ESTROGEN RECEPTOR POSITIVE (HCC): Primary | ICD-10-CM

## 2024-09-23 PROCEDURE — 99214 OFFICE O/P EST MOD 30 MIN: CPT

## 2024-09-23 NOTE — PATIENT INSTRUCTIONS
- WE WILL CALL TO SCHEDULE YOUR CT SIMULATION FOR RADIATION PLANNING.       - IF YOU HAVE ANY QUESTIONS OR CONCERNS REGARDING RADIATION THERAPY, PLEASE CALL [phone].

## 2024-09-23 NOTE — CONSULTS
[facility]  RADIATION ONCOLOGY  CONSULTATION     PATIENT:   Sophia Rendon      REFERRING MD:  CHARMAINE Yu MD  DIAGNOSIS:   pT2 N1 right breast cancer    CC:    Discuss adjuvant RT    HPI   Here for consultation.    Presents with a palpable right breast lump.      Ha mammogram and ultrasound 6/24/2024 shows a 10 x 7 x 8 mm mass in the 9 o'clock position of the retroareolar right breast.  No axillary adenopathy seen on ultrasound.      Ultrasound-guided biopsy 6/27/2024 shows grade 1 ILC.  ER 95, TX 0, Ki-67 1%, HER2 0.    MRI 7/15/2024 shows a 2 x 2.1 x 1.7 cm area of enhancement in the 9:00 retroareolar right breast.  No axillary or internal mammary adenopathy.    Wire localized excision, shave margins, sentinel lymph node biopsy 8/16/2024 shows 3.6 cm grade 2 ILC.  Classic LCIS in the background.  No LVSI.  The lateral shave margin contains additional 3.5 mm grade 1 ILC, less than 1 mm from the final lateral surgical margin.  The other shave margins are negative.  She has 3 out of 5 sentinel nodes positive for malignancy, the largest being 12 mm with multiple foci of extranodal mary ann extension.  Oncotype DX recurrence score is 8.    She does not have arm edema but she does have some axillary cording.  She has had multiple aspiration of axillary seromas.  Therapy is scheduled later this week at [place].    She has started her aromatase inhibitor.    Her treatment is complicated by severe agoraphobia.    PMH  -Chronic PTSD, agoraphobia, COPD, depression, thyroid disease, Graves' disease, coronary disease status post 4 stents    PSH  -Coronary stents, appendectomy, right oophorectomy, hysterectomy, right breast surgery    MEDS   ALPRAZolam (XANAX)    aspirin    Budesonide-Formoterol Fumarate (SYMBICORT) 160-4.5 MCG/ACT Inhalation Aerosol    Butalbital-APAP-Caffeine -40 MG Oral Tab    fluticasone-umeclidin-vilant (TRELEGY ELLIPTA) 200-62.5-25 MCG/ACT Inhalation Aerosol Powder, Breath Activated     letrozole (FEMARA)    metoprolol succinate ER (TOPROL XL)    Multiple Vitamins-Minerals (MULTI-VITAMIN/MINERALS) Oral Tab    Multiple Vitamins-Minerals (PRESERVISION AREDS 2 OR)    nitroglycerin (NITROSTAT)    simvastatin (ZOCOR)    Synthroid    Vortioxetine HBr (TRINTELLIX OR)     SH  -Lives with her adult son, her   a few years back, smoking history    FH  -Just had genetic counseling.  Sister had breast cancer age 65.  She had a lot of radiation burns.    ROS  -pertinent items in HPI.     PHYSICAL EXAM   ECO  GENERAL: 133 pounds.  HEENT:  No lymphadenopathy.  CHEST:  Regular rate and rhythm.  ABD:  Soft, non-tender.  EXT:  No edema.  NEURO:  Alert and oriented.     IMPRESSION/PLAN   pT2 N1 grade 2 ILC right breast, 9:00 retroareolar  ER 95 DC 0 Ki-67 1% Her2 0  < 1 mm true lateral margin width  3 of 5 sentinel nodes positive, with multiple foci of GENA  No axillary dissection    Recommend adjuvant radiation therapy to the right breast and a boost to the tumor bed, along with elective mary ann radiation of the undissected axilla, supraclavicular fossa.  This is to provide optimal local and regional control of disease.  There may be a survival benefit in someone with multiple positive nodes.    There is a small risk of radiation pneumonitis.  A 5% or so risk of arm lymphedema.  No significant dose will be given to the heart.  We discussed management of acute skin and soft tissue reaction from radiation therapy.  She will work with OT on her axillary cording.  She will follow-up with breast surgery to manage her recurring axillary seromas. Will try our best working in this treatment in the setting of agoraphobia.    Will tentatively schedule her for a CT simulation on , 2 weeks from now.  If she needs more time to handle her seromas and cording, we can always reschedule.    Tentative plan:  50 Gy to breast and nodes. 10 Gy boost.    Pawan Gomez MD  Radiation Oncology    CC: MD PALAK Gutierrez  MD Herson    60 minutes were spent with the patient, more than 50 percent on counseling/coordination of care (discuss disease status, goals of therapy, methods of radiation therapy, side effect discussion, role of RT in overall care)

## 2024-09-24 ENCOUNTER — TELEPHONE (OUTPATIENT)
Dept: SURGERY | Facility: CLINIC | Age: 74
End: 2024-09-24

## 2024-09-24 ENCOUNTER — OFFICE VISIT (OUTPATIENT)
Dept: SURGERY | Facility: CLINIC | Age: 74
End: 2024-09-24
Payer: MEDICARE

## 2024-09-24 VITALS
WEIGHT: 133 LBS | OXYGEN SATURATION: 94 % | DIASTOLIC BLOOD PRESSURE: 61 MMHG | SYSTOLIC BLOOD PRESSURE: 107 MMHG | HEART RATE: 95 BPM | BODY MASS INDEX: 24 KG/M2 | RESPIRATION RATE: 18 BRPM | TEMPERATURE: 98 F

## 2024-09-24 DIAGNOSIS — N61.0 CELLULITIS OF RIGHT BREAST: Primary | ICD-10-CM

## 2024-09-24 PROCEDURE — 87070 CULTURE OTHR SPECIMN AEROBIC: CPT

## 2024-09-24 PROCEDURE — 3074F SYST BP LT 130 MM HG: CPT

## 2024-09-24 PROCEDURE — 1160F RVW MEDS BY RX/DR IN RCRD: CPT

## 2024-09-24 PROCEDURE — 87077 CULTURE AEROBIC IDENTIFY: CPT

## 2024-09-24 PROCEDURE — 87205 SMEAR GRAM STAIN: CPT

## 2024-09-24 PROCEDURE — 3078F DIAST BP <80 MM HG: CPT

## 2024-09-24 PROCEDURE — 1125F AMNT PAIN NOTED PAIN PRSNT: CPT

## 2024-09-24 PROCEDURE — 99024 POSTOP FOLLOW-UP VISIT: CPT

## 2024-09-24 PROCEDURE — 1159F MED LIST DOCD IN RCRD: CPT

## 2024-09-24 PROCEDURE — 87186 SC STD MICRODIL/AGAR DIL: CPT

## 2024-09-24 RX ORDER — LEVOFLOXACIN 750 MG/1
750 TABLET, FILM COATED ORAL DAILY
Qty: 10 TABLET | Refills: 0 | Status: SHIPPED | OUTPATIENT
Start: 2024-09-24 | End: 2024-10-04

## 2024-09-24 NOTE — PROGRESS NOTES
Breast Surgery Post-Operative Visit    Diagnosis: Right breast invasive lobular carcinoma status post central lumpectomy and sentinel lymph node biopsy on August 16, 2024.    Stage: T2 N1a MX    Disease Status:  Surgical treatment complete, medical and radiation oncology recommendations pending.    History:   This 73 year old woman presented with discomfort and a lump that recently developed in her right breast.  She does have a family history of breast cancer and her sister tested positive for a CHEK2 genetic mutation.  She has a personal history of bilateral excision of fibroadenomas.  She denies any nipple discharge, skin changes or axillary concerns.  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  She had a bilateral diagnostic evaluation given her symptoms on June 24, 2024 that confirmed a 1 cm mass at her site of palpable concern in the 9:00 retroareolar area for which biopsy was recommended.  She was noted to have normal-appearing axillary lymph nodes on ultrasound.  She had her biopsy on June 27, 2024 and was found to have invasive lobular carcinoma, grade 1 measuring up to 3 mm that was ER greater than 95%, FL negative, HER2/gisela negative with a Ki-67 of 1%.  She has furthermore had an MRI to delineate extent of disease which took place on July 15, 2024 and confirmed a 2 x 2.1 x 1.7 cm enhancement extending to the nipple.  Central lumpectomy and sentinel lymph node biopsy was recommended and took place without complication.  She started to have fevers and redness to her right breast and axilla. She is also having pain and cording to her right axilla.  She is here today for evaluation and recommendations for further therapy.        Past Medical History:    Anxiety state    Chronic PTSD    Breast cancer (HCC)    Cancer (HCC)    Cataract    Colon cancer screening    COPD (chronic obstructive pulmonary disease)    Depression    Disorder of thyroid     Diverticulitis    Fall    Goiter    Graves disease    Heart attack (HCC)    4 stents    Heart disease    History of appendectomy    History of hysterectomy    Hypothyroid    Migraines    Ovarian mass    Pneumonia    Pneumonia, organism unspecified(486)    Post PTCA    Post traumatic stress disorder (PTSD)    Pulmonary emphysema (HCC)    Shortness of breath    Tobacco abuse    Unspecified essential hypertension    Visual impairment    glasses       Past Surgical History:   Procedure Laterality Date    Angioplasty (coronary)      With STENT    Appendectomy      Appendectomy,w othr proc      right OOPHORECTOMY    Hysterectomy      total hystero.    Lumpectomy right      Susie localization wire 1 site left (cpt=19281) Left     benign.    Susie localization wire 1 site right (cpt=19281) Right     benign.    Oophorectomy Bilateral     total hystero.    Other surgical history      Fibroadenoma    Other surgical history      Ruptured cyst- right ovary    Sent lymph node biopsy      Tonsillectomy         Gynecological History:  Pt is a   She achieved menarche at age 12   Age of Menopause: 48  Type: Medication induced  She denies any history of hormone replacement therapy   She has history of oral contraceptive use for 3 years, last in .  She denies infertility treatment to achieve pregnancy.    Medications:     levoFLOXacin 750 MG Oral Tab Take 1 tablet (750 mg total) by mouth daily for 10 days. 10 tablet 0    SYNTHROID 88 MCG Oral Tab TAKE ONE TABLET BY MOUTH DAILY BEFORE breakfast 90 tablet 3    letrozole 2.5 MG Oral Tab Take 1 tablet (2.5 mg total) by mouth daily. 30 tablet 6    Multiple Vitamins-Minerals (PRESERVISION AREDS 2 OR) Take by mouth.      Multiple Vitamins-Minerals (MULTI-VITAMIN/MINERALS) Oral Tab Take 1 tablet by mouth daily.      fluticasone-umeclidin-vilant (TRELEGY ELLIPTA) 200-62.5-25 MCG/ACT Inhalation Aerosol Powder, Breath Activated Inhale 1 puff into the lungs daily. 3 each 0     Budesonide-Formoterol Fumarate (SYMBICORT) 160-4.5 MCG/ACT Inhalation Aerosol Inhale 2 puffs into the lungs 2 (two) times daily. 3 each 1    Butalbital-APAP-Caffeine -40 MG Oral Tab Take 1 tablet by mouth every 6 (six) hours as needed for Pain. Take one to 2 tablets as needed 30 tablet 0    Vortioxetine HBr (TRINTELLIX OR) Take 2.5 mg by mouth every other day. 1.5mg every 4 days and 2.5mg every 4 days      nitroGLYCERIN (NITROSTAT) 0.4 MG Sublingual SL Tab Place 1 tablet (0.4 mg total) under the tongue every 5 (five) minutes as needed. 30 tablet 0    aspirin EC 81 MG Oral Tab EC Take 1 tablet (81 mg total) by mouth daily. 30 tablet 11    Metoprolol Succinate ER (TOPROL XL) 25 MG Oral Take 0.5 tablets (12.5 mg total) by mouth daily. Takes 1/2 tablet (12.5 mg) daily      simvastatin (ZOCOR) 10 MG Oral Tab Take 1 tablet (10 mg total) by mouth nightly.      alprazolam (XANAX) 0.5 MG Oral Tab Take 1 tablet (0.5 mg total) by mouth. Take one tablet night PRN up to 4 tablets daily         Allergies:    Allergies   Allergen Reactions    Codeine DIZZINESS and OTHER (SEE COMMENTS)     Migraines     Epinephrine OTHER (SEE COMMENTS)     Injection    Penicillin G CARDIAC ARREST    Sulfa Antibiotics CARDIAC ARREST    Eggs Or Egg-Derived Products OTHER (SEE COMMENTS)     Makes her mucusy     Berries UNKNOWN    Chicken Allergy      And ALL FOWL type bird    Chocolate     Clindamycin OTHER (SEE COMMENTS)     .    Dairy Products     Grass      trees    Molds & Smuts     Mushrooms     Soy [Isoflavones, Soy]     Biaxin [Clarithromycin] DIARRHEA       Family History:   Family History   Problem Relation Age of Onset    Psychiatric Mother         Severe Depression, anxiety    Cancer Mother         Heart    Other (Other) Mother         Hypothyroidism    Endocrine Disorder Father         cushing's disease    Other (Other) Father     Other (CHEK-2+) Sister     Breast Cancer Sister 65    Other (Other) Other         stroke, a-fib     Migraines Other        She is not of Ashkenazi Ep[employer] ancestry.    Social History:  History   Alcohol Use Not Currently     Comment: minimal       History   Smoking Status    Every Day    Types: Cigarettes   Smokeless Tobacco    Never   Ms. Sophia Rendon is  with 0 children. She has 2 siblings. She is currently Retired    Review of Systems:  General:   The patient denies, fever, chills, night sweats, fatigue, generalized weakness, change in appetite or weight loss.    HEENT:     The patient denies eye irritation, +cataracts, redness, glaucoma, yellowing of the eyes, change in vision, color blindness, or +wearing contacts/glasses,+macular degeneration. The patient denies hearing loss, ringing in the ears, ear drainage, earaches, nasal congestion, nose bleeds, snoring, pain in mouth/throat, +hoarseness, change in voice, facial trauma.    Respiratory:  The patient denies chronic +cough, +phlegm, hemoptysis, pleurisy/chest pain, pneumonia, asthma, wheezing, difficulty in breathing with exertion, +emphysema, chronic bronchitis, shortness of breath or abnormal sound when breathing.     Cardiovascular:  There is no history of chest pain, chest pressure/discomfort, palpitations, irregular heartbeat, +fainting or near-fainting, difficulty breathing when lying flat, SOB/Coughing at night, swelling of the legs or chest pain while walking.    Breasts:  See history of present illness    Gastrointestinal:     There is no history of difficulty or pain with swallowing, reflux symptoms, vomiting, dark or bloody stools, constipation, yellowing of the skin, indigestion, nausea, change in bowel habits, diarrhea, abdominal pain or vomiting blood.     Genitourinary:  The patient denies frequent urination, +needing to get up at night to urinate, urinary hesitancy or retaining urine, painful urination, urinary incontinence, decreased urine stream, blood in the urine or vaginal/penile discharge.    Skin:    The patient denies rash,  itching, skin lesions, +dry skin, change in skin color or change in moles.     Hematologic/Lymphatic:  The patient denies easily bruising or bleeding or persistent swollen glands or lymph nodes.     Musculoskeletal:  The patient denies muscle aches/pain, joint pain, stiff joints, neck pain, back pain or bone pain.    Neuropsychiatric:  There is no history of +migraines or severe headaches, seizure/epilepsy, +speech problems, coordination problems, +trembling/tremors (PTSD), fainting/black outs, dizziness, memory problems, loss of sensation/numbness, +problems walking, weakness, tingling or burning in hands/feet. There is no history of +abusive relationship(Past), bipolar disorder, +sleep disturbance, +anxiety, +depression or +feeling of despair.    Endocrine:    There is no history of poor/slow wound healing, weight loss/gain, fertility or hormone problems, cold intolerance, +thyroid disease.     Allergic/Immunologic:  There is no history of hives, hay fever, angioedema or anaphylaxis.    /61 (BP Location: Left arm, Patient Position: Sitting, Cuff Size: adult)   Pulse 95   Temp 98 °F (36.7 °C) (Temporal)   Resp 18   Wt 60.3 kg (133 lb)   SpO2 94%   BMI 23.56 kg/m²     Physical Exam:  The patient is an alert, oriented, well-nourished and  well-developed woman who appears her stated age. Her speech patterns and movements are normal. Her affect is appropriate.    HEENT: The head is normocephalic. The neck is supple. The thyroid is not enlarged and is without palpable masses/nodules. There are no palpable masses. The trachea is in the midline. Conjunctiva are clear, non-icteric.    Chest: The chest expands symmetrically. The lungs are clear to auscultation.    Heart: The rhythm is regular.  There are no murmurs, rubs, gallops or thrills.    Breasts:  Her breasts are symmetrical with a cup size 36DD.  Right breast: The skin, nipple ,and areola appear normal. There is no skin dimpling with movement of the  pectoralis. There is no nipple retraction. No nipple discharge can be elicited. The parenchyma is mildly nodular. There is palpable swelling with biopsy site changes in the right breast.  There is a well-healed incision in the right axilla with a small underlying seroma and well-healed incision across the central chest with light bruising.    Retroareolar area. The axillary tail is normal.  Left breast:   The skin, nipple, and areola appear normal. There is no skin dimpling with movement of the pectoralis. There is no nipple retraction. No nipple discharge can be elicited. The parenchyma is mildly nodular. There are no dominant masses in the breast. The axillary tail is normal.    Abdomen:  The abdomen is soft, flat and non tender. The liver is not enlarged. There are no palpable masses.    Lymph Nodes:  The supraclavicular, axillary and cervical regions are free of significant lymphadenopathy.    Back: There is no vertebral column tenderness.    Skin: The skin appears normal. There are no suspicious appearing rashes or lesions.    Extremities: The extremities are without deformity, cyanosis or edema.    Impression:   Ms. Sophia Rendon is a 73 year old woman presents with right breast cancer, status post central lumpectomy and sentinel lymph node biopsy.    Discussion and Plan:  I had a discussion with the Patient regarding her breast exam. On exam today I found a symptomatic seroma in the right axilla with associated cellulitis for which I recommended aspiration and the risks and possible complications were discussed with the patient and she agreed to proceed.      Aspiration of R axillary seroma  The right axilla was prepped and draped in usual sterile fashion.  1% lidocaine with epinephrine was used to infiltrate the targeted area.  A 22-gauge needle was inserted into the area of maximal fluctuance with successful aspiration of 25 cc serous fluid that was sent for culture.      An oral antibiotic was sent to  her pharmacy. This will be titrated according to her culture results.      I encouraged her to continue monitoring her ROM and strength and explained that a referral to physical therapy may be warranted in the future if she identifies any limitations or restrictions. She was encouraged to contact the office with any questions or concerns prior to her next scheduled appointment.

## 2024-09-24 NOTE — TELEPHONE ENCOUNTER
Called patient to talk to the patient in regards to the massage patient had sent via [employer]. Patient verbalized that she is having a fever of 100 degree farenheit, and having pain in her armpit and is very uncomfortable, patient also stated that the area where she had the lymph node biopsy done is red and painful. Asked patient if she can come in the office to be seen. Patient stated she can. Patient accepted the appointment for 1pm to be seen by NP. All questions were answered to the best of my ability. Patient verbalized understanding.

## 2024-09-26 ENCOUNTER — TELEPHONE (OUTPATIENT)
Dept: PHYSICAL THERAPY | Facility: HOSPITAL | Age: 74
End: 2024-09-26

## 2024-09-26 ENCOUNTER — TELEPHONE (OUTPATIENT)
Dept: SURGERY | Facility: CLINIC | Age: 74
End: 2024-09-26

## 2024-09-27 ENCOUNTER — APPOINTMENT (OUTPATIENT)
Dept: PHYSICAL THERAPY | Facility: HOSPITAL | Age: 74
End: 2024-09-27
Payer: MEDICARE

## 2024-09-27 ENCOUNTER — TELEPHONE (OUTPATIENT)
Dept: PHYSICAL THERAPY | Facility: HOSPITAL | Age: 74
End: 2024-09-27

## 2024-09-27 NOTE — TELEPHONE ENCOUNTER
PSRs attempted to call pt (no answer), left vm encouraging pt to still come for lymph therapy unless she has a fever. Pt returned call stating she wanted to cx, pt did not have a fever in the last 24 hours but states she is in too much pain. Attempted to encourage to pt to still come for therapy (PSR relaying message to pt) stating treatment should help relieve her of some pain. Pt still declining and cx appt.

## 2024-09-30 ENCOUNTER — APPOINTMENT (OUTPATIENT)
Dept: PHYSICAL THERAPY | Facility: HOSPITAL | Age: 74
End: 2024-09-30
Payer: MEDICARE

## 2024-09-30 ENCOUNTER — OFFICE VISIT (OUTPATIENT)
Dept: PHYSICAL THERAPY | Facility: HOSPITAL | Age: 74
End: 2024-09-30
Payer: MEDICARE

## 2024-09-30 DIAGNOSIS — C50.919 INVASIVE LOBULAR CARCINOMA OF BREAST IN FEMALE (HCC): ICD-10-CM

## 2024-09-30 DIAGNOSIS — L76.82 AXILLARY WEB SYNDROME: Primary | ICD-10-CM

## 2024-09-30 DIAGNOSIS — L90.5 AXILLARY WEB SYNDROME: Primary | ICD-10-CM

## 2024-09-30 PROCEDURE — 97162 PT EVAL MOD COMPLEX 30 MIN: CPT | Performed by: PHYSICAL THERAPIST

## 2024-09-30 PROCEDURE — 97140 MANUAL THERAPY 1/> REGIONS: CPT | Performed by: PHYSICAL THERAPIST

## 2024-09-30 PROCEDURE — 97530 THERAPEUTIC ACTIVITIES: CPT | Performed by: PHYSICAL THERAPIST

## 2024-09-30 NOTE — PROGRESS NOTES
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 Pt developed infection, swelling and cording R axilla.   Pain level no pain at rest but increases to 1-2/10 R axilla w/ movement, pt reporting the pain has been decreasing      Current functional limitations  Difficulty using RUE for ADls    Sophia describes prior level of function as independent w/ ADLs   Social History:  Lives w/ family   Hand Dominance: right handed  Pt goals include less pain , less swelling, less cording     Are you being hurt, frightened, demeaned, or taken advantage of by anyone at your home or in your life?  No        Have you recently had thoughts of hurting yourself?  No    Have you tried to hurt yourself in the past?  No        Past medical history was reviewed with Sophia.   Past Medical History:    Anxiety state    Chronic PTSD    Breast cancer (HCC)    Cancer (HCC)    Cataract    Colon cancer screening    COPD (chronic obstructive pulmonary disease)    Depression    Disorder of thyroid    Diverticulitis    Fall    Goiter    Graves disease    Heart attack (HCC)    4 stents    Heart disease    History of appendectomy    History of hysterectomy    Hypothyroid    Migraines    Ovarian mass    Pneumonia    Pneumonia, organism unspecified(486)    Post PTCA    Post traumatic stress disorder (PTSD)    Pulmonary emphysema (HCC)    Shortness of breath    Tobacco abuse    Unspecified essential hypertension    Visual impairment    glasses         Precautions:  Breast cancer, recent infection  Education or treatment limitation: PTSD, anxiety, depression,  agoraphobia   Rehab Potential:good        ASSESSMENT:  Sophia presents to [facility] evaluation with swelling R breast/axilla/trunk/upper arm, cording R axilla, decrease scar mobility, Decreased ROM R shld.    Pt and therapist discussed evaluation findings, lymphedema education, POC and self care/management. Skilled Physical Therapy is medically necessary for stretching, strengthening, posture re-education,  Complete Decongestive Therapy to reduce swelling and decrease risk of infection, garment prescription , compression device prescription and education/self management.        OBJECTIVE:    9/30/2024 (Evaluation):    AROM/Strength:     9/30/2024  Right AROM 9/30/2024  Left AROM 9/30/2024  Right Strength 9/30/2024  Left Strength   Shoulder             Flexion 155 160 4/5 4/5        Abduction 160 170 4-/5 4/5        IR WFL WFL 4/5 4/5        ER 90 90 4/5 4/5         Posture: guarded posturing, holds R breast     Edema/Tissue Observations:    - swelling R breast  - redness R breast   - seroma R axilla/lateral breast  - poor scar mobility (breast and axillary)  - cording R axilla down to elbow  - slight swelling R upper arm    Trunk Measurement:  11 cm inf to sternal notch: 93.6 cm  21 cm inf to sternal notch: 98.6 cm        Stemmer's Sign: negative     Arm Volume Measurements:       9/30/2024    12:00 PM   Lymphedema Calculations   DATE MEASURED 9/30/2024   LOCATION/MEASUREMENTS RUE   PATIENT POSITION  supine   LIMB POSITION 75 degrees abd   STARTING POINT 17 cm from 3rd cuticle   RIGHT HAND VOLUME 18.3 across palm   LEFT HAND VOLUME 18.2 across palm   MEASUREMENT A 14.8   MEASUREMENT B 16.5   MEASUREMENT C 19.3   MEASUREMENT D 22.3   MEASUREMENT E 23.4   MEASUREMENT F 25.6   MEASUREMENT G 28   MEASUREMENT H 31.2   MEASUREMENT I 33.6    TOTAL VOLUME  [id number]   DATE MEASURED 9/30/2024   LOCATION/MEASUREMENTS LUE   MEASUREMENT A 14.8   MEASUREMENT B 15.9   MEASUREMENT C 19.1   MEASUREMENT D 21.8    MEASUREMENT E 23.1   MEASUREMENT F 24.3   MEASUREMENT G 28   MEASUREMENT H 30.8   MEASUREMENT I 32.5    TOTAL VOLUME  [id number]   % DIFFERENCE 4.85418             Lymphedema Life Impact Scale: Evaluation Score 9/30/2024: LLIS Score Evaluation: 17.75 % impaired. (0% is no impairment, 100% total impairment)       Today’s Treatment and Response:   Date 9/30/2024 Date: * Date: * Date: * Date: * Date: *   Visit # 1/10  Certification From: 9/30/2024  To:12/29/2024    Visit # 2/* Visit: #3/* Visit: #4/* Visit: #5/* Visit: #6/*   Manual Therapy (35 minutes)    STM R axilla/upper arm/areas of cording. 2 cavitations noted    MLD: supraclavicular, R inguinal R A-I, L axilla, R axilla, A-A, breast.     Compression:  - pt wearing compression bra  - fabricated foam compression pad to wear as tolerated R trunk/axilla            There Ex ( 5 minutes):  - instr in flex and abd ROM exercises          ThereAct (5 min):       Self-Care:  Management/Education: Explained Lymphedema; informed patient of lymphedema precautions and risk reduction practices, and importance of skin care.   Reviewed lymphatic system and what \"cording\" is                      Goals (discussed and planned with patient involvement):      To be met in 10 visits:  1) Decrease swelling R breast/trunk by a total of 3 cm to decrease risks of further infections  2) Pt to be independent with self MLD, ROM exercises, and donning/doffing compression bandages/garments to facilitate swelling reduction and to be independent at self management once discharged  3) Increase R shoulder AROM to be symmetrical to L shld AROM to improve ease of dressing/bathing/and reaching overhead  4) Increase B UE strength to at least 4+/5 to improve ease of lifting and performing household chores      PLAN:  Frequency / Duration: Patient will be seen for 1-2 x/week or a total of 10 visits over a 90 day period. Frequency will decrease as symptoms improve.     Treatment will include: Complete  Decongestive Therapy: Manual Therapy, Self-Care/Home Management Training, Therapeutic Exercise, Neuromuscular Re-education, Orthotic Management and Training        Charges: Physical Therapy Eval: Moderate Complexity, mm2, act1      Total Timed Treatment: 45 min     Total Treatment Time: 90 min     Based on clinical rationale and outcome measures, this evaluation is   evolving (mod eval: 1-2 personal factors and/or comorbidities that impact plan of care, 30 min eval, 3 or more tests/measures)        Patient/Family/Caregiver was advised of these findings, precautions, and treatment options and has agreed to actively participate in planning and for this course of care.    Thank you for your referral. Please co-sign or sign and return this letter via fax as soon as possible to [phone]. If you have any questions, please contact me at Dept: [phone]    Sincerely,  Electronically signed by therapist: Annita Nobles PT, DPT, UYEN  Physician's certification required: Yes  I certify the need for these services furnished under this plan of treatment and while under my care.    X___________________________________________________ Date____________________    Certification From: 9/30/2024  To:12/29/2024

## 2024-10-03 ENCOUNTER — TELEPHONE (OUTPATIENT)
Dept: SURGERY | Facility: CLINIC | Age: 74
End: 2024-10-03

## 2024-10-03 NOTE — TELEPHONE ENCOUNTER
Patient called to cancel her appointment for today as she was having an anxiety attack. Talk to the patient and tried calming the patient down. Asked patient if she has someone with her to support her physically and emotionally. Patient stated that she has her son at home with her and she is ok. She also stated that she has taken her medication. Patient was concerned about cancelling the appointment. Informed patient that she does not need to worry about the appointment. We will cancel her appointment for today and will see her tomorrow in the office. Patient verbalized understanding.Will call our office if have any further questions.

## 2024-10-04 ENCOUNTER — APPOINTMENT (OUTPATIENT)
Dept: PHYSICAL THERAPY | Facility: HOSPITAL | Age: 74
End: 2024-10-04
Payer: MEDICARE

## 2024-10-07 ENCOUNTER — OFFICE VISIT (OUTPATIENT)
Dept: PHYSICAL THERAPY | Facility: HOSPITAL | Age: 74
End: 2024-10-07
Payer: MEDICARE

## 2024-10-07 ENCOUNTER — TELEPHONE (OUTPATIENT)
Dept: SURGERY | Facility: CLINIC | Age: 74
End: 2024-10-07

## 2024-10-07 ENCOUNTER — TELEPHONE (OUTPATIENT)
Dept: HEMATOLOGY/ONCOLOGY | Facility: HOSPITAL | Age: 74
End: 2024-10-07

## 2024-10-07 PROCEDURE — 97110 THERAPEUTIC EXERCISES: CPT | Performed by: PHYSICAL THERAPIST

## 2024-10-07 PROCEDURE — 97140 MANUAL THERAPY 1/> REGIONS: CPT | Performed by: PHYSICAL THERAPIST

## 2024-10-07 NOTE — TELEPHONE ENCOUNTER
Called patient to check on her as to how she was doing. Patient stated that she is doing much better than before. Patient stated she does not have any more redness in her breast area, the area is slight pink in color and turns white if pressed on it and then turns back to slight pink color, denies any pain or swelling. Patient mentioned that she has  something on her tongue which was noticed by her dentist last week. Patient wanted to know if she should see ENT specialist, informed patient she can call her primary care and see what they recommend. Patient verbalized understanding. Will contact our office if have any further questions.

## 2024-10-07 NOTE — PROGRESS NOTES
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   Past Medical History:    Anxiety state    Chronic PTSD    Breast cancer (HCC)    Cancer (HCC)    Cataract    Colon cancer screening    COPD (chronic obstructive pulmonary disease)    Depression    Disorder of thyroid    Diverticulitis    Fall    Goiter    Graves disease    Heart attack (HCC)    4 stents    Heart disease    History of appendectomy    History of hysterectomy    Hypothyroid    Migraines    Ovarian mass    Pneumonia    Pneumonia, organism unspecified(486)    Post PTCA    Post traumatic stress disorder (PTSD)    Pulmonary emphysema (HCC)    Shortness of breath    Tobacco abuse    Unspecified essential hypertension    Visual impairment    glasses         Precautions:  Breast cancer, recent infection  Education or treatment limitation: PTSD, anxiety, depression, agoraphobia   Rehab Potential:good        PAIN: R axilla, upper arm 3/10 \"pinch\"    SUBJECTIVE: \"I still have the pinch\"        OBJECTIVE:  10/7/2024:  Swelling and redness R breast  Cording R axilla down to forearm   Decreased ROM right shld        9/30/2024 (Evaluation):    AROM/Strength:     9/30/2024  Right AROM 9/30/2024  Left AROM 9/30/2024  Right Strength 9/30/2024  Left Strength   Shoulder             Flexion 155 160 4/5 4/5        Abduction 160 170 4-/5 4/5        IR WFL WFL 4/5 4/5        ER 90 90 4/5 4/5         Posture: guarded posturing, holds R breast     Edema/Tissue Observations:    - swelling R breast  - redness R breast   - seroma R axilla/lateral breast  - poor scar mobility (breast and axillary)  - cording R axilla down to elbow  - slight swelling R upper arm    Trunk Measurement:  11 cm inf to sternal notch: 93.6 cm  21 cm inf to sternal notch: 98.6  cm        Stemmer's Sign: negative     Arm Volume Measurements:       9/30/2024    12:00 PM   Lymphedema Calculations   DATE MEASURED 9/30/2024   LOCATION/MEASUREMENTS RUE   PATIENT POSITION  supine   LIMB POSITION 75 degrees abd   STARTING POINT 17 cm from 3rd cuticle   RIGHT HAND VOLUME 18.3 across palm   LEFT HAND VOLUME 18.2 across palm   MEASUREMENT A 14.8   MEASUREMENT B 16.5   MEASUREMENT C 19.3   MEASUREMENT D 22.3   MEASUREMENT E 23.4   MEASUREMENT F 25.6   MEASUREMENT G 28   MEASUREMENT H 31.2   MEASUREMENT I 33.6    TOTAL VOLUME  [id number]   DATE MEASURED 9/30/2024   LOCATION/MEASUREMENTS LUE   MEASUREMENT A 14.8   MEASUREMENT B 15.9   MEASUREMENT C 19.1   MEASUREMENT D 21.8   MEASUREMENT E 23.1   MEASUREMENT F 24.3   MEASUREMENT G 28   MEASUREMENT H 30.8   MEASUREMENT I 32.5    TOTAL VOLUME  [id number]   % DIFFERENCE 4.51896             Lymphedema Life Impact Scale: Evaluation Score 9/30/2024: LLIS Score Evaluation: 17.75 % impaired. (0% is no impairment, 100% total impairment)       Today’s Treatment and Response:   Date 9/30/2024 Date: 10/7/2024 Date: * Date: * Date: * Date: *   Visit # 1/10  Certification From: 9/30/2024  To:12/29/2024    Visit # 2/10  Certification From: 9/30/2024  To:12/29/2024  Visit: #3/* Visit: #4/* Visit: #5/* Visit: #6/*   Manual Therapy (35 minutes)    STM R axilla/upper arm/areas of cording. 2 cavitations noted    MLD: supraclavicular, R inguinal R A-I, L axilla, R axilla, A-A, breast.     Compression:  - pt wearing compression bra  - fabricated foam compression pad to wear as tolerated R trunk/axilla     Manual Therapy (40 min)    STM R axilla/upper arm/areas of cording. 1 large cavitation noted    MLD: supraclavicular, R inguinal R A-I, L axilla, R axilla, A-A, breast.      Encouraged lotion to R breast to moisturize    Compression:  - pt wearing compression sports bra  - pt brought her regular bra to see if able to wear again, overall it is a good fit but not enough  compression lateral trunk- discussed to wear compression sports bra mostly but when need a \"break\", to wear her regular bra but w/ the compression pad at axilla/trunk for add'l compression         There Ex ( 5 minutes):  - instr in flex and abd ROM exercises   There Ex (10 min)  - sidely horiz abd/add x 10  - standing wall circles in flex and abd, CW/CCW x 10 each       ThereAct (5 min):       Self-Care:  Management/Education: Explained Lymphedema; informed patient of lymphedema precautions and risk reduction practices, and importance of skin care.   Reviewed lymphatic system and what \"cording\" is                    ASSESSMENT: Swelling breast decreases w/ MLD. Pt reporting less pain and less pulling after treatment       Goals (discussed and planned with patient involvement):      To be met in 10 visits:  1) Decrease swelling R breast/trunk by a total of 3 cm to decrease risks of further infections  2) Pt to be independent with self MLD, ROM exercises, and donning/doffing compression bandages/garments to facilitate swelling reduction and to be independent at self management once discharged  3) Increase R shoulder AROM to be symmetrical to L shld AROM to improve ease of dressing/bathing/and reaching overhead  4) Increase B UE strength to at least 4+/5 to improve ease of lifting and performing household chores      PLAN:  Frequency / Duration: Patient will be seen for 1-2 x/week or a total of 10 visits over a 90 day period. Frequency will decrease as symptoms improve.     Treatment will include: Complete Decongestive Therapy: Manual Therapy, Self-Care/Home Management Training, Therapeutic Exercise, Neuromuscular Re-education, Orthotic Management and Training        Charges: mm2,ex1       Total Timed Treatment: 50 min     Total Treatment Time: 50 min

## 2024-10-07 NOTE — TELEPHONE ENCOUNTER
Il calling she was at the dentist the other day and they found a red spot on the tip of her tongue. She is not sure what kind of doctor to see for this. Thank you Conchita

## 2024-10-09 ENCOUNTER — OFFICE VISIT (OUTPATIENT)
Dept: PHYSICAL THERAPY | Facility: HOSPITAL | Age: 74
End: 2024-10-09
Payer: MEDICARE

## 2024-10-09 PROCEDURE — 97140 MANUAL THERAPY 1/> REGIONS: CPT | Performed by: PHYSICAL THERAPIST

## 2024-10-09 NOTE — PROGRESS NOTES
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   Past Medical History:    Anxiety state    Chronic PTSD    Breast cancer (HCC)    Cancer (HCC)    Cataract    Colon cancer screening    COPD (chronic obstructive pulmonary disease)    Depression    Disorder of thyroid    Diverticulitis    Fall    Goiter    Graves disease    Heart attack (HCC)    4 stents    Heart disease    History of appendectomy    History of hysterectomy    Hypothyroid    Migraines    Ovarian mass    Pneumonia    Pneumonia, organism unspecified(486)    Post PTCA    Post traumatic stress disorder (PTSD)    Pulmonary emphysema (HCC)    Shortness of breath    Tobacco abuse    Unspecified essential hypertension    Visual impairment    glasses         Precautions:  Breast cancer, recent infection  Education or treatment limitation: PTSD, anxiety, depression, agoraphobia   Rehab Potential:good        PAIN: \"No pain\"      SUBJECTIVE: \"I have a tight cord that's ready\"        OBJECTIVE:  10/7/2024:  Swelling and redness R breast  Cording R axilla down to forearm   Decreased ROM right shld        9/30/2024 (Evaluation):    AROM/Strength:     9/30/2024  Right AROM 9/30/2024  Left AROM 9/30/2024  Right Strength 9/30/2024  Left Strength   Shoulder             Flexion 155 160 4/5 4/5        Abduction 160 170 4-/5 4/5        IR WFL WFL 4/5 4/5        ER 90 90 4/5 4/5         Posture: guarded posturing, holds R breast     Edema/Tissue Observations:    - swelling R breast  - redness R breast   - seroma R axilla/lateral breast  - poor scar mobility (breast and axillary)  - cording R axilla down to elbow  - slight swelling R upper arm    Trunk Measurement:  11 cm inf to sternal notch: 93.6 cm  21 cm inf to sternal notch: 98.6 cm         Stemmer's Sign: negative     Arm Volume Measurements:       9/30/2024    12:00 PM   Lymphedema Calculations   DATE MEASURED 9/30/2024   LOCATION/MEASUREMENTS RUE   PATIENT POSITION  supine   LIMB POSITION 75 degrees abd   STARTING POINT 17 cm from 3rd cuticle   RIGHT HAND VOLUME 18.3 across palm   LEFT HAND VOLUME 18.2 across palm   MEASUREMENT A 14.8   MEASUREMENT B 16.5   MEASUREMENT C 19.3   MEASUREMENT D 22.3   MEASUREMENT E 23.4   MEASUREMENT F 25.6   MEASUREMENT G 28   MEASUREMENT H 31.2   MEASUREMENT I 33.6    TOTAL VOLUME  [id number]   DATE MEASURED 9/30/2024   LOCATION/MEASUREMENTS LUE   MEASUREMENT A 14.8   MEASUREMENT B 15.9   MEASUREMENT C 19.1   MEASUREMENT D 21.8   MEASUREMENT E 23.1   MEASUREMENT F 24.3   MEASUREMENT G 28   MEASUREMENT H 30.8   MEASUREMENT I 32.5    TOTAL VOLUME  [id number]   % DIFFERENCE 4.25862             Lymphedema Life Impact Scale: Evaluation Score 9/30/2024: LLIS Score Evaluation: 17.75 % impaired. (0% is no impairment, 100% total impairment)       Today’s Treatment and Response:   Date 9/30/2024 Date: 10/7/2024 Date: 10/9/2024 Date: * Date: * Date: *   Visit # 1/10  Certification From: 9/30/2024  To:12/29/2024    Visit # 2/10  Certification From: 9/30/2024  To:12/29/2024  Visit: #3/10  Certification From: 9/30/2024  To:12/29/2024  Visit: #4/* Visit: #5/* Visit: #6/*   Manual Therapy (35 minutes)    STM R axilla/upper arm/areas of cording. 2 cavitations noted    MLD: supraclavicular, R inguinal R A-I, L axilla, R axilla, A-A, breast.     Compression:  - pt wearing compression bra  - fabricated foam compression pad to wear as tolerated R trunk/axilla     Manual Therapy (40 min)    STM R axilla/upper arm/areas of cording. 1 large cavitation noted    MLD: supraclavicular, R inguinal R A-I, L axilla, R axilla, A-A, breast.      Encouraged lotion to R breast to moisturize    Compression:  - pt wearing compression sports bra  - pt brought her regular bra to see if able to wear  again, overall it is a good fit but not enough compression lateral trunk- discussed to wear compression sports bra mostly but when need a \"break\", to wear her regular bra but w/ the compression pad at axilla/trunk for add'l compression   Manual therapy (40 min)    STM R axilla/arm, area of cording,cavitation noted    MLD: supraclavicular, R inguinal R A-I, L axilla, R axilla, A-A, breast.      Compression  - pt wearing her regular bra, reporting pain from compression sports bra. Added foam padding to R cup of bra to provide add'l compression       There Ex ( 5 minutes):  - instr in flex and abd ROM exercises   There Ex (10 min)  - sidely horiz abd/add x 10  - standing wall circles in flex and abd, CW/CCW x 10 each There ex (5 min)  - reviewed HEP from previous treatments       ThereAct (5 min):       Self-Care:  Management/Education: Explained Lymphedema; informed patient of lymphedema precautions and risk reduction practices, and importance of skin care.   Reviewed lymphatic system and what \"cording\" is                    ASSESSMENT: Less pain and ROM improving. Swelling decreasing        Goals (discussed and planned with patient involvement):      To be met in 10 visits:  1) Decrease swelling R breast/trunk by a total of 3 cm to decrease risks of further infections  2) Pt to be independent with self MLD, ROM exercises, and donning/doffing compression bandages/garments to facilitate swelling reduction and to be independent at self management once discharged  3) Increase R shoulder AROM to be symmetrical to L shld AROM to improve ease of dressing/bathing/and reaching overhead  4) Increase B UE strength to at least 4+/5 to improve ease of lifting and performing household chores      PLAN:  Frequency / Duration: Patient will be seen for 1-2 x/week or a total of 10 visits over a 90 day period. Frequency will decrease as symptoms improve.     Treatment will include: Complete Decongestive Therapy: Manual Therapy,  Self-Care/Home Management Training, Therapeutic Exercise, Neuromuscular Re-education, Orthotic Management and Training        Charges: mm3      Total Timed Treatment: 45 min     Total Treatment Time: 45  min

## 2024-10-10 ENCOUNTER — TELEPHONE (OUTPATIENT)
Age: 74
End: 2024-10-10

## 2024-10-11 ENCOUNTER — HOSPITAL ENCOUNTER (OUTPATIENT)
Dept: RADIATION ONCOLOGY | Facility: HOSPITAL | Age: 74
Discharge: HOME | End: 2024-10-11
Attending: RADIOLOGY
Payer: MEDICARE

## 2024-10-11 PROCEDURE — 77333 RADIATION TREATMENT AID(S): CPT | Performed by: RADIOLOGY

## 2024-10-11 PROCEDURE — 77290 THER RAD SIMULAJ FIELD CPLX: CPT | Performed by: RADIOLOGY

## 2024-10-14 ENCOUNTER — OFFICE VISIT (OUTPATIENT)
Dept: PHYSICAL THERAPY | Facility: HOSPITAL | Age: 74
End: 2024-10-14
Payer: MEDICARE

## 2024-10-14 PROCEDURE — 97140 MANUAL THERAPY 1/> REGIONS: CPT | Performed by: PHYSICAL THERAPIST

## 2024-10-14 NOTE — PROGRESS NOTES
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   Past Medical History:    Anxiety state    Chronic PTSD    Breast cancer (HCC)    Cancer (HCC)    Cataract    Colon cancer screening    COPD (chronic obstructive pulmonary disease)    Depression    Disorder of thyroid    Diverticulitis    Fall    Goiter    Graves disease    Heart attack (HCC)    4 stents    Heart disease    History of appendectomy    History of hysterectomy    Hypothyroid    Migraines    Ovarian mass    Pneumonia    Pneumonia, organism unspecified(486)    Post PTCA    Post traumatic stress disorder (PTSD)    Pulmonary emphysema (HCC)    Shortness of breath    Tobacco abuse    Unspecified essential hypertension    Visual impairment    glasses         Precautions:  Breast cancer, recent infection  Education or treatment limitation: PTSD, anxiety, depression, agoraphobia   Rehab Potential:good        PAIN: 1-2/10 L breast and axilla      SUBJECTIVE: \"It's really tight today, and my breast is sore\"        OBJECTIVE:  10/14/2024:  Swelling and redness R breast  Trunk Measurement:  11 cm inf to sternal notch: 93 cm  (IE:  93.6 cm)  21 cm inf to sternal notch: 96 cm (IE:  98.6 cm)    Cording R axilla extending into breast and down towards forearm     Shld AROM symmetrical/WFL bilaterally but w/ \"pulling\" on right          10/7/2024:  Swelling and redness R breast  Cording R axilla down to forearm   Decreased ROM right shld        9/30/2024 (Evaluation):    AROM/Strength:     9/30/2024  Right AROM 9/30/2024  Left AROM 9/30/2024  Right Strength 9/30/2024  Left Strength   Shoulder             Flexion 155 160 4/5 4/5        Abduction 160 170 4-/5 4/5        IR WFL WFL 4/5 4/5        ER 90 90 4/5 4/5         Posture: guarded  posturing, holds R breast     Edema/Tissue Observations:    - swelling R breast  - redness R breast   - seroma R axilla/lateral breast  - poor scar mobility (breast and axillary)  - cording R axilla down to elbow  - slight swelling R upper arm    Trunk Measurement:  11 cm inf to sternal notch: 93.6 cm  21 cm inf to sternal notch: 98.6 cm        Stemmer's Sign: negative     Arm Volume Measurements:       9/30/2024    12:00 PM   Lymphedema Calculations   DATE MEASURED 9/30/2024   LOCATION/MEASUREMENTS RUE   PATIENT POSITION  supine   LIMB POSITION 75 degrees abd   STARTING POINT 17 cm from 3rd cuticle   RIGHT HAND VOLUME 18.3 across palm   LEFT HAND VOLUME 18.2 across palm   MEASUREMENT A 14.8   MEASUREMENT B 16.5   MEASUREMENT C 19.3   MEASUREMENT D 22.3   MEASUREMENT E 23.4   MEASUREMENT F 25.6   MEASUREMENT G 28   MEASUREMENT H 31.2   MEASUREMENT I 33.6    TOTAL VOLUME  [id number]   DATE MEASURED 9/30/2024   LOCATION/MEASUREMENTS LUE   MEASUREMENT A 14.8   MEASUREMENT B 15.9   MEASUREMENT C 19.1   MEASUREMENT D 21.8   MEASUREMENT E 23.1   MEASUREMENT F 24.3   MEASUREMENT G 28   MEASUREMENT H 30.8   MEASUREMENT I 32.5    TOTAL VOLUME  1822.11279   % DIFFERENCE 4.63720             Lymphedema Life Impact Scale: Evaluation Score 9/30/2024: LLIS Score Evaluation: 17.75 % impaired. (0% is no impairment, 100% total impairment)       Today’s Treatment and Response:   Date 9/30/2024 Date: 10/7/2024 Date: 10/9/2024 Date: 10/14/2024 Date: * Date: *   Visit # 1/10  Certification From: 9/30/2024  To:12/29/2024    Visit # 2/10  Certification From: 9/30/2024  To:12/29/2024  Visit: #3/10  Certification From: 9/30/2024  To:12/29/2024  Visit: #4/10  Certification From: 9/30/2024  To:12/29/2024  Visit: #5/* Visit: #6/*   Manual Therapy (35 minutes)    STM R axilla/upper arm/areas of cording. 2 cavitations noted    MLD: supraclavicular, R inguinal R A-I, L axilla, R axilla, A-A, breast.     Compression:  - pt wearing compression  bra  - fabricated foam compression pad to wear as tolerated R trunk/axilla     Manual Therapy (40 min)    STM R axilla/upper arm/areas of cording. 1 large cavitation noted    MLD: supraclavicular, R inguinal R A-I, L axilla, R axilla, A-A, breast.      Encouraged lotion to R breast to moisturize    Compression:  - pt wearing compression sports bra  - pt brought her regular bra to see if able to wear again, overall it is a good fit but not enough compression lateral trunk- discussed to wear compression sports bra mostly but when need a \"break\", to wear her regular bra but w/ the compression pad at axilla/trunk for add'l compression   Manual therapy (40 min)    STM R axilla/arm, area of cording,cavitation noted    MLD: supraclavicular, R inguinal R A-I, L axilla, R axilla, A-A, breast.      Compression  - pt wearing her regular bra, reporting pain from compression sports bra. Added foam padding to R cup of bra to provide add'l compression  Manual therapy (45 min)    Measurements of breast/trunk    STM R axilla/arm, area of cording, several cavitations noted    MLD: supraclavicular, R inguinal R A-I, L axilla, R axilla, A-A, R breast, R UE.          Compression:  - pt wearing compression sports bra (pt reports occasionally wearing regular bra to take a \"break\" from the compression bra)        There Ex ( 5 minutes):  - instr in flex and abd ROM exercises   There Ex (10 min)  - sidely horiz abd/add x 10  - standing wall circles in flex and abd, CW/CCW x 10 each There ex (5 min)  - reviewed HEP from previous treatments       ThereAct (5 min):       Self-Care:  Management/Education: Explained Lymphedema; informed patient of lymphedema precautions and risk reduction practices, and importance of skin care.   Reviewed lymphatic system and what \"cording\" is                    ASSESSMENT:  Swelling decreasing, improved ROM.         Goals (discussed and planned with patient involvement):      To be met in 10 visits:  1) Decrease  swelling R breast/trunk by a total of 3 cm to decrease risks of further infections  2) Pt to be independent with self MLD, ROM exercises, and donning/doffing compression bandages/garments to facilitate swelling reduction and to be independent at self management once discharged  3) Increase R shoulder AROM to be symmetrical to L shld AROM to improve ease of dressing/bathing/and reaching overhead - MET  4) Increase B UE strength to at least 4+/5 to improve ease of lifting and performing household chores      PLAN:     Frequency / Duration: Patient will be seen for 1-2 x/week or a total of 10 visits over a 90 day period. Frequency will decrease as symptoms improve.     Treatment will include: Complete Decongestive Therapy: Manual Therapy, Self-Care/Home Management Training, Therapeutic Exercise, Neuromuscular Re-education, Orthotic Management and Training        Charges: mm3      Total Timed Treatment: 45 min     Total Treatment Time: 45  min

## 2024-10-16 ENCOUNTER — APPOINTMENT (OUTPATIENT)
Dept: PHYSICAL THERAPY | Facility: HOSPITAL | Age: 74
End: 2024-10-16
Payer: MEDICARE

## 2024-10-16 ENCOUNTER — TELEPHONE (OUTPATIENT)
Dept: PHYSICAL THERAPY | Facility: HOSPITAL | Age: 74
End: 2024-10-16

## 2024-10-16 PROCEDURE — 77300 RADIATION THERAPY DOSE PLAN: CPT | Performed by: RADIOLOGY

## 2024-10-16 PROCEDURE — 77295 3-D RADIOTHERAPY PLAN: CPT | Performed by: RADIOLOGY

## 2024-10-16 PROCEDURE — 77334 RADIATION TREATMENT AID(S): CPT | Performed by: RADIOLOGY

## 2024-10-17 PROCEDURE — 77307 TELETHX ISODOSE PLAN CPLX: CPT | Performed by: RADIOLOGY

## 2024-10-17 PROCEDURE — 77290 THER RAD SIMULAJ FIELD CPLX: CPT | Performed by: RADIOLOGY

## 2024-10-17 PROCEDURE — 77334 RADIATION TREATMENT AID(S): CPT | Performed by: RADIOLOGY

## 2024-10-21 ENCOUNTER — OFFICE VISIT (OUTPATIENT)
Dept: PHYSICAL THERAPY | Facility: HOSPITAL | Age: 74
End: 2024-10-21
Payer: MEDICARE

## 2024-10-21 PROCEDURE — 97140 MANUAL THERAPY 1/> REGIONS: CPT | Performed by: PHYSICAL THERAPIST

## 2024-10-21 NOTE — PROGRESS NOTES
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   Past Medical History:    Anxiety state    Chronic PTSD    Breast cancer (HCC)    Cancer (HCC)    Cataract    Colon cancer screening    COPD (chronic obstructive pulmonary disease)    Depression    Disorder of thyroid    Diverticulitis    Fall    Goiter    Graves disease    Heart attack (HCC)    4 stents    Heart disease    History of appendectomy    History of hysterectomy    Hypothyroid    Migraines    Ovarian mass    Pneumonia    Pneumonia, organism unspecified(486)    Post PTCA    Post traumatic stress disorder (PTSD)    Pulmonary emphysema (HCC)    Shortness of breath    Tobacco abuse    Unspecified essential hypertension    Visual impairment    glasses         Precautions:  Breast cancer, recent infection  Education or treatment limitation: PTSD, anxiety, depression, agoraphobia   Rehab Potential:good        PAIN: no pain       SUBJECTIVE: \"I got a large bruise from the last treatment but I don't care because it feels so much better\"        OBJECTIVE:  10/21/2024:  Swelling R breast: mild swelling, slight redness  Cording: R axilla down to elbow   Shoulder AROM: WFL, symmetrical  Bruising R forearm (from past cording that broke up last week)      Trunk Measurement:  11 cm inf to sternal notch: 91.4 cm  (IE:  93.6 cm)  21 cm inf to sternal notch: 95.5 cm (IE:  98.6 cm)        10/14/2024:  Swelling and redness R breast  Trunk Measurement:  11 cm inf to sternal notch: 93 cm  (IE:  93.6 cm)  21 cm inf to sternal notch: 96 cm (IE:  98.6 cm)    Cording R axilla extending into breast and down towards forearm     Shld AROM symmetrical/WFL bilaterally but w/ \"pulling\" on right          10/7/2024:  Swelling and redness R breast  Cording R  axilla down to forearm   Decreased ROM right shld        9/30/2024 (Evaluation):    AROM/Strength:     9/30/2024  Right AROM 9/30/2024  Left AROM 9/30/2024  Right Strength 9/30/2024  Left Strength   Shoulder             Flexion 155 160 4/5 4/5        Abduction 160 170 4-/5 4/5        IR WFL WFL 4/5 4/5        ER 90 90 4/5 4/5         Posture: guarded posturing, holds R breast     Edema/Tissue Observations:    - swelling R breast  - redness R breast   - seroma R axilla/lateral breast  - poor scar mobility (breast and axillary)  - cording R axilla down to elbow  - slight swelling R upper arm    Trunk Measurement:  11 cm inf to sternal notch: 93.6 cm  21 cm inf to sternal notch: 98.6 cm        Stemmer's Sign: negative     Arm Volume Measurements:       9/30/2024    12:00 PM   Lymphedema Calculations   DATE MEASURED 9/30/2024   LOCATION/MEASUREMENTS RUE   PATIENT POSITION  supine   LIMB POSITION 75 degrees abd   STARTING POINT 17 cm from 3rd cuticle   RIGHT HAND VOLUME 18.3 across palm   LEFT HAND VOLUME 18.2 across palm   MEASUREMENT A 14.8   MEASUREMENT B 16.5   MEASUREMENT C 19.3   MEASUREMENT D 22.3   MEASUREMENT E 23.4   MEASUREMENT F 25.6   MEASUREMENT G 28   MEASUREMENT H 31.2   MEASUREMENT I 33.6    TOTAL VOLUME  [id number]   DATE MEASURED 9/30/2024   LOCATION/MEASUREMENTS LUE   MEASUREMENT A 14.8   MEASUREMENT B 15.9   MEASUREMENT C 19.1   MEASUREMENT D 21.8   MEASUREMENT E 23.1   MEASUREMENT F 24.3   MEASUREMENT G 28   MEASUREMENT H 30.8   MEASUREMENT I 32.5    TOTAL VOLUME  [id number]   % DIFFERENCE 4.74895             Lymphedema Life Impact Scale: Evaluation Score 9/30/2024: LLIS Score Evaluation: 17.75 % impaired. (0% is no impairment, 100% total impairment)       Today’s Treatment and Response:   Date 9/30/2024 Date: 10/7/2024 Date: 10/9/2024 Date: 10/14/2024 Date: 10/21/2024 Date: *   Visit # 1/10  Certification From: 9/30/2024  To:12/29/2024    Visit # 2/10  Certification From: 9/30/2024  To:12/29/2024   Visit: #3/10  Certification From: 9/30/2024  To:12/29/2024  Visit: #4/10  Certification From: 9/30/2024  To:12/29/2024  Visit: #5/10  Certification From: 9/30/2024  To:12/29/2024  Visit: #6/*   Manual Therapy (35 minutes)    STM R axilla/upper arm/areas of cording. 2 cavitations noted    MLD: supraclavicular, R inguinal R A-I, L axilla, R axilla, A-A, breast.     Compression:  - pt wearing compression bra  - fabricated foam compression pad to wear as tolerated R trunk/axilla     Manual Therapy (40 min)    STM R axilla/upper arm/areas of cording. 1 large cavitation noted    MLD: supraclavicular, R inguinal R A-I, L axilla, R axilla, A-A, breast.      Encouraged lotion to R breast to moisturize    Compression:  - pt wearing compression sports bra  - pt brought her regular bra to see if able to wear again, overall it is a good fit but not enough compression lateral trunk- discussed to wear compression sports bra mostly but when need a \"break\", to wear her regular bra but w/ the compression pad at axilla/trunk for add'l compression   Manual therapy (40 min)    STM R axilla/arm, area of cording,cavitation noted    MLD: supraclavicular, R inguinal R A-I, L axilla, R axilla, A-A, breast.      Compression  - pt wearing her regular bra, reporting pain from compression sports bra. Added foam padding to R cup of bra to provide add'l compression  Manual therapy (45 min)    Measurements of breast/trunk    STM R axilla/arm, area of cording, several cavitations noted    MLD: supraclavicular, R inguinal R A-I, L axilla, R axilla, A-A, R breast, R UE.          Compression:  - pt wearing compression sports bra (pt reports occasionally wearing regular bra to take a \"break\" from the compression bra)    Manual therapy (45 min)    Measurements of breast/trunk    STM R axilla/arm, area of cording, several cavitations noted in the upper arm    MLD: supraclavicular, R inguinal R A-I, L axilla, R axilla, A-A, R breast, R UE.        Compression:  - fabricated add'l foam pad for R breast to compress/lift lower breast     There Ex ( 5 minutes):  - instr in flex and abd ROM exercises   There Ex (10 min)  - sidely horiz abd/add x 10  - standing wall circles in flex and abd, CW/CCW x 10 each There ex (5 min)  - reviewed HEP from previous treatments       ThereAct (5 min):       Self-Care:  Management/Education: Explained Lymphedema; informed patient of lymphedema precautions and risk reduction practices, and importance of skin care.   Reviewed lymphatic system and what \"cording\" is                    ASSESSMENT:  Swelling continues to decrease. ROM improved.        Goals (discussed and planned with patient involvement):      To be met in 10 visits:  1) Decrease swelling R breast/trunk by a total of 3 cm to decrease risks of further infections  2) Pt to be independent with self MLD, ROM exercises, and donning/doffing compression bandages/garments to facilitate swelling reduction and to be independent at self management once discharged  3) Increase R shoulder AROM to be symmetrical to L shld AROM to improve ease of dressing/bathing/and reaching overhead - MET  4) Increase B UE strength to at least 4+/5 to improve ease of lifting and performing household chores      PLAN:     Frequency / Duration: Patient will be seen for 1-2 x/week or a total of 10 visits over a 90 day period. Frequency will decrease as symptoms improve.     Treatment will include: Complete Decongestive Therapy: Manual Therapy, Self-Care/Home Management Training, Therapeutic Exercise, Neuromuscular Re-education, Orthotic Management and Training        Charges: mm3       Total Timed Treatment:  45 min     Total Treatment Time: 45  min

## 2024-10-23 ENCOUNTER — OFFICE VISIT (OUTPATIENT)
Dept: PHYSICAL THERAPY | Facility: HOSPITAL | Age: 74
End: 2024-10-23
Payer: MEDICARE

## 2024-10-23 PROCEDURE — 97530 THERAPEUTIC ACTIVITIES: CPT | Performed by: PHYSICAL THERAPIST

## 2024-10-23 PROCEDURE — 97140 MANUAL THERAPY 1/> REGIONS: CPT | Performed by: PHYSICAL THERAPIST

## 2024-10-23 NOTE — PROGRESS NOTES
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   Past Medical History:    Anxiety state    Chronic PTSD    Breast cancer (HCC)    Cancer (HCC)    Cataract    Colon cancer screening    COPD (chronic obstructive pulmonary disease)    Depression    Disorder of thyroid    Diverticulitis    Fall    Goiter    Graves disease    Heart attack (HCC)    4 stents    Heart disease    History of appendectomy    History of hysterectomy    Hypothyroid    Migraines    Ovarian mass    Pneumonia    Pneumonia, organism unspecified(486)    Post PTCA    Post traumatic stress disorder (PTSD)    Pulmonary emphysema (HCC)    Shortness of breath    Tobacco abuse    Unspecified essential hypertension    Visual impairment    glasses         Precautions:  Breast cancer, recent infection  Education or treatment limitation: PTSD, anxiety, depression, agoraphobia   Rehab Potential:good        PAIN: no pain       SUBJECTIVE: \"just a little tight\"         OBJECTIVE:  10/21/2024:  Swelling R breast: mild swelling, slight redness  Cording: R axilla down to elbow   Shoulder AROM: WFL, symmetrical  Bruising R forearm (from past cording that broke up last week)      Trunk Measurement:  11 cm inf to sternal notch: 91.4 cm  (IE:  93.6 cm)  21 cm inf to sternal notch: 95.5 cm (IE:  98.6 cm)        10/14/2024:  Swelling and redness R breast  Trunk Measurement:  11 cm inf to sternal notch: 93 cm  (IE:  93.6 cm)  21 cm inf to sternal notch: 96 cm (IE:  98.6 cm)    Cording R axilla extending into breast and down towards forearm     Shld AROM symmetrical/WFL bilaterally but w/ \"pulling\" on right          10/7/2024:  Swelling and redness R breast  Cording R axilla down to forearm   Decreased ROM right shld        9/30/2024  (Evaluation):    AROM/Strength:     9/30/2024  Right AROM 9/30/2024  Left AROM 9/30/2024  Right Strength 9/30/2024  Left Strength   Shoulder             Flexion 155 160 4/5 4/5        Abduction 160 170 4-/5 4/5        IR WFL WFL 4/5 4/5        ER 90 90 4/5 4/5         Posture: guarded posturing, holds R breast     Edema/Tissue Observations:    - swelling R breast  - redness R breast   - seroma R axilla/lateral breast  - poor scar mobility (breast and axillary)  - cording R axilla down to elbow  - slight swelling R upper arm    Trunk Measurement:  11 cm inf to sternal notch: 93.6 cm  21 cm inf to sternal notch: 98.6 cm        Stemmer's Sign: negative     Arm Volume Measurements:       9/30/2024    12:00 PM   Lymphedema Calculations   DATE MEASURED 9/30/2024   LOCATION/MEASUREMENTS RUE   PATIENT POSITION  supine   LIMB POSITION 75 degrees abd   STARTING POINT 17 cm from 3rd cuticle   RIGHT HAND VOLUME 18.3 across palm   LEFT HAND VOLUME 18.2 across palm   MEASUREMENT A 14.8   MEASUREMENT B 16.5   MEASUREMENT C 19.3   MEASUREMENT D 22.3   MEASUREMENT E 23.4   MEASUREMENT F 25.6   MEASUREMENT G 28   MEASUREMENT H 31.2   MEASUREMENT I 33.6    TOTAL VOLUME  [id number]   DATE MEASURED 9/30/2024   LOCATION/MEASUREMENTS LUE   MEASUREMENT A 14.8   MEASUREMENT B 15.9   MEASUREMENT C 19.1   MEASUREMENT D 21.8   MEASUREMENT E 23.1   MEASUREMENT F 24.3   MEASUREMENT G 28   MEASUREMENT H 30.8   MEASUREMENT I 32.5    TOTAL VOLUME  [id number]   % DIFFERENCE 4.70441             Lymphedema Life Impact Scale: Evaluation Score 9/30/2024: LLIS Score Evaluation: 17.75 % impaired. (0% is no impairment, 100% total impairment)       Today’s Treatment and Response:   Date: 10/14/2024 Date: 10/21/2024 Date: 10/23/2024   Visit: #4/10  Certification From: 9/30/2024  To:12/29/2024  Visit: #5/10  Certification From: 9/30/2024  To:12/29/2024  Visit: #6/10  Certification From: 9/30/2024  To:12/29/2024    Manual therapy (45 min)    Measurements of  breast/trunk    STM R axilla/arm, area of cording, several cavitations noted    MLD: supraclavicular, R inguinal R A-I, L axilla, R axilla, A-A, R breast, R UE.          Compression:  - pt wearing compression sports bra (pt reports occasionally wearing regular bra to take a \"break\" from the compression bra)    Manual therapy (45 min)    Measurements of breast/trunk    STM R axilla/arm, area of cording, several cavitations noted in the upper arm    MLD: supraclavicular, R inguinal R A-I, L axilla, R axilla, A-A, R breast, R UE.       Compression:  - fabricated add'l foam pad for R breast to compress/lift lower breast  Manual therapy (55 min)    STM R axilla/arm, (no significant cording noted but areas of scar tissue from past cording palpable)    MLD: supraclavicular, R inguinal R A-I, L axilla, R axilla, A-A, R breast, R UE.       Compression:  - pt wearing compression pads in R bra  - donned sample compression sleeve, pt w/ poor tolerance to 20-30 mmhg  - measured for compression sleeve and gauntlet   Palm: 19 cm  Wrist: 15.6 cm  Elbow: 23.3 cm  Upper: 30 cm  Length: 40 cm    Recommended Juzo soft sleeve 15-20 mmhg size 1max, size small gauntlet   (Info on [email or web address] given)         There Act (10 min)  - discussed benefits of compression sleeve/prophylactic                  ASSESSMENT:  No cording noted, less swelling. Due to + lymph nodes and needing radiation, pt would benefit from prophylactic compression sleeve and gauntlet       Goals (discussed and planned with patient involvement):      To be met in 10 visits:  1) Decrease swelling R breast/trunk by a total of 3 cm to decrease risks of further infections  2) Pt to be independent with self MLD, ROM exercises, and donning/doffing compression bandages/garments to facilitate swelling reduction and to be independent at self management once discharged  3) Increase R shoulder AROM to be symmetrical to L shld AROM to improve ease of dressing/bathing/and  reaching overhead - MET  4) Increase B UE strength to at least 4+/5 to improve ease of lifting and performing household chores      PLAN:     Frequency / Duration: Patient will be seen for 1-2 x/week or a total of 10 visits over a 90 day period. Frequency will decrease as symptoms improve.     Treatment will include: Complete Decongestive Therapy: Manual Therapy, Self-Care/Home Management Training, Therapeutic Exercise, Neuromuscular Re-education, Orthotic Management and Training        Charges: mm3, act1      Total Timed Treatment:  65 min     Total Treatment Time: 65  min

## 2024-10-28 ENCOUNTER — HOSPITAL ENCOUNTER (OUTPATIENT)
Dept: BONE DENSITY | Age: 74
Discharge: HOME | End: 2024-10-28
Attending: INTERNAL MEDICINE
Payer: MEDICARE

## 2024-10-28 DIAGNOSIS — M89.9 BONE DISORDER: ICD-10-CM

## 2024-10-28 DIAGNOSIS — Z78.0 POSTMENOPAUSAL: ICD-10-CM

## 2024-10-28 PROCEDURE — 77080 DXA BONE DENSITY AXIAL: CPT | Performed by: INTERNAL MEDICINE

## 2024-10-29 ENCOUNTER — OFFICE VISIT (OUTPATIENT)
Dept: PHYSICAL THERAPY | Facility: HOSPITAL | Age: 74
End: 2024-10-29
Payer: MEDICARE

## 2024-10-29 ENCOUNTER — OFFICE VISIT (OUTPATIENT)
Dept: FAMILY MEDICINE CLINIC | Facility: CLINIC | Age: 74
End: 2024-10-29
Payer: MEDICARE

## 2024-10-29 VITALS
SYSTOLIC BLOOD PRESSURE: 118 MMHG | RESPIRATION RATE: 16 BRPM | DIASTOLIC BLOOD PRESSURE: 76 MMHG | HEIGHT: 63 IN | WEIGHT: 132 LBS | OXYGEN SATURATION: 96 % | BODY MASS INDEX: 23.39 KG/M2 | HEART RATE: 78 BPM

## 2024-10-29 DIAGNOSIS — J43.2 CENTRILOBULAR EMPHYSEMA (HCC): ICD-10-CM

## 2024-10-29 DIAGNOSIS — J44.9 CHRONIC OBSTRUCTIVE PULMONARY DISEASE, UNSPECIFIED COPD TYPE (HCC): ICD-10-CM

## 2024-10-29 DIAGNOSIS — Z72.0 TOBACCO ABUSE: ICD-10-CM

## 2024-10-29 DIAGNOSIS — E03.8 OTHER SPECIFIED HYPOTHYROIDISM: ICD-10-CM

## 2024-10-29 DIAGNOSIS — F33.1 MODERATE RECURRENT MAJOR DEPRESSION (HCC): Chronic | ICD-10-CM

## 2024-10-29 DIAGNOSIS — I70.0 THORACIC AORTA ATHEROSCLEROSIS (HCC): ICD-10-CM

## 2024-10-29 DIAGNOSIS — Z79.02 PLATELET INHIBITION DUE TO PLAVIX: ICD-10-CM

## 2024-10-29 DIAGNOSIS — I25.10 CORONARY ARTERY DISEASE INVOLVING NATIVE HEART, UNSPECIFIED VESSEL OR LESION TYPE, UNSPECIFIED WHETHER ANGINA PRESENT: ICD-10-CM

## 2024-10-29 DIAGNOSIS — G43.109 MIGRAINE WITH AURA AND WITHOUT STATUS MIGRAINOSUS, NOT INTRACTABLE: ICD-10-CM

## 2024-10-29 DIAGNOSIS — E55.9 VITAMIN D DEFICIENCY: ICD-10-CM

## 2024-10-29 DIAGNOSIS — K80.20 SYMPTOMATIC CHOLELITHIASIS: ICD-10-CM

## 2024-10-29 DIAGNOSIS — E78.5 HYPERLIPIDEMIA, UNSPECIFIED HYPERLIPIDEMIA TYPE: ICD-10-CM

## 2024-10-29 DIAGNOSIS — I20.89 STABLE ANGINA (HCC): ICD-10-CM

## 2024-10-29 DIAGNOSIS — E04.9 GOITER: ICD-10-CM

## 2024-10-29 DIAGNOSIS — Z00.00 ENCOUNTER FOR ANNUAL HEALTH EXAMINATION: Primary | ICD-10-CM

## 2024-10-29 DIAGNOSIS — D58.2 ELEVATED HEMOGLOBIN (HCC): ICD-10-CM

## 2024-10-29 DIAGNOSIS — C50.919 INVASIVE LOBULAR CARCINOMA OF BREAST IN FEMALE (HCC): ICD-10-CM

## 2024-10-29 DIAGNOSIS — F41.9 ANXIETY: ICD-10-CM

## 2024-10-29 DIAGNOSIS — E53.8 B12 DEFICIENCY: ICD-10-CM

## 2024-10-29 DIAGNOSIS — K57.10 DIVERTICULOSIS OF SMALL INTESTINE WITHOUT HEMORRHAGE: ICD-10-CM

## 2024-10-29 PROCEDURE — 97140 MANUAL THERAPY 1/> REGIONS: CPT | Performed by: PHYSICAL THERAPIST

## 2024-10-29 RX ORDER — ERGOCALCIFEROL 1.25 MG/1
50000 CAPSULE, LIQUID FILLED ORAL WEEKLY
Qty: 12 CAPSULE | Refills: 1 | Status: SHIPPED | OUTPATIENT
Start: 2024-10-29

## 2024-10-29 NOTE — PROGRESS NOTES
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   Past Medical History:    Anxiety state    Chronic PTSD    Breast cancer (HCC)    Cancer (HCC)    Cataract    Colon cancer screening    COPD (chronic obstructive pulmonary disease)    Depression    Disorder of thyroid    Diverticulitis    Fall    Goiter    Graves disease    Heart attack (HCC)    4 stents    Heart disease    History of appendectomy    History of hysterectomy    Hypothyroid    Migraines    Ovarian mass    Pneumonia    Pneumonia, organism unspecified(486)    Post PTCA    Post traumatic stress disorder (PTSD)    Pulmonary emphysema (HCC)    Shortness of breath    Tobacco abuse    Unspecified essential hypertension    Visual impairment    glasses         Precautions:  Breast cancer, recent infection  Education or treatment limitation: PTSD, anxiety, depression, agoraphobia   Rehab Potential:good        PAIN: 1-2/10 down R arm to wrist       SUBJECTIVE: \"Saturday my arm hurt a lot to raise it up, but I did the exercises and it got better but I can still feel it\"         OBJECTIVE:  10/29/2024:  B shld AROM WFL, symmetrical but \"pulling\"  Cording R axilla down to wrist  Swelling R breast w/ slight redness (but significantly improved)          10/21/2024:  Swelling R breast: mild swelling, slight redness  Cording: R axilla down to elbow   Shoulder AROM: WFL, symmetrical  Bruising R forearm (from past cording that broke up last week)      Trunk Measurement:  11 cm inf to sternal notch: 91.4 cm  (IE:  93.6 cm)  21 cm inf to sternal notch: 95.5 cm (IE:  98.6 cm)        10/14/2024:  Swelling and redness R breast  Trunk Measurement:  11 cm inf to sternal notch: 93 cm  (IE:  93.6 cm)  21 cm inf to sternal notch: 96 cm (IE:  98.6  cm)    Cording R axilla extending into breast and down towards forearm     Shld AROM symmetrical/WFL bilaterally but w/ \"pulling\" on right          10/7/2024:  Swelling and redness R breast  Cording R axilla down to forearm   Decreased ROM right shld        9/30/2024 (Evaluation):    AROM/Strength:     9/30/2024  Right AROM 9/30/2024  Left AROM 9/30/2024  Right Strength 9/30/2024  Left Strength   Shoulder             Flexion 155 160 4/5 4/5        Abduction 160 170 4-/5 4/5        IR WFL WFL 4/5 4/5        ER 90 90 4/5 4/5         Posture: guarded posturing, holds R breast     Edema/Tissue Observations:    - swelling R breast  - redness R breast   - seroma R axilla/lateral breast  - poor scar mobility (breast and axillary)  - cording R axilla down to elbow  - slight swelling R upper arm    Trunk Measurement:  11 cm inf to sternal notch: 93.6 cm  21 cm inf to sternal notch: 98.6 cm        Stemmer's Sign: negative     Arm Volume Measurements:       9/30/2024    12:00 PM   Lymphedema Calculations   DATE MEASURED 9/30/2024   LOCATION/MEASUREMENTS RUE   PATIENT POSITION  supine   LIMB POSITION 75 degrees abd   STARTING POINT 17 cm from 3rd cuticle   RIGHT HAND VOLUME 18.3 across palm   LEFT HAND VOLUME 18.2 across palm   MEASUREMENT A 14.8   MEASUREMENT B 16.5   MEASUREMENT C 19.3   MEASUREMENT D 22.3   MEASUREMENT E 23.4   MEASUREMENT F 25.6   MEASUREMENT G 28   MEASUREMENT H 31.2   MEASUREMENT I 33.6    TOTAL VOLUME  [id number]   DATE MEASURED 9/30/2024   LOCATION/MEASUREMENTS LUE   MEASUREMENT A 14.8   MEASUREMENT B 15.9   MEASUREMENT C 19.1   MEASUREMENT D 21.8   MEASUREMENT E 23.1   MEASUREMENT F 24.3   MEASUREMENT G 28   MEASUREMENT H 30.8   MEASUREMENT I 32.5    TOTAL VOLUME  [id number]   % DIFFERENCE 4.53383             Lymphedema Life Impact Scale: Evaluation Score 9/30/2024: LLIS Score Evaluation: 17.75 % impaired. (0% is no impairment, 100% total impairment)       Today’s Treatment and Response:   Date:  10/14/2024 Date: 10/21/2024 Date: 10/23/2024 10/29/2024   Visit: #4/10  Certification From: 9/30/2024  To:12/29/2024  Visit: #5/10  Certification From: 9/30/2024  To:12/29/2024  Visit: #6/10  Certification From: 9/30/2024  To:12/29/2024  Visit: #7/10  Certification From: 9/30/2024  To:12/29/2024    Manual therapy (45 min)    Measurements of breast/trunk    STM R axilla/arm, area of cording, several cavitations noted    MLD: supraclavicular, R inguinal R A-I, L axilla, R axilla, A-A, R breast, R UE.          Compression:  - pt wearing compression sports bra (pt reports occasionally wearing regular bra to take a \"break\" from the compression bra)    Manual therapy (45 min)    Measurements of breast/trunk    STM R axilla/arm, area of cording, several cavitations noted in the upper arm    MLD: supraclavicular, R inguinal R A-I, L axilla, R axilla, A-A, R breast, R UE.       Compression:  - fabricated add'l foam pad for R breast to compress/lift lower breast  Manual therapy (55 min)    STM R axilla/arm, (no significant cording noted but areas of scar tissue from past cording palpable)    MLD: supraclavicular, R inguinal R A-I, L axilla, R axilla, A-A, R breast, R UE.       Compression:  - pt wearing compression pads in R bra  - donned sample compression sleeve, pt w/ poor tolerance to 20-30 mmhg  - measured for compression sleeve and gauntlet   Palm: 19 cm  Wrist: 15.6 cm  Elbow: 23.3 cm  Upper: 30 cm  Length: 40 cm    Recommended Juzo soft sleeve 15-20 mmhg size 1max, size small gauntlet   (Info on [email or web address] given)     Manual therapy (45 min)      STM R axilla/arm/area of cording. Several cavitations noted    MLD: supraclavicular, R inguinal R A-I, L axilla, R axilla, A-A, R breast, R UE.       Compression:  - pt wearing compression pads in R bra  - pt stating she will order compression sleeve next month after her next social security check      There Act (10 min)  - discussed benefits of compression  sleeve/prophylactic                    ASSESSMENT:  Overall swelling decreasing, cording decreased after treatment- pt reporting pain resolved       Goals (discussed and planned with patient involvement):      To be met in 10 visits:  1) Decrease swelling R breast/trunk by a total of 3 cm to decrease risks of further infections  2) Pt to be independent with self MLD, ROM exercises, and donning/doffing compression bandages/garments to facilitate swelling reduction and to be independent at self management once discharged  3) Increase R shoulder AROM to be symmetrical to L shld AROM to improve ease of dressing/bathing/and reaching overhead - MET  4) Increase B UE strength to at least 4+/5 to improve ease of lifting and performing household chores      PLAN:     Frequency / Duration: Patient will be seen for 1-2 x/week or a total of 10 visits over a 90 day period. Frequency will decrease as symptoms improve.     Treatment will include: Complete Decongestive Therapy: Manual Therapy, Self-Care/Home Management Training, Therapeutic Exercise, Neuromuscular Re-education, Orthotic Management and Training        Charges mm3       Total Timed Treatment:  45 min     Total Treatment Time: 45  min

## 2024-10-30 ENCOUNTER — HOSPITAL ENCOUNTER (OUTPATIENT)
Dept: RADIATION ONCOLOGY | Facility: HOSPITAL | Age: 74
Discharge: HOME | End: 2024-10-30
Attending: RADIOLOGY
Payer: MEDICARE

## 2024-10-30 PROCEDURE — 77280 THER RAD SIMULAJ FIELD SMPL: CPT | Performed by: RADIOLOGY

## 2024-10-30 PROCEDURE — 77412 RADIATION TX DELIVERY LVL 3: CPT | Performed by: RADIOLOGY

## 2024-10-31 ENCOUNTER — HOSPITAL ENCOUNTER (OUTPATIENT)
Dept: RADIATION ONCOLOGY | Facility: HOSPITAL | Age: 74
Discharge: HOME | End: 2024-10-31
Attending: RADIOLOGY
Payer: MEDICARE

## 2024-10-31 DIAGNOSIS — Z17.0 CARCINOMA OF UPPER-OUTER QUADRANT OF RIGHT BREAST IN FEMALE, ESTROGEN RECEPTOR POSITIVE (HCC): Primary | ICD-10-CM

## 2024-10-31 DIAGNOSIS — C50.411 CARCINOMA OF UPPER-OUTER QUADRANT OF RIGHT BREAST IN FEMALE, ESTROGEN RECEPTOR POSITIVE (HCC): Primary | ICD-10-CM

## 2024-10-31 PROCEDURE — 77412 RADIATION TX DELIVERY LVL 3: CPT | Performed by: RADIOLOGY

## 2024-10-31 PROCEDURE — 77387 GUIDANCE FOR RADJ TX DLVR: CPT | Performed by: RADIOLOGY

## 2024-10-31 NOTE — PROGRESS NOTES
[facility] Radiation Treatment Management Note 1-5    Patient:  Sophia Rendon  Age:  73 year old  Visit Diagnosis:    1. 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 Pawan Gomez    Site Delivered Dose (cGy) Prescribed Dose (cGy) Fraction #   RIGHT BREAST &  5000 2/25   R BREAST BOOST 0 1000 0/5     First treatment date:   10/30/24  Concurrent chemotherapy:  N/A        9/23/2024     9:39 AM 9/24/2024     1:02 PM 10/29/2024    11:09 AM   Oncology Vitals   Height 5' 3\"  5' 3\"   Height 160 cm  160 cm   Weight 133 lb 133 lb 132 lb   Weight 60.328 kg 60.328 kg 59.875 kg   BSA (m2) 1.63 m2 1.63 m2 1.62 m2   BMI 23.56 kg/m2 23.56 kg/m2 23.38 kg/m2   /76 107/61 118/76   Pulse 73 95 78   Resp 20 18 16   Temp 97.6 °F (36.4 °C) 98 °F (36.7 °C)    SpO2 95 % 94 % 96 %   Pain Score 0 7         Toxicities:  Fatigue Grade 0= None  Pruritis Grade 0= None  Dry Skin absent  Dermatitis associated with radiation Grade 0= None  Moisturizer used Vanicream and Aquaphor applied Number of times: 2 times daily.  Hyperpigmentation absent    Nursing Note:  RN ED done with pt:   Reviewed potential side effects of RT and management.  Instructed on skin care, samples given.  Does PT weekly for cording; will order her sleeve and gauntlet to prevent lymphedema.  Pt has agoraphobia, states having a hard time coming in daily for tx.    Suyapa COBURN RN MD NOTE   Reviewed and agree with RN note above.  Setup imaging reviewed in [facility]A and approved.    S:  -doing her best getting to appointments    O:  -no skin changes    A/P:  -PT for cording  Cont RT  OTV in 1 week    Pawan Gomez MD  Radiation Oncology

## 2024-11-01 ENCOUNTER — HOSPITAL ENCOUNTER (OUTPATIENT)
Dept: RADIATION ONCOLOGY | Facility: HOSPITAL | Age: 74
Discharge: HOME | End: 2024-11-01
Attending: RADIOLOGY
Payer: MEDICARE

## 2024-11-01 PROCEDURE — 77387 GUIDANCE FOR RADJ TX DLVR: CPT | Performed by: RADIOLOGY

## 2024-11-01 PROCEDURE — 77412 RADIATION TX DELIVERY LVL 3: CPT | Performed by: RADIOLOGY

## 2024-11-01 NOTE — PROGRESS NOTES
Subjective:   Sophia Rendon is a 73 year old female who presents for a MA AHA (Medicare Advantage Annual Health Assessment) and Subsequent Annual Wellness visit (Pt already had Initial Annual Wellness) and scheduled follow up of multiple significant but stable problems.   Pt also here with    Vitamin D deficiency    Tobacco abuse    Goiter    Platelet inhibition due to Plavix    Hypothyroidism    Anxiety    Stable angina (HCC)    Chronic obstructive pulmonary disease (HCC)    Moderate recurrent major depression (HCC)    Coronary artery disease involving native heart    Thoracic aorta atherosclerosis (HCC)    Centrilobular emphysema (HCC)    Migraine with aura and without status migrainosus, not intractable    Diverticulosis of intestine without bleeding    Symptomatic cholelithiasis    Invasive lobular carcinoma of breast in female (HCC)    Elevated hemoglobin (HCC)         History/Other:   Fall Risk Assessment:   She has been screened for Falls and is low risk.      Cognitive Assessment:   She had a completely normal cognitive assessment - see flowsheet entries     Functional Ability/Status:   Sophia Rendon has some abnormal functions as listed below:  She has Vision problems based on screening of functional status.       Depression Screening (PHQ):  PHQ-2 SCORE: 0  , done 10/31/2024   Trouble falling or staying asleep, or sleeping too much: 3 (falling and staying asleep)     Feeling tired or having little energy: 3    Poor appetite or overeatin (poor appetite)    Feeling bad about yourself - or that you are a failure or have let yourself or your family down: 2    Trouble concentrating on things, such as reading the newspaper or watching television: 3    Moving or speaking so slowly that other people could have noticed. Or the opposite - being so fidgety or restless that you have been moving around a lot more than usual: 1    If you checked off any problems, how difficult have these problems made it for you  to do your work, take care of things at home, or get along with other people?: Very difficult    Thoughts that you would be better off dead, or of hurting yourself in some way: 1        Advanced Directives:   She does have a Living Will but we do NOT have it on file in Epic.    She has a Power of  for Health Care on file in [facility].  Discussed Advance Care Planning with patient (and family/surrogate if present). Standard forms made available to patient in After Visit Summary.      Patient Active Problem List   Diagnosis    Vitamin D deficiency- stable     Tobacco abuse- stable     Goiter- stable     Platelet inhibition due to Plavix- stable     Hypothyroidism- stable     Anxiety- stable     Stable angina (HCC)- stable     Chronic obstructive pulmonary disease (HCC)- stable     Moderate recurrent major depression (HCC)- stable     Coronary artery disease involving native heart- stable     Thoracic aorta atherosclerosis (HCC)- stable     Centrilobular emphysema (HCC)- stable     Migraine with aura and without status migrainosus, not intractable- stable     Diverticulosis of intestine without bleeding- stable     Symptomatic cholelithiasis- stable     Invasive lobular carcinoma of breast in female (HCC)- stable     Elevated hemoglobin (HCC)- stable      Allergies:  She is allergic to codeine; epinephrine; penicillin g; sulfa antibiotics; eggs or egg-derived products; berries; chicken allergy; chocolate; clindamycin; dairy products; grass; molds & smuts; mushrooms; soy [isoflavones, soy]; and biaxin [clarithromycin].    Current Medications:  Outpatient Medications Marked as Taking for the 10/29/24 encounter (Office Visit) with Mallory Johnson DO   Medication Sig    ergocalciferol 1.25 MG (52232 UT) Oral Cap Take 1 capsule (50,000 Units total) by mouth once a week.    SYNTHROID 88 MCG Oral Tab TAKE ONE TABLET BY MOUTH DAILY BEFORE breakfast    letrozole 2.5 MG Oral Tab Take 1 tablet (2.5 mg total) by mouth daily.     Multiple Vitamins-Minerals (PRESERVISION AREDS 2 OR) Take by mouth.    Multiple Vitamins-Minerals (MULTI-VITAMIN/MINERALS) Oral Tab Take 1 tablet by mouth daily.    fluticasone-umeclidin-vilant (TRELEGY ELLIPTA) 200-62.5-25 MCG/ACT Inhalation Aerosol Powder, Breath Activated Inhale 1 puff into the lungs daily.    Budesonide-Formoterol Fumarate (SYMBICORT) 160-4.5 MCG/ACT Inhalation Aerosol Inhale 2 puffs into the lungs 2 (two) times daily.    Butalbital-APAP-Caffeine -40 MG Oral Tab Take 1 tablet by mouth every 6 (six) hours as needed for Pain. Take one to 2 tablets as needed    Vortioxetine HBr (TRINTELLIX OR) Take 2.5 mg by mouth every other day. 1.5mg every 4 days and 2.5mg every 4 days    nitroGLYCERIN (NITROSTAT) 0.4 MG Sublingual SL Tab Place 1 tablet (0.4 mg total) under the tongue every 5 (five) minutes as needed.    aspirin EC 81 MG Oral Tab EC Take 1 tablet (81 mg total) by mouth daily.    Metoprolol Succinate ER (TOPROL XL) 25 MG Oral Take 0.5 tablets (12.5 mg total) by mouth daily. Takes 1/2 tablet (12.5 mg) daily    simvastatin (ZOCOR) 10 MG Oral Tab Take 1 tablet (10 mg total) by mouth nightly.    alprazolam (XANAX) 0.5 MG Oral Tab Take 1 tablet (0.5 mg total) by mouth. Take one tablet night PRN up to 4 tablets daily       Medical History:  She  has a past medical history of Anxiety state, Breast cancer (HCC), Cancer (HCC), Cataract, Colon cancer screening (12/23/2013), COPD (chronic obstructive pulmonary disease) (05/07/2014), Depression, Disorder of thyroid, Diverticulitis, Fall (11/15/2012), Goiter (12/23/2013), Graves disease, Heart attack (HCC), Heart disease, History of appendectomy (08/08/2014), History of hysterectomy (08/08/2014), Hypothyroid, Migraines, Ovarian mass, Pneumonia, Pneumonia, organism unspecified(486), Post PTCA (11/06/2014), Post traumatic stress disorder (PTSD), Pulmonary emphysema (HCC), Shortness of breath, Tobacco abuse (12/23/2013), Unspecified essential hypertension,  and Visual impairment.  Surgical History:  She  has a past surgical history that includes tonsillectomy; angioplasty (coronary); appendectomy,w othr proc; other surgical history; other surgical history; appendectomy; dawood localization wire 1 site left (cpt=19281) (Left, 1980); hysterectomy (2012); oophorectomy (Bilateral, 2012); dawood localization wire 1 site right (cpt=19281) (Right, 1990); lumpectomy right; and sent lymph node biopsy.   Family History:  Her family history includes Breast Cancer (age of onset: 65) in her sister; CHEK-2+ in her sister; Cancer in her mother; Endocrine Disorder in her father; Migraines in an other family member; Other in her father, mother, and another family member; Psychiatric in her mother.  Social History:  She  reports that she has been smoking cigarettes. She has never used smokeless tobacco. She reports that she does not currently use alcohol. She reports that she does not use drugs.    Tobacco:  Social History     Tobacco Use   Smoking Status Every Day    Current packs/day: 1.00    Types: Cigarettes   Smokeless Tobacco Never     E-Cigarettes/Vaping       Questions Responses    E-Cigarette Use Never User                 CAGE Alcohol Screen:   CAGE screening score of 0 on 10/24/2024, showing low risk of alcohol abuse.    Patient Care Team:  Mallory Johnson DO as PCP - General (Family Practice)  Eveline Robertson MD as Consulting Physician (Cardiovascular Diseases)  Rick Fernandes (Psychiatry)  Tamica Mejía, RN as Registered Nurse (Registered Nurse)  Laly Sorenson, RN as Registered Nurse (Registered Nurse)  Annita Nobles PT as Physical Therapist (Physical Therapy)  Pawan Gomez MD (Radiation Oncology)  Nathaliat, Generic Provider    Review of Systems  GENERAL: feels well otherwise  SKIN: denies any unusual skin lesions  EYES: denies blurred vision or double vision  HEENT: denies nasal congestion, sinus pain or ST  LUNGS: denies shortness of breath with exertion  CARDIOVASCULAR:  denies chest pain on exertion  GI: denies abdominal pain, denies heartburn  : denies dysuria, vaginal discharge or itching, no complaint of urinary incontinence   MUSCULOSKELETAL: denies back pain  NEURO: denies headaches  PSYCHE: denies depression or anxiety  HEMATOLOGIC: denies hx of anemia  ENDOCRINE:  thyroid history  ALL/ASTHMA: denies asthma    Objective:   Physical Exam  General Appearance:  Alert, cooperative, no distress, appears stated age   Head:  Normocephalic, without obvious abnormality, atraumatic   Eyes:  PERRL, conjunctiva/corneas clear, EOM's intact both eyes   Ears:  Normal TM's and external ear canals, both ears   Nose: Nares normal, septum midline,mucosa normal, no drainage or sinus tenderness   Throat: Lips, mucosa, and tongue normal; teeth and gums normal   Neck: Supple, symmetrical, trachea midline, no adenopathy;  thyroid: enlarged, symmetric, no tenderness/mass/nodules; no carotid bruit or JVD   Back:   Symmetric, no curvature, ROM normal, no CVA tenderness   Lungs:   Clear to auscultation bilaterally, respirations unlabored   Heart:  Regular rate and rhythm, S1 and S2 normal, no murmur, rub, or gallop   Abdomen:   Soft, non-tender, bowel sounds active all four quadrants,  no masses, no organomegaly   Pelvic: Deferred   Extremities: Extremities normal, atraumatic, no cyanosis or edema   Pulses: 2+ and symmetric   Skin: Skin color, texture, turgor normal, no rashes or lesions   Lymph nodes: Cervical, supraclavicular, and axillary nodes normal   Neurologic: Normal       /76   Pulse 78   Resp 16   Ht 5' 3\" (1.6 m)   Wt 132 lb (59.9 kg)   SpO2 96%   BMI 23.38 kg/m²  Estimated body mass index is 23.38 kg/m² as calculated from the following:    Height as of this encounter: 5' 3\" (1.6 m).    Weight as of this encounter: 132 lb (59.9 kg).    Medicare Hearing Assessment:   Hearing Screening    Screening Method: Whisper Test  Whisper Test Result: Pass         Visual Acuity:   Right Eye  Visual Acuity: Corrected Right Eye Chart Acuity: 20/40   Left Eye Visual Acuity: Corrected Left Eye Chart Acuity: 20/40   Both Eyes Visual Acuity: Corrected Both Eyes Chart Acuity: 20/40   Able To Tolerate Visual Acuity: Yes        Assessment & Plan:   Sophia Rendon is a 73 year old female who presents for a Medicare Assessment.     1. Encounter for annual health examination (Primary)  2. Stable angina (HCC)- stable monitor   -     Detailed, Mod Complex (96615)  3. Chronic obstructive pulmonary disease, unspecified COPD type (HCC)- stable monitor   -     Detailed, Mod Complex (37644)  4. Moderate recurrent major depression (HCC)- stable monitor   -     Detailed, Mod Complex (23027)  5. Thoracic aorta atherosclerosis (HCC)- stable monitor   Overview:  5/22/17 CTA chest  Orders:  -     Detailed, Mod Complex (30156)  6. Centrilobular emphysema (HCC)- stable monitor   Overview:  CTA chest 5/22/17  Orders:  -     Detailed, Mod Complex (00141)  7. Invasive lobular carcinoma of breast in female (HCC)- stable monitor   -     Detailed, Mod Complex (48277)  8. Coronary artery disease involving native heart, unspecified vessel or lesion type, unspecified whether angina present- stable monitor   -     Detailed, Mod Complex (47298)  9. Vitamin D deficiency- stable monitor   -     Detailed, Mod Complex (01786)  -     Vitamin D (Ergocalciferol); Take 1 capsule (50,000 Units total) by mouth once a week.  Dispense: 12 capsule; Refill: 1  -     Vitamin D; Future; Expected date: 10/29/2024  10. Goiter- stable monitor   -     Detailed, Mod Complex (47237)  11. Other specified hypothyroidism- stable cpm  -     Detailed, Mod Complex (96818)  -     Free T4, (Free Thyroxine); Future; Expected date: 10/29/2024  -     Assay, Thyroid Stim Hormone; Future; Expected date: 10/29/2024  12. Migraine with aura and without status migrainosus, not intractable- stable monitor   -     Detailed, Mod Complex (02434)  13. Anxiety- stable monitor   -      Detailed, Mod Complex (29370)  14. Diverticulosis of small intestine without hemorrhage- stable monitor   -     Detailed, Mod Complex (17111)  15. Symptomatic cholelithiasis- stable monitor   -     Detailed, Mod Complex (19274)  16. Platelet inhibition due to Plavix- stable monitor   -     Detailed, Mod Complex (89579)  17. Tobacco abuse- stable monitor   -     Detailed, Mod Complex (09119)  18. Elevated hemoglobin (HCC)- stable monitor   -     CBC With Differential With Platelet; Future; Expected date: 10/29/2024  19. Hyperlipidemia, unspecified hyperlipidemia type- stable monitor   -     Lipid Panel; Future; Expected date: 10/29/2024  -     Comp Metabolic Panel (14); Future; Expected date: 10/29/2024  20. B12 deficiency- stable monitor   -     Vitamin B12; Future; Expected date: 10/29/2024  -     Cancel: Hemoglobin A1C; Future; Expected date: 10/29/2024    The patient indicates understanding of these issues and agrees to the plan.  Reinforced healthy diet, lifestyle, and exercise.    Return in 6 months (on 4/29/2025).     Mallory Johnson DO, 10/31/2024     Supplementary Documentation:   General Health:  In the past six months, have you lost more than 10 pounds without trying?: (Patient-Rptd) 2 - No  Has your appetite been poor?: (Patient-Rptd) No  Type of Diet: (Patient-Rptd) Other  How does the patient maintain a good energy level?: (Patient-Rptd) Stretching  How would you describe your daily physical activity?: (Patient-Rptd) Light  How would you describe your current health state?: (Patient-Rptd) Fair  How do you maintain positive mental well-being?: (Patient-Rptd) Puzzles;Games  On a scale of 0 to 10, with 0 being no pain and 10 being severe pain, what is your pain level?: (Patient-Rptd) 0 - (None)  In the past six months, have you experienced urine leakage?: (Patient-Rptd) 0-No  At any time do you feel concerned for the safety/well-being of yourself and/or your children, in your home or elsewhere?:  (Patient-Rptd) No  Have you had any immunizations at another office such as Influenza, Hepatitis B, Tetanus, or Pneumococcal?: (Patient-Rptd) No    Health Maintenance   Topic Date Due    Zoster Vaccines (1 of 2) Never done    MA Annual Health Assessment  01/01/2024    Tobacco Cessation Counseling  01/01/2024    COVID-19 Vaccine (6 - 2023-24 season) 09/01/2024    Influenza Vaccine (1) 10/01/2024    Colorectal Cancer Screening  06/01/2025    Mammogram  07/15/2025    DEXA Scan  Completed    Annual Depression Screening  Completed    Fall Risk Screening (Annual)  Completed    Pneumococcal Vaccine: 65+ Years  Completed

## 2024-11-05 PROCEDURE — 77387 GUIDANCE FOR RADJ TX DLVR: CPT | Performed by: RADIOLOGY

## 2024-11-05 PROCEDURE — 77412 RADIATION TX DELIVERY LVL 3: CPT | Performed by: RADIOLOGY

## 2024-11-06 ENCOUNTER — OFFICE VISIT (OUTPATIENT)
Dept: PHYSICAL THERAPY | Facility: HOSPITAL | Age: 74
End: 2024-11-06
Payer: MEDICARE

## 2024-11-06 PROCEDURE — 97140 MANUAL THERAPY 1/> REGIONS: CPT | Performed by: PHYSICAL THERAPIST

## 2024-11-06 PROCEDURE — 77412 RADIATION TX DELIVERY LVL 3: CPT | Performed by: RADIOLOGY

## 2024-11-06 PROCEDURE — 77387 GUIDANCE FOR RADJ TX DLVR: CPT | Performed by: RADIOLOGY

## 2024-11-06 NOTE — PROGRESS NOTES
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   Past Medical History:    Anxiety state    Chronic PTSD    Breast cancer (HCC)    Cancer (HCC)    Cataract    Colon cancer screening    COPD (chronic obstructive pulmonary disease)    Depression    Disorder of thyroid    Diverticulitis    Fall    Goiter    Graves disease    Heart attack (HCC)    4 stents    Heart disease    History of appendectomy    History of hysterectomy    Hypothyroid    Migraines    Ovarian mass    Pneumonia    Pneumonia, organism unspecified(486)    Post PTCA    Post traumatic stress disorder (PTSD)    Pulmonary emphysema (HCC)    Shortness of breath    Tobacco abuse    Unspecified essential hypertension    Visual impairment    glasses         Precautions:  Breast cancer, recent infection  Education or treatment limitation: PTSD, anxiety, depression, agoraphobia   Rehab Potential:good        PAIN: 1-2/10 down R arm to wrist       SUBJECTIVE: \"I think I am swollen more\"        OBJECTIVE:    11/6/2024:  Swelling R breast w/ slight redness  Trunk Measurement:  11 cm inf to sternal notch: 92 cm  (IE:  93.6 cm)  21 cm inf to sternal notch: 94.9 cm (IE:  98.6 cm)    Cording and fascial tightness noted R axilla down arm (Does not restrict ROM or cause pain)    AROM R shoulder WFL       10/29/2024:  B shld AROM WFL, symmetrical but \"pulling\"  Cording R axilla down to wrist  Swelling R breast w/ slight redness (but significantly improved)          10/21/2024:  Swelling R breast: mild swelling, slight redness  Cording: R axilla down to elbow   Shoulder AROM: WFL, symmetrical  Bruising R forearm (from past cording that broke up last week)      Trunk Measurement:  11 cm inf to sternal notch: 91.4 cm  (IE:  93.6 cm)  21 cm inf to  sternal notch: 95.5 cm (IE:  98.6 cm)        10/14/2024:  Swelling and redness R breast  Trunk Measurement:  11 cm inf to sternal notch: 93 cm  (IE:  93.6 cm)  21 cm inf to sternal notch: 96 cm (IE:  98.6 cm)    Cording R axilla extending into breast and down towards forearm     Shld AROM symmetrical/WFL bilaterally but w/ \"pulling\" on right          10/7/2024:  Swelling and redness R breast  Cording R axilla down to forearm   Decreased ROM right shld        9/30/2024 (Evaluation):    AROM/Strength:     9/30/2024  Right AROM 9/30/2024  Left AROM 9/30/2024  Right Strength 9/30/2024  Left Strength   Shoulder             Flexion 155 160 4/5 4/5        Abduction 160 170 4-/5 4/5        IR WFL WFL 4/5 4/5        ER 90 90 4/5 4/5         Posture: guarded posturing, holds R breast     Edema/Tissue Observations:    - swelling R breast  - redness R breast   - seroma R axilla/lateral breast  - poor scar mobility (breast and axillary)  - cording R axilla down to elbow  - slight swelling R upper arm    Trunk Measurement:  11 cm inf to sternal notch: 93.6 cm  21 cm inf to sternal notch: 98.6 cm        Stemmer's Sign: negative     Arm Volume Measurements:       9/30/2024    12:00 PM   Lymphedema Calculations   DATE MEASURED 9/30/2024   LOCATION/MEASUREMENTS RUE   PATIENT POSITION  supine   LIMB POSITION 75 degrees abd   STARTING POINT 17 cm from 3rd cuticle   RIGHT HAND VOLUME 18.3 across palm   LEFT HAND VOLUME 18.2 across palm   MEASUREMENT A 14.8   MEASUREMENT B 16.5   MEASUREMENT C 19.3   MEASUREMENT D 22.3   MEASUREMENT E 23.4   MEASUREMENT F 25.6   MEASUREMENT G 28   MEASUREMENT H 31.2   MEASUREMENT I 33.6    TOTAL VOLUME  [id number]   DATE MEASURED 9/30/2024   LOCATION/MEASUREMENTS LUE   MEASUREMENT A 14.8   MEASUREMENT B 15.9   MEASUREMENT C 19.1   MEASUREMENT D 21.8   MEASUREMENT E 23.1   MEASUREMENT F 24.3   MEASUREMENT G 28   MEASUREMENT H 30.8   MEASUREMENT I 32.5    TOTAL VOLUME  [id number]   % DIFFERENCE 4.20291              Lymphedema Life Impact Scale: Evaluation Score 9/30/2024: LLIS Score Evaluation: 0 % impaired. (0% is no impairment, 100% total impairment)       Today’s Treatment and Response:   Date: 10/23/2024 10/29/2024 11/6/2024   Visit: #6/10  Certification From: 9/30/2024  To:12/29/2024  Visit: #7/10  Certification From: 9/30/2024  To:12/29/2024  Visit: #8/10  Certification From: 9/30/2024  To:12/29/2024    Manual therapy (55 min)    STM R axilla/arm, (no significant cording noted but areas of scar tissue from past cording palpable)    MLD: supraclavicular, R inguinal R A-I, L axilla, R axilla, A-A, R breast, R UE.       Compression:  - pt wearing compression pads in R bra  - donned sample compression sleeve, pt w/ poor tolerance to 20-30 mmhg  - measured for compression sleeve and gauntlet   Palm: 19 cm  Wrist: 15.6 cm  Elbow: 23.3 cm  Upper: 30 cm  Length: 40 cm    Recommended Juzo soft sleeve 15-20 mmhg size 1max, size small gauntlet   (Info on [email or web address] given)     Manual therapy (45 min)      STM R axilla/arm/area of cording. Several cavitations noted    MLD: supraclavicular, R inguinal R A-I, L axilla, R axilla, A-A, R breast, R UE.       Compression:  - pt wearing compression pads in R bra  - pt stating she will order compression sleeve next month after her next social security check  Manual Therapy (45 min)    STM R axilla/arm/area of cording/fascial tightness. Small cavitation noted     MLD: supraclavicular, R inguinal R A-I, L axilla, R axilla, A-A, R breast, R UE.       Compression:  - pt bought compression sleeve and gauntlet. Good fit. Reviewed proper donning. Instr to wear when she has arm pain from cording or swelling      There Act (10 min)  - discussed benefits of compression sleeve/prophylactic                    ASSESSMENT:  Slight increase swelling but improved w/ MLD.       Goals (discussed and planned with patient involvement):      To be met in 10 visits:  1) Decrease swelling R  breast/trunk by a total of 3 cm to decrease risks of further infections  2) Pt to be independent with self MLD, ROM exercises, and donning/doffing compression bandages/garments to facilitate swelling reduction and to be independent at self management once discharged  3) Increase R shoulder AROM to be symmetrical to L shld AROM to improve ease of dressing/bathing/and reaching overhead - MET  4) Increase B UE strength to at least 4+/5 to improve ease of lifting and performing household chores      PLAN:     Frequency / Duration: Patient will be seen for 1-2 x/week or a total of 10 visits over a 90 day period. Frequency will decrease as symptoms improve.     Treatment will include: Complete Decongestive Therapy: Manual Therapy, Self-Care/Home Management Training, Therapeutic Exercise, Neuromuscular Re-education, Orthotic Management and Training        Charges mm3        Total Timed Treatment:  45 min     Total Treatment Time: 45  min

## 2024-11-07 ENCOUNTER — HOSPITAL ENCOUNTER (OUTPATIENT)
Dept: RADIATION ONCOLOGY | Facility: HOSPITAL | Age: 74
Discharge: HOME | End: 2024-11-07
Attending: RADIOLOGY
Payer: MEDICARE

## 2024-11-07 VITALS
DIASTOLIC BLOOD PRESSURE: 77 MMHG | HEART RATE: 75 BPM | OXYGEN SATURATION: 96 % | TEMPERATURE: 99 F | RESPIRATION RATE: 18 BRPM | SYSTOLIC BLOOD PRESSURE: 132 MMHG

## 2024-11-07 PROCEDURE — 77412 RADIATION TX DELIVERY LVL 3: CPT | Performed by: RADIOLOGY

## 2024-11-07 PROCEDURE — 77387 GUIDANCE FOR RADJ TX DLVR: CPT | Performed by: RADIOLOGY

## 2024-11-07 NOTE — PROGRESS NOTES
[facility] Radiation Treatment Management Note 6-10    Patient:  Sophia Rendon  Age:  73 year old  Visit Diagnosis:  No diagnosis found.  Primary Rad/Onc:  Dr. Pawan Gomez    Site Delivered Dose (cGy) Prescribed Dose (cGy) Fraction #   R BREAST & SCF 1200 5000 6/25   R BREAST BOOST 0 1000 0/5     First treatment date:  10/30/24  Concurrent chemotherapy: N/A        9/24/2024     1:02 PM 10/29/2024    11:09 AM 11/7/2024     2:51 PM   Oncology Vitals   Height  5' 3\"    Height  160 cm    Weight 133 lb 132 lb    Weight 60.328 kg 59.875 kg    BSA (m2) 1.63 m2 1.62 m2    BMI 23.56 kg/m2 23.38 kg/m2    /61 118/76 132/77   Pulse 95 78 75   Resp 18 16 18   Temp 98 °F (36.7 °C)  98.8 °F (37.1 °C)   SpO2 94 % 96 % 96 %   Pain Score 7  0        Toxicities:  Fatigue Grade 1= Fatigue relieved by rest  Pruritis Grade 0= None  Dry Skin absent  Dermatitis associated with radiation Grade 1= Faint erythema or dry desquamation  Moisturizer used Vanicream applied Number of times: 2 times daily.  Hyperpigmentation absent    Nursing Note:  Skin to breast is pink  No open areas noted  Using vanicream BID  Gets occasional zingers to incision sites    Rosemarie COBURN RN    Physician Note:  Subjective:    Agree with RN note as above  Objective:  NAD      Treatment setup imaging have been reviewed:  Yes    Assessment/Plan:    4F XRT      Continue radiotherapy per plan    Next visit:  1 week    Dr. Davy Judge for ALONZO Gomez MD

## 2024-11-08 PROCEDURE — 77412 RADIATION TX DELIVERY LVL 3: CPT | Performed by: RADIOLOGY

## 2024-11-08 PROCEDURE — 77336 RADIATION PHYSICS CONSULT: CPT | Performed by: RADIOLOGY

## 2024-11-08 PROCEDURE — 77387 GUIDANCE FOR RADJ TX DLVR: CPT | Performed by: RADIOLOGY

## 2024-11-12 ENCOUNTER — NURSE ONLY (OUTPATIENT)
Dept: RADIATION ONCOLOGY | Facility: HOSPITAL | Age: 74
End: 2024-11-12

## 2024-11-12 VITALS
SYSTOLIC BLOOD PRESSURE: 144 MMHG | OXYGEN SATURATION: 95 % | HEART RATE: 68 BPM | DIASTOLIC BLOOD PRESSURE: 77 MMHG | RESPIRATION RATE: 20 BRPM

## 2024-11-12 PROCEDURE — 77387 GUIDANCE FOR RADJ TX DLVR: CPT | Performed by: RADIOLOGY

## 2024-11-12 PROCEDURE — 77412 RADIATION TX DELIVERY LVL 3: CPT | Performed by: RADIOLOGY

## 2024-11-13 ENCOUNTER — TELEPHONE (OUTPATIENT)
Dept: HEMATOLOGY/ONCOLOGY | Facility: HOSPITAL | Age: 74
End: 2024-11-13

## 2024-11-13 ENCOUNTER — APPOINTMENT (OUTPATIENT)
Dept: PHYSICAL THERAPY | Facility: HOSPITAL | Age: 74
End: 2024-11-13
Payer: MEDICARE

## 2024-11-13 PROCEDURE — 77387 GUIDANCE FOR RADJ TX DLVR: CPT | Performed by: RADIOLOGY

## 2024-11-13 PROCEDURE — 77412 RADIATION TX DELIVERY LVL 3: CPT | Performed by: RADIOLOGY

## 2024-11-13 NOTE — PROGRESS NOTES
[facility] Radiation Treatment Management Note 11-15    Patient:  Sohpia Rendon  Age:  73 year old  Visit Diagnosis:    1. 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 Pawan Gomez    Site Delivered Dose (cGy) Prescribed Dose (cGy) Fraction #   R BREAST & SCF 2000 5000 10/25   R BREAST BOOST 0 1000 0/5     First treatment date:  10/30/24  Concurrent chemotherapy: N/A        11/7/2024     2:51 PM 11/12/2024     4:59 PM 11/14/2024     3:04 PM   Oncology Vitals   /77 144/77 108/70   Pulse 75 68 68   Resp 18 20 18   Temp 98.8 °F (37.1 °C)  98.2 °F (36.8 °C)   SpO2 96 % 95 % 97 %   Pain Score 0  0        Toxicities:  Fatigue Grade 2= Fatigue not relieved by rest limiting instrumental ADL  Pruritis Grade 1= Mild or localized; topical intervention indicated  Dry Skin absent  Dermatitis associated with radiation Grade 1= Faint erythema or dry desquamation  Moisturizer used Vanicream and Aquaphor applied Number of times: 2 times daily.  Hyperpigmentation absent      Nursing Note:  Pt noticed some R arm swelling at the end of last week  Is wearing her compression sleeve today  Skin to breast is mildly pink  Moisturizing BID    Rosemarie COBURN RN MD NOTE   Reviewed and agree with RN note above.  Setup imaging reviewed in ARIA and approved.    S:  -doing well physically, but daily treatment is wearing on her bc of her hx of agoraphobia, which is quite severe; asking to be treated 4 days per week    O:  -mild redness    A/P:  -start mometasone bid   -will adjust fractionation to be 4x per week; slightly higher dose per day  -she understands this is outside the box but I understand she absolutely needs this    OTV in 1 week    Pawan Gomez MD  Radiation Oncology

## 2024-11-13 NOTE — TELEPHONE ENCOUNTER
Patient want to know if she need an appointment with Dr. John since she ordered labs for her. The patient would like a call back.

## 2024-11-14 ENCOUNTER — HOSPITAL ENCOUNTER (OUTPATIENT)
Dept: RADIATION ONCOLOGY | Facility: HOSPITAL | Age: 74
Discharge: HOME | End: 2024-11-14
Attending: RADIOLOGY
Payer: MEDICARE

## 2024-11-14 VITALS
OXYGEN SATURATION: 97 % | SYSTOLIC BLOOD PRESSURE: 108 MMHG | HEART RATE: 68 BPM | TEMPERATURE: 98 F | RESPIRATION RATE: 18 BRPM | DIASTOLIC BLOOD PRESSURE: 70 MMHG

## 2024-11-14 DIAGNOSIS — C50.411 CARCINOMA OF UPPER-OUTER QUADRANT OF RIGHT BREAST IN FEMALE, ESTROGEN RECEPTOR POSITIVE (HCC): Primary | ICD-10-CM

## 2024-11-14 DIAGNOSIS — Z17.0 CARCINOMA OF UPPER-OUTER QUADRANT OF RIGHT BREAST IN FEMALE, ESTROGEN RECEPTOR POSITIVE (HCC): Primary | ICD-10-CM

## 2024-11-14 PROCEDURE — 77387 GUIDANCE FOR RADJ TX DLVR: CPT | Performed by: RADIOLOGY

## 2024-11-14 PROCEDURE — 77412 RADIATION TX DELIVERY LVL 3: CPT | Performed by: RADIOLOGY

## 2024-11-15 PROCEDURE — 77412 RADIATION TX DELIVERY LVL 3: CPT | Performed by: RADIOLOGY

## 2024-11-15 PROCEDURE — 77387 GUIDANCE FOR RADJ TX DLVR: CPT | Performed by: RADIOLOGY

## 2024-11-18 ENCOUNTER — HOSPITAL ENCOUNTER (OUTPATIENT)
Dept: RADIATION ONCOLOGY | Facility: HOSPITAL | Age: 74
Discharge: HOME | End: 2024-11-18
Attending: RADIOLOGY
Payer: MEDICARE

## 2024-11-18 PROCEDURE — 77280 THER RAD SIMULAJ FIELD SMPL: CPT | Performed by: RADIOLOGY

## 2024-11-18 PROCEDURE — 77412 RADIATION TX DELIVERY LVL 3: CPT | Performed by: RADIOLOGY

## 2024-11-19 PROCEDURE — 77336 RADIATION PHYSICS CONSULT: CPT | Performed by: RADIOLOGY

## 2024-11-19 PROCEDURE — 77387 GUIDANCE FOR RADJ TX DLVR: CPT | Performed by: RADIOLOGY

## 2024-11-19 PROCEDURE — 77412 RADIATION TX DELIVERY LVL 3: CPT | Performed by: RADIOLOGY

## 2024-11-20 ENCOUNTER — APPOINTMENT (OUTPATIENT)
Dept: PHYSICAL THERAPY | Facility: HOSPITAL | Age: 74
End: 2024-11-20
Payer: MEDICARE

## 2024-11-21 ENCOUNTER — HOSPITAL ENCOUNTER (OUTPATIENT)
Dept: RADIATION ONCOLOGY | Facility: HOSPITAL | Age: 74
Discharge: HOME | End: 2024-11-21
Attending: RADIOLOGY
Payer: MEDICARE

## 2024-11-21 VITALS
TEMPERATURE: 98 F | DIASTOLIC BLOOD PRESSURE: 65 MMHG | RESPIRATION RATE: 20 BRPM | SYSTOLIC BLOOD PRESSURE: 132 MMHG | OXYGEN SATURATION: 97 % | HEART RATE: 76 BPM

## 2024-11-21 PROCEDURE — 77412 RADIATION TX DELIVERY LVL 3: CPT | Performed by: RADIOLOGY

## 2024-11-21 PROCEDURE — 77387 GUIDANCE FOR RADJ TX DLVR: CPT | Performed by: RADIOLOGY

## 2024-11-21 NOTE — PROGRESS NOTES
[facility] Radiation Treatment Management Note 11-15    Patient:  Sophia Rendon  Age:  74 year old  Visit Diagnosis:  No diagnosis found.  Primary Rad/Onc:  Dr. Pawan Gomez    Site Delivered Dose (cGy) Prescribed Dose (cGy) Fraction #   R BREAST & SCF 2200 5000 11/25   R BREAST BOOST 0 1000 0/5     First treatment date:   10/30/24  Concurrent chemotherapy:  N/A        11/12/2024     4:59 PM 11/14/2024     3:04 PM 11/21/2024     2:24 PM   Oncology Vitals   /77 108/70 132/65   Pulse 68 68 76   Resp 20 18 20   Temp  98.2 °F (36.8 °C) 98.3 °F (36.8 °C)   SpO2 95 % 97 % 97 %   Pain Score  0 0        Toxicities:  Fatigue Grade 0= None  Pruritis Grade 0= None  Dry Skin absent  Dermatitis associated with radiation Grade 2= Moderate to brisk erythema, patchy moist desquamation mostly confined to skin folds and creases; moderate edema  Moisturizer used Vanicream and Aquaphor applied Number of times: 2 times daily.  Hyperpigmentation absent    Nursing Note:  Had anxiety attack last night.  Feeling better today.  Has good ROM to RUE.    Suyapa COBURN, RN    Physician Note:  Subjective:    Agree with RN note as above  Objective:  NAD      Treatment setup imaging have been reviewed:  Yes    Assessment/Plan:    4F XRT  Pt opting for 4/wk treatment     Continue radiotherapy per plan    Next visit:  1 week    Dr. Davy Judge for ALONZO Gomez MD

## 2024-11-22 PROCEDURE — 77387 GUIDANCE FOR RADJ TX DLVR: CPT | Performed by: RADIOLOGY

## 2024-11-22 PROCEDURE — 77336 RADIATION PHYSICS CONSULT: CPT | Performed by: RADIOLOGY

## 2024-11-22 PROCEDURE — 77412 RADIATION TX DELIVERY LVL 3: CPT | Performed by: RADIOLOGY

## 2024-11-24 PROCEDURE — 77387 GUIDANCE FOR RADJ TX DLVR: CPT | Performed by: RADIOLOGY

## 2024-11-24 PROCEDURE — 77412 RADIATION TX DELIVERY LVL 3: CPT | Performed by: RADIOLOGY

## 2024-11-25 PROCEDURE — 77387 GUIDANCE FOR RADJ TX DLVR: CPT | Performed by: RADIOLOGY

## 2024-11-25 PROCEDURE — 77412 RADIATION TX DELIVERY LVL 3: CPT | Performed by: RADIOLOGY

## 2024-11-27 ENCOUNTER — HOSPITAL ENCOUNTER (OUTPATIENT)
Dept: RADIATION ONCOLOGY | Facility: HOSPITAL | Age: 74
Discharge: HOME | End: 2024-11-27
Attending: RADIOLOGY
Payer: MEDICARE

## 2024-11-27 ENCOUNTER — APPOINTMENT (OUTPATIENT)
Dept: RADIATION ONCOLOGY | Facility: HOSPITAL | Age: 74
End: 2024-11-27
Attending: RADIOLOGY
Payer: MEDICARE

## 2024-11-27 VITALS
RESPIRATION RATE: 18 BRPM | TEMPERATURE: 98 F | HEART RATE: 72 BPM | SYSTOLIC BLOOD PRESSURE: 126 MMHG | OXYGEN SATURATION: 94 % | DIASTOLIC BLOOD PRESSURE: 77 MMHG

## 2024-11-27 DIAGNOSIS — Z17.0 CARCINOMA OF UPPER-OUTER QUADRANT OF RIGHT BREAST IN FEMALE, ESTROGEN RECEPTOR POSITIVE (HCC): Primary | ICD-10-CM

## 2024-11-27 DIAGNOSIS — C50.411 CARCINOMA OF UPPER-OUTER QUADRANT OF RIGHT BREAST IN FEMALE, ESTROGEN RECEPTOR POSITIVE (HCC): Primary | ICD-10-CM

## 2024-11-27 PROCEDURE — 77336 RADIATION PHYSICS CONSULT: CPT | Performed by: RADIOLOGY

## 2024-11-27 PROCEDURE — 77387 GUIDANCE FOR RADJ TX DLVR: CPT | Performed by: RADIOLOGY

## 2024-11-27 PROCEDURE — 77412 RADIATION TX DELIVERY LVL 3: CPT | Performed by: RADIOLOGY

## 2024-11-27 RX ORDER — MOMETASONE FUROATE 1 MG/G
CREAM TOPICAL
Qty: 45 G | Refills: 1 | Status: SHIPPED | OUTPATIENT
Start: 2024-11-27

## 2024-11-27 NOTE — PROGRESS NOTES
[facility] Radiation Treatment Management Note 16-20    Patient:  Sophia Rendon  Age:  74 year old  Visit Diagnosis:    1. 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 Pawan Gomez    Site Delivered Dose (cGy) Prescribed Dose (cGy) Fraction #   R BREAST & SCF 2200 5000 11/25   R BREAST BOOST 0 1000 0/5   R BREAST 1 1750 2500 7/10   R SCF 1  1750 2250 7/9     First treatment date:  10/30/24  Concurrent chemotherapy: N/A        11/14/2024     3:04 PM 11/21/2024     2:24 PM 11/27/2024     1:25 PM   Oncology Vitals   /70 132/65 126/77   Pulse 68 76 72   Resp 18 20 18   Temp 98.2 °F (36.8 °C) 98.3 °F (36.8 °C) 98 °F (36.7 °C)   SpO2 97 % 97 % 94 %   Pain Score 0 0 0        Toxicities:  Fatigue Grade 2= Fatigue not relieved by rest limiting instrumental ADL  Pruritis Grade 0= None  Dry Skin absent  Dermatitis associated with radiation Grade 2= Moderate to brisk erythema, patchy moist desquamation mostly confined to skin folds and creases; moderate edema  Moisturizer used Vanicream and Aquaphor applied Number of times: 2 times daily.  Hyperpigmentation absent    Nursing Note:  Skin on breast and IMF red, not open  Skin in axilla with hyperpigmentation and erythema  No open areas noted  Using Vanicream and aquaphor  Some lymphedema in R arm  Pt not wearing sleeve today    Rosemarie COBURN RN MD NOTE   Reviewed and agree with RN note above.  Setup imaging reviewed in ARIA and approved.    S:  -doing pretty well    O:  -diffuse redness, skin intact    A/P:  -cont RT 4 days/week (her request d/t agoraphobia; dose per fraction adjusted)  -start elocon bid    OTV in 1 week    Pawan Gomez MD  Radiation Oncology

## 2024-12-01 ENCOUNTER — HOSPITAL ENCOUNTER (OUTPATIENT)
Dept: RADIATION ONCOLOGY | Facility: HOSPITAL | Age: 74
Discharge: HOME | End: 2024-12-01
Attending: RADIOLOGY
Payer: MEDICARE

## 2024-12-02 PROCEDURE — 77412 RADIATION TX DELIVERY LVL 3: CPT | Performed by: RADIOLOGY

## 2024-12-02 PROCEDURE — 77387 GUIDANCE FOR RADJ TX DLVR: CPT | Performed by: RADIOLOGY

## 2024-12-03 PROCEDURE — 77387 GUIDANCE FOR RADJ TX DLVR: CPT | Performed by: RADIOLOGY

## 2024-12-03 PROCEDURE — 77412 RADIATION TX DELIVERY LVL 3: CPT | Performed by: RADIOLOGY

## 2024-12-04 ENCOUNTER — OFFICE VISIT (OUTPATIENT)
Dept: PHYSICAL THERAPY | Facility: HOSPITAL | Age: 74
End: 2024-12-04
Payer: MEDICARE

## 2024-12-04 PROCEDURE — 97140 MANUAL THERAPY 1/> REGIONS: CPT | Performed by: PHYSICAL THERAPIST

## 2024-12-04 NOTE — PROGRESS NOTES
[facility] Radiation Treatment Management Note 21-25    Patient:  Sophia Rendon  Age:  74 year old  Visit Diagnosis:    1. 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 Pawan Gomez    Site Delivered Dose (cGy) Prescribed Dose (cGy) Fraction #   R BREAST & SCF 5000 5000 25/25   R BREAST BOOST 0 1000 0/4   R BREAST 1 2500 2500 10/10   R SCF 1 2250 2250 9/9     First treatment date:  10/30/24  Concurrent chemotherapy: N/A        11/27/2024     1:25 PM 12/5/2024     2:58 PM 12/5/2024     3:11 PM   Oncology Vitals   /77  110/60   Pulse 72 73    Resp 18 18    Temp 98 °F (36.7 °C) 98.7 °F (37.1 °C)    SpO2 94 % 95 %    Pain Score 0 0         Toxicities:  Fatigue Grade 2= Fatigue not relieved by rest limiting instrumental ADL  Pruritis Grade 0= None  Dry Skin noted  Dermatitis associated with radiation Grade 1= Faint erythema or dry desquamation  Moisturizer used Mometasone and Aquaphor applied Number of times: 2 times daily.  Hyperpigmentation noted    Nursing Note:  Skin with dry desquamation in IMF  Skin to breast intact with some redness  SCF red, not open  Axilla with hyperpigmentation with small dry peel   Using mometasone and aquaphor  Went to lymphedema clinic yesterday    Rosemarie COBURN RN    Physician Note:  Subjective:  Doing well overall.  Moisturizing and using mometasone as directed.  Some discomfort in IMF, tolerable.  Going to lymphedema clinic as above.      Objective:  Grade 1-2 erythema, dry desquamation in inframammary fold.  Nothing moist.      Treatment setup imaging have been reviewed:  Yes    Assessment/Plan:    Continue radiotherapy per plan    Next visit:  1 week    Dr. River Skinner

## 2024-12-04 NOTE — PROGRESS NOTES
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   Past Medical History:    Anxiety state    Chronic PTSD    Breast cancer (HCC)    Cancer (HCC)    Cataract    Colon cancer screening    COPD (chronic obstructive pulmonary disease)    Depression    Disorder of thyroid    Diverticulitis    Fall    Goiter    Graves disease    Heart attack (HCC)    4 stents    Heart disease    History of appendectomy    History of hysterectomy    Hypothyroid    Migraines    Ovarian mass    Pneumonia    Pneumonia, organism unspecified(486)    Post PTCA    Post traumatic stress disorder (PTSD)    Pulmonary emphysema (HCC)    Shortness of breath    Tobacco abuse    Unspecified essential hypertension    Visual impairment    glasses         Precautions:  Breast cancer, recent infection  Education or treatment limitation: PTSD, anxiety, depression, agoraphobia   Rehab Potential:good        PAIN: 1-2/10 \"Uncomfortable\" R breast       SUBJECTIVE: \"My anxiety has been bad.\"        OBJECTIVE:  12/4/2024:  R breast swelling w/ radiation changes (redness and early onset of wet desquamation)  Mild swelling RUE      12/4/2024    11:00 AM 9/30/2024    12:00 PM   Lymphedema Calculations   DATE MEASURED 12/4/2024 9/30/2024   LOCATION/MEASUREMENTS RUE RUE   PATIENT POSITION  supine supine   LIMB POSITION 75 degrees abd 75 degrees abd   STARTING POINT 17 cm from 3rd cuticle 17 cm from 3rd cuticle   RIGHT HAND VOLUME 18.3 across palm 18.3 across palm   LEFT HAND VOLUME 18.2 across palm 18.2 across palm   MEASUREMENT A 15.1 14.8   MEASUREMENT B 16 16.5   MEASUREMENT C 19.2 19.3   MEASUREMENT D 22.1 22.3   MEASUREMENT E 23.3 23.4   MEASUREMENT F 24.5 25.6   MEASUREMENT G 27.6 28   MEASUREMENT H 30.7 31.2   MEASUREMENT I 33.5 33.6     TOTAL VOLUME  1841.61287 [id number]   DATE MEASURED 12/4/2024 9/30/2024   LOCATION/MEASUREMENTS LUE LUE   MEASUREMENT A 14.7 14.8   MEASUREMENT B 16 15.9   MEASUREMENT C 19 19.1   MEASUREMENT D 21.8 21.8   MEASUREMENT E 23.1 23.1   MEASUREMENT F 24.3 24.3   MEASUREMENT G 27.8 28   MEASUREMENT H 31.2 30.8   MEASUREMENT I 32.6 32.5    TOTAL VOLUME  1827.27108 1822.58354   % DIFFERENCE 0.7228 4.21355              11/6/2024:  Swelling R breast w/ slight redness  Trunk Measurement:  11 cm inf to sternal notch: 92 cm  (IE:  93.6 cm)  21 cm inf to sternal notch: 94.9 cm (IE:  98.6 cm)    Cording and fascial tightness noted R axilla down arm (Does not restrict ROM or cause pain)    AROM R shoulder WFL       10/29/2024:  B shld AROM WFL, symmetrical but \"pulling\"  Cording R axilla down to wrist  Swelling R breast w/ slight redness (but significantly improved)          10/21/2024:  Swelling R breast: mild swelling, slight redness  Cording: R axilla down to elbow   Shoulder AROM: WFL, symmetrical  Bruising R forearm (from past cording that broke up last week)      Trunk Measurement:  11 cm inf to sternal notch: 91.4 cm  (IE:  93.6 cm)  21 cm inf to sternal notch: 95.5 cm (IE:  98.6 cm)        10/14/2024:  Swelling and redness R breast  Trunk Measurement:  11 cm inf to sternal notch: 93 cm  (IE:  93.6 cm)  21 cm inf to sternal notch: 96 cm (IE:  98.6 cm)    Cording R axilla extending into breast and down towards forearm     Shld AROM symmetrical/WFL bilaterally but w/ \"pulling\" on right          10/7/2024:  Swelling and redness R breast  Cording R axilla down to forearm   Decreased ROM right shld        9/30/2024 (Evaluation):    AROM/Strength:     9/30/2024  Right AROM 9/30/2024  Left AROM 9/30/2024  Right Strength 9/30/2024  Left Strength   Shoulder             Flexion 155 160 4/5 4/5        Abduction 160 170 4-/5 4/5        IR WFL WFL 4/5 4/5        ER 90 90 4/5 4/5         Posture: guarded posturing, holds R breast      Edema/Tissue Observations:    - swelling R breast  - redness R breast   - seroma R axilla/lateral breast  - poor scar mobility (breast and axillary)  - cording R axilla down to elbow  - slight swelling R upper arm    Trunk Measurement:  11 cm inf to sternal notch: 93.6 cm  21 cm inf to sternal notch: 98.6 cm        Stemmer's Sign: negative     Arm Volume Measurements:       9/30/2024    12:00 PM   Lymphedema Calculations   DATE MEASURED 9/30/2024   LOCATION/MEASUREMENTS RUE   PATIENT POSITION  supine   LIMB POSITION 75 degrees abd   STARTING POINT 17 cm from 3rd cuticle   RIGHT HAND VOLUME 18.3 across palm   LEFT HAND VOLUME 18.2 across palm   MEASUREMENT A 14.8   MEASUREMENT B 16.5   MEASUREMENT C 19.3   MEASUREMENT D 22.3   MEASUREMENT E 23.4   MEASUREMENT F 25.6   MEASUREMENT G 28   MEASUREMENT H 31.2   MEASUREMENT I 33.6    TOTAL VOLUME  [id number]   DATE MEASURED 9/30/2024   LOCATION/MEASUREMENTS LUE   MEASUREMENT A 14.8   MEASUREMENT B 15.9   MEASUREMENT C 19.1   MEASUREMENT D 21.8   MEASUREMENT E 23.1   MEASUREMENT F 24.3   MEASUREMENT G 28   MEASUREMENT H 30.8   MEASUREMENT I 32.5    TOTAL VOLUME  1822.72635   % DIFFERENCE 4.18797             Lymphedema Life Impact Scale: Evaluation Score 9/30/2024: LLIS Score Evaluation: 0 % impaired. (0% is no impairment, 100% total impairment)       Today’s Treatment and Response:   Date: 10/23/2024 10/29/2024 11/6/2024 12/4/2024   Visit: #6/10  Certification From: 9/30/2024  To:12/29/2024  Visit: #7/10  Certification From: 9/30/2024  To:12/29/2024  Visit: #8/10  Certification From: 9/30/2024  To:12/29/2024  Visit: #9/10  Certification From: 9/30/2024  To:12/29/2024    Manual therapy (55 min)    STM R axilla/arm, (no significant cording noted but areas of scar tissue from past cording palpable)    MLD: supraclavicular, R inguinal R A-I, L axilla, R axilla, A-A, R breast, R UE.       Compression:  - pt wearing compression pads in R bra  - donned sample compression  sleeve, pt w/ poor tolerance to 20-30 mmhg  - measured for compression sleeve and gauntlet   Palm: 19 cm  Wrist: 15.6 cm  Elbow: 23.3 cm  Upper: 30 cm  Length: 40 cm    Recommended Juzo soft sleeve 15-20 mmhg size 1max, size small gauntlet   (Info on [email or web address] given)     Manual therapy (45 min)      STM R axilla/arm/area of cording. Several cavitations noted    MLD: supraclavicular, R inguinal R A-I, L axilla, R axilla, A-A, R breast, R UE.       Compression:  - pt wearing compression pads in R bra  - pt stating she will order compression sleeve next month after her next social security check  Manual Therapy (45 min)    STM R axilla/arm/area of cording/fascial tightness. Small cavitation noted     MLD: supraclavicular, R inguinal R A-I, L axilla, R axilla, A-A, R breast, R UE.       Compression:  - pt bought compression sleeve and gauntlet. Good fit. Reviewed proper donning. Instr to wear when she has arm pain from cording or swelling    Manual Therapy (45 min)    Arm volume Measurements    MLD of breast deferred due to skin changes from radiation    MLD R axilla and RUE    Lotion applied R axilla, breast, and back. Aquaphor to R areola.    Provided pt w/ cotton stockinette to apply under R breast to avoid skin on skin friction/contact    Compression  - pt to cont to wear compress sleeve/gauntlet as needed/tolerated     There Act (10 min)  - discussed benefits of compression sleeve/prophylactic                      ASSESSMENT:  Swelling RUE is decreasing. Increased swelling of breast noted but appears to be related to radiation. No cording noted        Goals (discussed and planned with patient involvement):      To be met in 10 visits:  1) Decrease swelling R breast/trunk by a total of 3 cm to decrease risks of further infections  2) Pt to be independent with self MLD, ROM exercises, and donning/doffing compression bandages/garments to facilitate swelling reduction and to be independent at self management  once discharged  3) Increase R shoulder AROM to be symmetrical to L shld AROM to improve ease of dressing/bathing/and reaching overhead - MET  4) Increase B UE strength to at least 4+/5 to improve ease of lifting and performing household chores      PLAN:     Re-assess once skin healed from radiation     Treatment will include: Complete Decongestive Therapy: Manual Therapy, Self-Care/Home Management Training, Therapeutic Exercise, Neuromuscular Re-education, Orthotic Management and Training        Charges mm3        Total Timed Treatment:  45 min     Total Treatment Time: 45  min

## 2024-12-05 ENCOUNTER — HOSPITAL ENCOUNTER (OUTPATIENT)
Dept: RADIATION ONCOLOGY | Facility: HOSPITAL | Age: 74
Discharge: HOME | End: 2024-12-05
Attending: RADIOLOGY
Payer: MEDICARE

## 2024-12-05 ENCOUNTER — APPOINTMENT (OUTPATIENT)
Dept: RADIATION ONCOLOGY | Facility: HOSPITAL | Age: 74
End: 2024-12-05
Attending: RADIOLOGY
Payer: MEDICARE

## 2024-12-05 VITALS
RESPIRATION RATE: 18 BRPM | OXYGEN SATURATION: 95 % | DIASTOLIC BLOOD PRESSURE: 60 MMHG | TEMPERATURE: 99 F | SYSTOLIC BLOOD PRESSURE: 110 MMHG | HEART RATE: 73 BPM

## 2024-12-05 DIAGNOSIS — Z17.0 CARCINOMA OF UPPER-OUTER QUADRANT OF RIGHT BREAST IN FEMALE, ESTROGEN RECEPTOR POSITIVE (HCC): Primary | ICD-10-CM

## 2024-12-05 DIAGNOSIS — C50.411 CARCINOMA OF UPPER-OUTER QUADRANT OF RIGHT BREAST IN FEMALE, ESTROGEN RECEPTOR POSITIVE (HCC): Primary | ICD-10-CM

## 2024-12-05 PROCEDURE — 77412 RADIATION TX DELIVERY LVL 3: CPT | Performed by: RADIOLOGY

## 2024-12-05 PROCEDURE — 77387 GUIDANCE FOR RADJ TX DLVR: CPT | Performed by: RADIOLOGY

## 2024-12-06 ENCOUNTER — APPOINTMENT (OUTPATIENT)
Dept: RADIATION ONCOLOGY | Facility: HOSPITAL | Age: 74
End: 2024-12-06
Attending: RADIOLOGY
Payer: MEDICARE

## 2024-12-06 PROCEDURE — 77336 RADIATION PHYSICS CONSULT: CPT | Performed by: RADIOLOGY

## 2024-12-06 PROCEDURE — 77280 THER RAD SIMULAJ FIELD SMPL: CPT | Performed by: RADIOLOGY

## 2024-12-06 PROCEDURE — 77412 RADIATION TX DELIVERY LVL 3: CPT | Performed by: RADIOLOGY

## 2024-12-09 ENCOUNTER — HOSPITAL ENCOUNTER (OUTPATIENT)
Dept: RADIATION ONCOLOGY | Facility: HOSPITAL | Age: 74
Discharge: HOME | End: 2024-12-09
Attending: RADIOLOGY
Payer: MEDICARE

## 2024-12-09 PROCEDURE — 77412 RADIATION TX DELIVERY LVL 3: CPT | Performed by: RADIOLOGY

## 2024-12-09 PROCEDURE — 77387 GUIDANCE FOR RADJ TX DLVR: CPT | Performed by: RADIOLOGY

## 2024-12-10 PROCEDURE — 77387 GUIDANCE FOR RADJ TX DLVR: CPT | Performed by: RADIOLOGY

## 2024-12-10 PROCEDURE — 77412 RADIATION TX DELIVERY LVL 3: CPT | Performed by: RADIOLOGY

## 2024-12-11 ENCOUNTER — DOCUMENTATION ONLY (OUTPATIENT)
Dept: RADIATION ONCOLOGY | Facility: HOSPITAL | Age: 74
End: 2024-12-11

## 2024-12-11 PROCEDURE — 77387 GUIDANCE FOR RADJ TX DLVR: CPT | Performed by: RADIOLOGY

## 2024-12-11 PROCEDURE — 77412 RADIATION TX DELIVERY LVL 3: CPT | Performed by: RADIOLOGY

## 2024-12-12 ENCOUNTER — APPOINTMENT (OUTPATIENT)
Dept: RADIATION ONCOLOGY | Facility: HOSPITAL | Age: 74
End: 2024-12-12
Attending: RADIOLOGY
Payer: MEDICARE

## 2024-12-13 PROCEDURE — 77336 RADIATION PHYSICS CONSULT: CPT | Performed by: RADIOLOGY

## 2024-12-14 NOTE — PROGRESS NOTES
[facility] RADIATION ONCOLOGY  TREATMENT SUMMARY    PATIENT:    Sophia Rendon  DIAGNOSIS:    R breast cancer    REFERRING MD:   CHARMAINE Yu MD    HISTORY:  73 yo with severe agoraphobia. Had BCS and SN biopsy for pT2 N1 grade 2 ILC. ER+ Her2 0. Close final lateral margin. 3 positive nodes with multiple foci of GENA. No axillary dissection. No chemo with Oncotype DX RS 8. Developed cording postop. Had aspiration of seroma x 2. Started her AI. Then underwent adjuvant RT.    TREATMENT SUMMARY:    R breast/low axilla  2200 cGy in 11 fractions  2500 cGy in 10 fractions  10 MV photons  Tangents with IGRT  10/30/2024 to 12/5/2024    R SCF/axillary apex  2200 cGy in 11 fractions  2250 cGy in 9 fractions  10 , 18 MV photons  Obliques with IGRT  10/30/2024 to 12/5/2024    R breast boost  1000 cGy in 4 fractions  6, 10 MV photons  3D CRT with IGRT  12/6/2024 to 12/11/2024    The right breast and regional nodes were treated to 22 Gy in 11 fractions in usual fashion. At that point, patient requested \"4 days per week\" treatment with \"Wednesdays off\" due to her severe agoraphobia. How can we make that happen for her. So the dose per fraction was increased to account for the time dose effect.     She received 25 Gy in 10 fractions to the right breast and 22.5 Gy in 9 fraction to the regional nodes getting RT no more than 4 times per week.    The breast boost was also given in 4 fractions.     This adjustment helped to finish radiation at all.    PLAN:  F/u with oncologist, breast surgeon  F/u here 6 mo    Pawan Gomez MD  Radiation Oncology

## 2025-01-08 ENCOUNTER — APPOINTMENT (OUTPATIENT)
Dept: PHYSICAL THERAPY | Facility: HOSPITAL | Age: 75
End: 2025-01-08
Payer: MEDICARE

## 2025-01-20 ENCOUNTER — APPOINTMENT (OUTPATIENT)
Dept: PHYSICAL THERAPY | Facility: HOSPITAL | Age: 75
End: 2025-01-20
Payer: MEDICARE

## 2025-01-20 ENCOUNTER — TELEPHONE (OUTPATIENT)
Dept: PHYSICAL THERAPY | Facility: HOSPITAL | Age: 75
End: 2025-01-20

## 2025-01-30 ENCOUNTER — DOCUMENTATION ONLY (OUTPATIENT)
Dept: SURGERY | Facility: CLINIC | Age: 75
End: 2025-01-30

## 2025-01-30 DIAGNOSIS — C50.919 INVASIVE LOBULAR CARCINOMA OF BREAST IN FEMALE (HCC): Primary | ICD-10-CM

## 2025-02-12 ENCOUNTER — TELEPHONE (OUTPATIENT)
Age: 75
End: 2025-02-12

## 2025-02-20 ENCOUNTER — OFFICE VISIT (OUTPATIENT)
Age: 75
End: 2025-02-20
Attending: FAMILY MEDICINE
Payer: MEDICARE

## 2025-02-20 ENCOUNTER — LAB ENCOUNTER (OUTPATIENT)
Dept: LAB | Age: 75
End: 2025-02-20
Attending: FAMILY MEDICINE
Payer: MEDICARE

## 2025-02-20 DIAGNOSIS — E53.8 B12 DEFICIENCY: ICD-10-CM

## 2025-02-20 DIAGNOSIS — E78.5 HYPERLIPIDEMIA, UNSPECIFIED HYPERLIPIDEMIA TYPE: ICD-10-CM

## 2025-02-20 DIAGNOSIS — M89.9 BONE DISORDER: ICD-10-CM

## 2025-02-20 DIAGNOSIS — Z78.0 POSTMENOPAUSAL: ICD-10-CM

## 2025-02-20 DIAGNOSIS — Z08 ENCOUNTER FOR FOLLOW-UP EXAMINATION AFTER COMPLETED TREATMENT FOR MALIGNANT NEOPLASM: Primary | ICD-10-CM

## 2025-02-20 DIAGNOSIS — D58.2 ELEVATED HEMOGLOBIN: ICD-10-CM

## 2025-02-20 DIAGNOSIS — Z71.9 COUNSELING, UNSPECIFIED: ICD-10-CM

## 2025-02-20 DIAGNOSIS — E03.8 OTHER SPECIFIED HYPOTHYROIDISM: ICD-10-CM

## 2025-02-20 DIAGNOSIS — E55.9 VITAMIN D DEFICIENCY: ICD-10-CM

## 2025-02-20 DIAGNOSIS — F41.0 PANIC ATTACKS: ICD-10-CM

## 2025-02-20 DIAGNOSIS — F40.00 AGORAPHOBIA: ICD-10-CM

## 2025-02-20 DIAGNOSIS — C50.919 INVASIVE LOBULAR CARCINOMA OF BREAST IN FEMALE (HCC): ICD-10-CM

## 2025-02-20 DIAGNOSIS — Z85.3 PERSONAL HISTORY OF BREAST CANCER: ICD-10-CM

## 2025-02-20 LAB
ALBUMIN SERPL-MCNC: 4.6 G/DL (ref 3.2–4.8)
ALBUMIN/GLOB SERPL: 1.8 {RATIO} (ref 1–2)
ALP LIVER SERPL-CCNC: 76 U/L
ALT SERPL-CCNC: 16 U/L
ANION GAP SERPL CALC-SCNC: 8 MMOL/L (ref 0–18)
AST SERPL-CCNC: 24 U/L (ref ?–34)
BASOPHILS # BLD AUTO: 0.07 X10(3) UL (ref 0–0.2)
BASOPHILS NFR BLD AUTO: 1.4 %
BILIRUB SERPL-MCNC: 0.9 MG/DL (ref 0.2–1.1)
BUN BLD-MCNC: 10 MG/DL (ref 9–23)
CALCIUM BLD-MCNC: 10.2 MG/DL (ref 8.7–10.6)
CHLORIDE SERPL-SCNC: 101 MMOL/L (ref 98–112)
CHOLEST SERPL-MCNC: 141 MG/DL (ref ?–200)
CO2 SERPL-SCNC: 31 MMOL/L (ref 21–32)
CREAT BLD-MCNC: 0.83 MG/DL
EGFRCR SERPLBLD CKD-EPI 2021: 74 ML/MIN/1.73M2 (ref 60–?)
EOSINOPHIL # BLD AUTO: 0.09 X10(3) UL (ref 0–0.7)
EOSINOPHIL NFR BLD AUTO: 1.8 %
ERYTHROCYTE [DISTWIDTH] IN BLOOD BY AUTOMATED COUNT: 13.7 %
FASTING PATIENT LIPID ANSWER: YES
FASTING STATUS PATIENT QL REPORTED: YES
GLOBULIN PLAS-MCNC: 2.5 G/DL (ref 2–3.5)
GLUCOSE BLD-MCNC: 99 MG/DL (ref 70–99)
HCT VFR BLD AUTO: 44.7 %
HDLC SERPL-MCNC: 54 MG/DL (ref 40–59)
HGB BLD-MCNC: 15.5 G/DL
IMM GRANULOCYTES # BLD AUTO: 0.01 X10(3) UL (ref 0–1)
IMM GRANULOCYTES NFR BLD: 0.2 %
LDLC SERPL CALC-MCNC: 63 MG/DL (ref ?–100)
LYMPHOCYTES # BLD AUTO: 0.9 X10(3) UL (ref 1–4)
LYMPHOCYTES NFR BLD AUTO: 17.8 %
MCH RBC QN AUTO: 32.3 PG (ref 26–34)
MCHC RBC AUTO-ENTMCNC: 34.7 G/DL (ref 31–37)
MCV RBC AUTO: 93.1 FL
MONOCYTES # BLD AUTO: 0.39 X10(3) UL (ref 0.1–1)
MONOCYTES NFR BLD AUTO: 7.7 %
NEUTROPHILS # BLD AUTO: 3.61 X10 (3) UL (ref 1.5–7.7)
NEUTROPHILS # BLD AUTO: 3.61 X10(3) UL (ref 1.5–7.7)
NEUTROPHILS NFR BLD AUTO: 71.1 %
NONHDLC SERPL-MCNC: 87 MG/DL (ref ?–130)
OSMOLALITY SERPL CALC.SUM OF ELEC: 289 MOSM/KG (ref 275–295)
PLATELET # BLD AUTO: 210 10(3)UL (ref 150–450)
POTASSIUM SERPL-SCNC: 4.3 MMOL/L (ref 3.5–5.1)
PROT SERPL-MCNC: 7.1 G/DL (ref 5.7–8.2)
RBC # BLD AUTO: 4.8 X10(6)UL
SODIUM SERPL-SCNC: 140 MMOL/L (ref 136–145)
T4 FREE SERPL-MCNC: 1.4 NG/DL (ref 0.8–1.7)
TRIGL SERPL-MCNC: 142 MG/DL (ref 30–149)
TSI SER-ACNC: 2.25 UIU/ML (ref 0.55–4.78)
VIT B12 SERPL-MCNC: 939 PG/ML (ref 211–911)
VIT D+METAB SERPL-MCNC: 99.8 NG/ML (ref 30–100)
VLDLC SERPL CALC-MCNC: 21 MG/DL (ref 0–30)
WBC # BLD AUTO: 5.1 X10(3) UL (ref 4–11)

## 2025-02-20 PROCEDURE — 36415 COLL VENOUS BLD VENIPUNCTURE: CPT

## 2025-02-20 PROCEDURE — 80053 COMPREHEN METABOLIC PANEL: CPT

## 2025-02-20 PROCEDURE — 82607 VITAMIN B-12: CPT

## 2025-02-20 PROCEDURE — 82306 VITAMIN D 25 HYDROXY: CPT

## 2025-02-20 PROCEDURE — 85025 COMPLETE CBC W/AUTO DIFF WBC: CPT

## 2025-02-20 PROCEDURE — 84439 ASSAY OF FREE THYROXINE: CPT

## 2025-02-20 PROCEDURE — 84443 ASSAY THYROID STIM HORMONE: CPT

## 2025-02-20 PROCEDURE — 80061 LIPID PANEL: CPT

## 2025-02-20 NOTE — PROGRESS NOTES
I met with Sophia for a Survivorship Clinic visit to provide a survivorship care plan (SCP) and information related to post-treatment care.   She is a pleasant 74 year old female who noted a palpable right breast lump.  She had a mammogram and breast ultrasound on 6/24/24 that showed a 10 x 7 x 8 mm mass in the 9 o'clock position of the retroareolar right breast.  No axillary adenopathy was seen on ultrasound. Ultrasound-guided biopsy was done on 6/27/24 and confirmed grade 1 ILC, ER 95, FL 0, Ki-67 1%, HER2 0. Breast MRI was done on 7/15/24 that showed a 2 x 2.1 x 1.7 cm area of enhancement in the 9:00 retroareolar right breast with no axillary or internal mammary adenopathy. 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      On 8/16/24 Dr. Liliana Yu performed a right lumpectomy with sentinel lymph node biopsy. Pathology showed a 3.6 cm grade 2 ILC with classic LCIS in the background.  There were 3 positive sentinel lymph nodes of 5 evaluated. Dr. Leighann John saw her and Oncotype DX was done with recurrence score of 8. No chemotherapy was recommended, but she did recommend endocrine therapy with Letrozole which she started in 9/24. She was referred to radiation therapy (Dr. Pawan Gomez) and was given radiation therapy from 10/30/24-12/11/24. The treatment schedule was adapted due to her severe agoraphobia. Dr. Gomez treated the right breast and regional nodes to 22 Gy in 11 fractions in the usual fashion, then Sophia had requested treatment for 4 days per week with Wednesdays off, so the dose per fraction was increased to account for the time dose effect and she did well.     Dr. John may consider additional treatment with Abemaciclib and will discuss at a future visit. Sophia had post-surgical issues with axillary seromas and also developed axillary cording. She was seen at [place] for PT with good resolutions of her issues. (See Oncology Treatment Summary SCP for details).      Current  condition/issues: Sophia reports she is doing okay post-treatment. She notes some cold symptoms with hoarseness that she should address with her PCP. She is getting labs today while here. She had challenges with her treatment due to agoraphobia issues and the frequent visits required. She is glad to be done but did feel it was of benefit to her since it forced to her get out of her home so frequently. She continues to see her psychiatrist for medication management taking Alprazolam and Trintellix. She is interested in getting a regular counselor to help with the agoraphobia symptoms. She has noted that her panic attacks have worsened. She has periods of depression that she reports she has had for many years and are no worse than usual. She does not appear to have much support. She is pleasant and calm during our visit.    She had several PT sessions with Annita at [place] and currently has no noted arm swelling with good ROM. She has a compression sleeve to wear prn. She thinks her right breast may be more swollen since RT. She had been told to do massage prn, but is unsure how to do correctly. She should contact Annita from PT regarding these concerns and see if a visit is necessary.  She has been taking Letrozole daily since 9/24 and is tolerating well, noting mild hot flashes and hair thinning. Baseline bone density done in 10/24 showed osteopenia and she is taking Vitamin D 50863KY/week per PCP, but was unable to tolerate taking supplemental oral calcium.     She currently does no exercise but had done regular exercise after her cardiac stent placement in 2010. She has exercise equipment (treadmill) and weights at home. We reviewed importance of exercise for anxiety, depression and weight bearing and strength training for her bone health. She was encouraged to gradually start some exercise at home and we reviewed available virtual options. She has numerous food allergies and eats only specific foods that she can  tolerate. We reviewed nutrition guidelines, but she is very limited. Recommended she consider meeting with our dietician for increased food choices. Current BMI is 23. She continues to smoke 1ppd and we discussed smoking cessation options, but she is not interested at this time.     Survivorship Care Plan and letter: She was provided a hard copy of the SCP and a letter that outlined the purpose of the visit and plan.  We reviewed all elements of both documents. She is aware that a letter and SCP will be sent to her PCP, Dr. Mallory Johnson.     Reviewed Cancer Surveillance and that Dr. John and Dr. Yu will oversee this care.  She made appointment for Dr. John on 3/4/25 and may discuss extended treatment with Abemaciclib at that time. She is getting bloodwork today. We reviewed that if on Abemaciclib that her Dr. John visits and blood work may be more frequent. She should schedule bilateral mammogram in late March/early April and should schedule follow-up with Dr. Yu afterwards to review. Next bone density will be due 10/26.  Dr. John will check CT chest in 3/25 for follow-up of lung nodule.    Reviewed Schedule of other clinic visits with Primary Care and specialists: Dr. Mallory Johnson will continue to manage all general health care recommended for age and gender including cancer screening tests.  She is up-to-date with screening, due for cologuard in 6/2025. She has a change in some moles she is concerned with and will see her Dermatologist in March.  She has many of her specialists that she will be seeing (Cardiology, Opthalmology, Dermatology, Psychiatry) and Dr. Johnson all with March appointments.     Reviewed concerning symptoms that she should report to any Provider.      Reviewed possible late and long-term effects related to the treatment that was received.  We reviewed care of the surgical arm and management of hormone related side effects including vaginal dryness, if this were to occur.        Reviewed common cancer survivor issues and resources available. Reviewed psychosocial resources available virtually and in-person. I will check with our [facility] contact for counselors who can help with agoraphobia issues. Reviewed nutritional options, exercise, stress management and survivorship programs.     Reviewed Lifestyle/behaviors that can affect ongoing health, including the risk for the cancer coming back or developing another cancer.  We reviewed importance of physical activity including aerobic, strength training and weight bearing exercise.  We reviewed a plant based diet.  We reviewed skin care and sun safety. She drinks no alcohol, but does continue to smoke.     The patient received a take-home folder that included the following survivorship resources:   -SCP and patient letter  -Breast Survivorship Guide   -[facility] - nutrition and exercise classes, mind/body programs, education, support groups  -[facility] [place]-(virtual and in person options) -education, support groups, special programs, nutrition and exercise classes, movement classes, mind/body programs,  survivorship programs, individual counseling  -Smoking Cessation options  -Lymphedema Prevention  -Vaginal dryness products  -[facility]  -[employer]ent.gov handout-nutrition, physical activity  -ACS Cancer Screening Guidelines  -Websites: American Cancer Society, Living Beyond Breast Cancer, [employer] for your Life, ACS Survivorship     Sophia was given the opportunity to ask questions.  She had a few questions and verbalized understanding of the information we discussed today.  My total time spent caring for the patient on the day of the encounter: 10 minutes (60 minutes reviewing chart, 20 minutes of face to face counseling regarding survivorship education, review of care plan and additional resources and 20 minutes of documentation).  She was encouraged to call with any further questions. I will  check with our [facility] counselor here for counselor recommendations given Sophia's issues. She should follow-up with her PCP regarding her cold symptoms/hoarseness. Recommended she see our dietician at some point.  TERESA Brandon

## 2025-02-28 ENCOUNTER — TELEPHONE (OUTPATIENT)
Dept: PHYSICAL THERAPY | Facility: HOSPITAL | Age: 75
End: 2025-02-28

## 2025-02-28 ENCOUNTER — TELEPHONE (OUTPATIENT)
Age: 75
End: 2025-02-28

## 2025-02-28 NOTE — TELEPHONE ENCOUNTER
Called patient to ensure she had received message from [facility] for counseling and to check on her follow-up with Lymphedema services (Annita). Sophia is doing okay and hopes to use the counseling at some point and will call to arrange when ready. She reports that the right breast swelling appears to be increasing and Annita relayed that she should consider an evaluation with her soon. Sophia will see Dr. John on 3/4 and discuss and may need new order for PT. She was appreciate of the call. TERESA Brandon

## 2025-02-28 NOTE — PROGRESS NOTES
[facility] [place] Progress Note      Patient Name:  Sophia Rendon  YOB: 1950  Medical Record Number:  [id number]    Date of visit:  3/4/2025    CHIEF COMPLAINT: R breast ca    HPI:     74 year old that I have followed since 9/24 after she was diagnosed with right breast carcinoma following workup of self-palpated breast mass.  S/p R lumpectomy/SLN biopsy 8/24-3.6 cm grade 2 ILC, 3+LN. no distant disease on staging.  CHEK2 mutation.  Oncotype score was 8.  RT completed 12/24.  Letrozole started 9/24.  Presents for evaluation.    C/o R breast swelling. Hair thinning, hot flashes.     SOCIAL HISTORY:    .  2 children are adopted from [place] that are grown up.  Son lives with her, daughter between [place].  She is a smoker.  Suffers from PTSD and agoraphobia.    ROS:     Constitutional: no fever, chills fatigue,night sweats or weight loss  Neurologic: no headache, seizures, diplopia or weakness  Respiratory: no cough, SOB or hemoptysis  Cardiovascular: no chest pain, ankle swelling, [place]  GI: no nausea, vomiting, diarrhea or BRBPR  Musculoskeletal: no body-ache or joint pain  Dermatologic: no alopecia, rash, pruritis  : no hematuria, dysuria or frequency  Psych: no confusion or depression   Heme: no easy bruising or bleeding     ALLERGIES:  Allergies[1]    MEDICATIONS:    Current Outpatient Medications   Medication Sig Dispense Refill    Abemaciclib 150 MG Oral Tab Take 1 tablet (150 mg total) by mouth 2 (two) times daily. may take with or without food 60 tablet 11    ergocalciferol 1.25 MG (93075 UT) Oral Cap Take 1 capsule (50,000 Units total) by mouth once a week. 12 capsule 1    SYNTHROID 88 MCG Oral Tab TAKE ONE TABLET BY MOUTH DAILY BEFORE breakfast 90 tablet 3    letrozole 2.5 MG Oral Tab Take 1 tablet (2.5 mg total) by mouth daily. 30 tablet 6    Multiple Vitamins-Minerals (PRESERVISION AREDS 2 OR) Take by mouth.      Multiple Vitamins-Minerals (MULTI-VITAMIN/MINERALS)  Oral Tab Take 1 tablet by mouth daily.      fluticasone-umeclidin-vilant (TRELEGY ELLIPTA) 200-62.5-25 MCG/ACT Inhalation Aerosol Powder, Breath Activated Inhale 1 puff into the lungs daily. 3 each 0    Budesonide-Formoterol Fumarate (SYMBICORT) 160-4.5 MCG/ACT Inhalation Aerosol Inhale 2 puffs into the lungs 2 (two) times daily. 3 each 1    Butalbital-APAP-Caffeine -40 MG Oral Tab Take 1 tablet by mouth every 6 (six) hours as needed for Pain. Take one to 2 tablets as needed 30 tablet 0    Vortioxetine HBr (TRINTELLIX OR) Take 2.5 mg by mouth every other day. 1.5mg every 4 days and 2.5mg every 4 days      nitroGLYCERIN (NITROSTAT) 0.4 MG Sublingual SL Tab Place 1 tablet (0.4 mg total) under the tongue every 5 (five) minutes as needed. 30 tablet 0    aspirin EC 81 MG Oral Tab EC Take 1 tablet (81 mg total) by mouth daily. 30 tablet 11    Metoprolol Succinate ER (TOPROL XL) 25 MG Oral Take 0.5 tablets (12.5 mg total) by mouth daily. Takes 1/2 tablet (12.5 mg) daily      simvastatin (ZOCOR) 10 MG Oral Tab Take 1 tablet (10 mg total) by mouth nightly.      alprazolam (XANAX) 0.5 MG Oral Tab Take 1 tablet (0.5 mg total) by mouth. Take one tablet night PRN up to 4 tablets daily         VITALS:  Height: --  Weight: 59 kg (130 lb) (1428)  BSA (Calculated - sq m): --  Pulse: 96 (1428)  BP: 146/87 (1428)  Temp: 97.5 °F (36.4 °C) (1428)  Do Not Use - Resp Rate: --  SpO2: 93 % (1428)    PS:  ECO    PHYSICAL EXAM:    General: alert and oriented x 3, not in acute distress.  Neck: No adenopathy.  CVS: S1S2, regular  Rhythm, no murmurs.   Lungs: Clear to auscultation and percussion.  Abdomen: Soft, non tender, no hepato-splenomegaly.  Extremities:  No edema.  CNS: no focal deficit  Breasts: Right breast shows well-healed lumpectomy scar.  Some edema and postradiation changes.  No palpable masses or axillary adenopathy either side.    Emotional well being: Patient's emotional well being was  assessed.  No issues requiring acute psychosocial intervention were identified.     LABS:     No results found for this or any previous visit (from the past 24 hours).    ASSESSMENT AND PLAN:     # R breast ca (T2 N1 Mx): S/p R lumpectomy/SLN biopsy 8/24-3.6 cm grade 2 ILC, 3+ LN, largest metastasis 1.2 cm.  Multiple foci GENA. No LVI.  % ER, MD negative, HER2 negative, Ki-67 1%.  Oncotype score was 8 which corresponds to a 2% risk of distant recurrence.  RT completed.  Started anastrozole 9/24.  Continue till 9/24 given high risk disease.  Due for bilateral mammogram in late March/early April.    Also candidate for adjuvant abemaciclib for 2 years.  Side effects discussed.  Rx sent.  Will need more careful follow-up once she starts.  Discussed this in detail given her agoraphobia but she is motivated.    Also candidate for adjuvant bisphosphonates.  She has dental implants.    # CHEK 2 mutation: Continue breast cancer screening.  Colonoscopy q 5 years.    # Osteopenia: DEXA 10/24 showed osteopenia with T-score -1.4, continue calcium/vitamin D, weightbearing exercises and repeat DEXA 10/26.  Plan adjuvant bisphosphonates per ASCO guidelines.    # Abnormal CT: CT showed sclerotic lesions right iliac bone and left femur head but they were negative and bone scan, likely bone islands.    # Pulmonary nodules: Noted on CT, active smoker.  Repeat CT 3/25. CT neck in light of neck pain.     # PTSD/agoraphobia: Has met with behavioral health.    ORDERS PLACED:          Procedures    Comp Metabolic Panel (14)    CBC With Differential With Platelet    Lymphedema Clinic Referral:  PT/OT  Evaluate & Treat Lymphedema    CT CHEST(CONTRAST ONLY) (CPT=71260)    CT SOFT TISSUE OF NECK(CONTRAST ONLY) (CPT=70491)       Requested Prescriptions     Signed Prescriptions Disp Refills    Abemaciclib 150 MG Oral Tab 60 tablet 11     Sig: Take 1 tablet (150 mg total) by mouth 2 (two) times daily. may take with or without food        Return in about 3 months (around 6/4/2025) for Labs, MD visit, zometa infusion.    Leighann John M.D.    [facility]    [facility]  [address] [place], [address]    3/4/2025         [1]   Allergies  Allergen Reactions    Codeine OTHER (SEE COMMENTS)     Migraines     Epinephrine OTHER (SEE COMMENTS)     Injection    Penicillin G CARDIAC ARREST    Sulfa Antibiotics CARDIAC ARREST    Eggs Or Egg-Derived Products OTHER (SEE COMMENTS)     Makes her mucusy     Berries UNKNOWN    Chicken Allergy      And ALL FOWL type bird    Chocolate     Clindamycin OTHER (SEE COMMENTS)     .    Dairy Products     Grass      trees    Molds & Smuts     Mushrooms     Soy [Isoflavones, Soy]     Biaxin [Clarithromycin] DIARRHEA

## 2025-02-28 NOTE — TELEPHONE ENCOUNTER
Called pt regarding her concerns of breast swelling. Discussed coming in for evaluation would be beneficial. Pt wanting to talk to MD next week before scheduling.

## 2025-03-04 ENCOUNTER — OFFICE VISIT (OUTPATIENT)
Age: 75
End: 2025-03-04
Attending: FAMILY MEDICINE
Payer: MEDICARE

## 2025-03-04 VITALS
HEART RATE: 96 BPM | TEMPERATURE: 98 F | WEIGHT: 130 LBS | RESPIRATION RATE: 20 BRPM | BODY MASS INDEX: 23 KG/M2 | OXYGEN SATURATION: 93 % | SYSTOLIC BLOOD PRESSURE: 146 MMHG | DIASTOLIC BLOOD PRESSURE: 87 MMHG

## 2025-03-04 DIAGNOSIS — C77.9 REGIONAL LYMPH NODE METASTASIS PRESENT (HCC): ICD-10-CM

## 2025-03-04 DIAGNOSIS — M54.2 NECK PAIN: ICD-10-CM

## 2025-03-04 DIAGNOSIS — C50.919 INVASIVE LOBULAR CARCINOMA OF BREAST IN FEMALE (HCC): Primary | ICD-10-CM

## 2025-03-04 PROBLEM — M89.9 BONE DISORDER: Status: ACTIVE | Noted: 2025-03-04

## 2025-03-04 RX ORDER — ZOLEDRONIC ACID 0.04 MG/ML
4 INJECTION, SOLUTION INTRAVENOUS ONCE
Status: CANCELLED | OUTPATIENT
Start: 2025-03-11

## 2025-03-04 RX ORDER — ZOLEDRONIC ACID 0.04 MG/ML
4 INJECTION, SOLUTION INTRAVENOUS ONCE
OUTPATIENT
Start: 2025-03-11

## 2025-03-04 NOTE — PROGRESS NOTES
Education Record    Learner:  Patient    Disease / Diagnosis: right breast cancer       Barriers / Limitations:  None   Comments:    Method:  Discussion   Comments:    General Topics:  Plan of care reviewed   Comments:    Outcome:  Shows understanding   Comments:   Taking letrozole, reports hair loss, hot flashes.   Breast is swollen, has an appt for PT with new order. Order placed.   Reviewed when mammogram is due.   Pt has questions about additional medications recommended in prev appointment.

## 2025-03-07 ENCOUNTER — TELEPHONE (OUTPATIENT)
Dept: PHYSICAL THERAPY | Facility: HOSPITAL | Age: 75
End: 2025-03-07

## 2025-03-07 DIAGNOSIS — C50.919 INVASIVE LOBULAR CARCINOMA OF BREAST IN FEMALE (HCC): ICD-10-CM

## 2025-03-07 DIAGNOSIS — C77.9 REGIONAL LYMPH NODE METASTASIS PRESENT (HCC): ICD-10-CM

## 2025-03-12 ENCOUNTER — OFFICE VISIT (OUTPATIENT)
Dept: PHYSICAL THERAPY | Facility: HOSPITAL | Age: 75
End: 2025-03-12
Attending: INTERNAL MEDICINE
Payer: MEDICARE

## 2025-03-12 DIAGNOSIS — C50.919 INVASIVE LOBULAR CARCINOMA OF BREAST IN FEMALE (HCC): Primary | ICD-10-CM

## 2025-03-12 PROCEDURE — 97530 THERAPEUTIC ACTIVITIES: CPT | Performed by: PHYSICAL THERAPIST

## 2025-03-12 PROCEDURE — 97161 PT EVAL LOW COMPLEX 20 MIN: CPT | Performed by: PHYSICAL THERAPIST

## 2025-03-12 PROCEDURE — 97140 MANUAL THERAPY 1/> REGIONS: CPT | Performed by: PHYSICAL THERAPIST

## 2025-03-12 NOTE — PATIENT INSTRUCTIONS
Self- Lymphatic Massage for RIGHT Arm, Breast or Trunk Lymphedema     The lymphatic system is part of our circulatory system. It helps balance the fluids of our body and plays an important role in our immune function. Every day our lymphatic system absorbs fluid, protein molecules and waste. Waste products are filtered and destroyed in the lymph nodes and eliminated by the body. The remaining lymph fluid and protein molecules are filtered and cleansed in the lymph nodes and are returned to the heart to join the blood.     We have lymph nodes all over our body, but we have large clusters or chains of lymph nodes in our neck, under our arms and in our groin. Lymph fluid normally flows in certain pathways. For example, lymph fluid from your arm, trunk and breast would normally be filtered and drained by the lymph nodes under your arm. If you have had some lymph nodes removed or radiated under your arm, you may be at risk of developing swelling or lymphedema in your arm, trunk, breast, or back.     Manual Lymph Drainage, or MLD, is a specialized type of gentle massage that is designed to aid lymphatic circulation. It helps move excess lymph fluid from an area that is swollen (or is at risk of becoming swollen), such as your arm, trunk or breast, into an area where the lymph nodes are working at their full capacity, for example, to the neck, unaffected underarm or groin nodes.  The pressure of your hands on your skin should be just enough to stretch the skin. It should be very light. Hands should be flat allowing more contact with the skin. This is how you stimulate the lymph vessels. If you can feel your muscles underneath your fingers, then you are too deep.    When doing self-massage remember:  · Support your affected arm on a table or with pillows, allowing your shoulders to relax.  · Never strain your shoulders, neck, arm or hand when doing self- massage  · Keep your hands soft and relaxed, use the flats of hands  and fingers instead of finger tips  · Use a light pressure  · Gently stretch the skin as far as it goes naturally and then release.  · Massage towards the unaffected or alternate lymph nodes  · Self-massage should always be pain free.  · Self-massage may be done while seated or lying down.  · Do not do self-massage if you have an infection.     Do not stroke across your chest if you currently have a portacath or if you have had treatment for breast cancer on both breasts.            The following massage is based on Manual Lymphatic Drainage (MLD)    1. Deep Breathing  A very important part of your self-care is deep breathing. There are lymph vessels deep in the abdomen that get stimulated by the contraction of the diaphragm when we breathe deeply. You can also practice deep breathing anytime!  · Place the palms/flats of your hands on your stomach  · Slowly, breathe deeply through your nose, allowing the stomach to expand  · Breathe out slowly through pursed lips, allowing the stomach to flatten  · Repeat 5 times with a short rest between each breath to avoid dizziness      2. Start at your neck  · Place the flats your fingers on the side of your neck just above your collarbone. Some people find it more comfortable to cross their hands when they do this stroke.   · Place your 2nd and 3rd fingers just above your collarbone on either side of your neck and stretch the skin just as far as it naturally goes. Stroke down towards your collarbone and inward. This stroke would look like two “J” strokes facing one another. This motion helps lymph fluid drain back to the heart.   · Repeat 15 times. DO BOTH SIDES      3. Side of neck  · Place your flat hands on the side of your neck just under your ears and gently stretch the skin down towards your collarbone and release  · Try to stroke your neck in a slow, gentle and rhythmical manner.  · Repeat this stroke 10 to 15 times.  DO BOTH SIDES  · Remember to keep your pressure  light    4. axilla  In preparation to redirect fluid in the chest area you gently pump the underarms by   · placing your palm against your underarm rolling your palm up towards your head and into your body.   · Pump or knead your underarm about 10 to 15 times. DO BOTH SIDES          5. Chest   · Gently massage your chest - in a sweeping motion - remembering to gently stretch the skin as far as it naturally goes and release.   · Place your hand by your RIGHT underarm, above any scar and lightly sweep your hand across your chest over to your LEFT underarm.   · Repeat this motion 10 to 15 times.   · Try to find the most comfortable position for your hand without straining your wrist too much.   · You may need to use both your hands.   · Also, try to do the massage when you are comfortably warm. Our muscles are more flexible when our body is warm.      6. Groin  · We also want to direct the fluid from your upper arm and the side of your chest down the side of your body toward your groin.   · Pump, as you did with your underarm in #5, at your RIGHT groin  · Place the flats of your hands over the crease at the top of your leg and pump gently.   · Repeat 15 times        7. Chest to groin  · Massage from your RIGHT under arm, below any scar, down the side of your body to your groin area.   · Remember to stretch the skin and release.   · Repeat 10 to 15 times.    Continue with the arm comfortably supported and slightly elevated.   · If you have difficulty raising your arm, try bringing your arm forward.   · From the inner part of the RIGHT upper arm, stroke around the outside of your underarm (remember you want to re-direct fluid around the underarm where lymph nodes have been removed and/or radiated) and continue the stroke from the side of the chest downward toward the groin.   · Remember to stretch the skin gently as far as it will go.   · Repeat 15 times.          8. Breast       - If breast “firm” or “hard”, massage  breast firmly    - softly massage breast mostly towards LEFT axilla  - the outer and lower part of breast softly massage towards RIGHT groin      9. Upper Arm  · Starting with your RIGHT shoulder, gently massage towards your neck and if it is not too much of a strain on your wrist, stroke across the back of your shoulder to your neck.   · Try to gently stretch the skin, towards your neck and release.  · Repeat 15 times.     · Lymphatic fluid in the upper arm normally flows into the underarm. We want to redirect this fluid to the back of your upper arm so we can move it into your neck and opposite underarm.   · Massage your whole upper arm from the inside of your arm to the top of your upper arm.   · Repeat 15 times    · Now massage from the back of your arm to the top of your upper arm.   · Stretch the skin and release.   · Repeat 15 times      10. Arm  · Gently massage your entire RIGHT arm towards your head.   · Start with your shoulder, and then move to your upper arm elbow and forearm.   · Try to gently stretch the skin,   · push the fluid upwards and release.      11. Hand and Fingers  If your hand is swollen   · massage the back and palm of your hand.   · Gently stretch the skin, towards the forearm and release.   If your fingers are swollen   · place your index finger and thumb of your unaffected hand on the base of your finger and gently stretch the skin towards your hand.   · Massage the entire finger

## 2025-03-12 NOTE — PROGRESS NOTES
LYMPHEDEMA EVALUATION     Diagnosis:   Invasive lobular carcinoma of breast in female (HCC) (C50.919)  , I89.0 lymphedema Patient:  Sophia Rendon (74 year old, female)        Referring Provider: Heike John  Today's Date   3/12/2025    Precautions:  Drug Allergy; Other (use comment) (anxiety)   Date of Evaluation: 03/12/25  Date of Surgery: 8/16/2024     PATIENT SUMMARY   Summary of chief complaints: Swelling right breast  History of current condition: R breast cancer, 8/16/2024 lumpectomy w/ R SLNB (3+/5). Pt developed R breast and RUE swelling immediately after surgery, responded well to lymph therapy. Pt then had radiation and since swelling of right breast has increased   Pain level: current 1 /10, at best  , at worst 10 /10  Description of symptoms: \"more of an ache\"   Occupation: Retired   Leisure activities/Hobbies: Stays mostly at home   Prior level of function: independent w/ ADLs  Current limitations: Remains independent w/ ADLs but w/ swelling of R breast  Pt goals: decrease swelling  Hand Dominance: right  Do you live with someone who would be able to assist you: Yes  Self-Reported Weight: 130 lb  Are you following the recommended diet from your physician: Yes    Past medical history was reviewed with Sophia.  Significant findings include:    Sophia  has a past medical history of Anxiety state, Breast cancer (HCC), Cancer (HCC), Cataract, Colon cancer screening (12/23/2013), COPD (chronic obstructive pulmonary disease) (05/07/2014), Depression, Disorder of thyroid, Diverticulitis, Fall (11/15/2012), Goiter (12/23/2013), Graves disease, Heart attack (HCC), Heart disease, History of appendectomy (08/08/2014), History of hysterectomy (08/08/2014), Hypothyroid, Migraines, Ovarian mass, Pneumonia, Pneumonia, organism unspecified(486), Post PTCA (11/06/2014), Post traumatic stress disorder (PTSD), Pulmonary emphysema (HCC), Shortness of breath, Tobacco abuse (12/23/2013), Unspecified essential  hypertension, and Visual impairment.  She  has a past surgical history that includes tonsillectomy; angioplasty (coronary); appendectomy,w othr proc; other surgical history; other surgical history; appendectomy; dawood localization wire 1 site left (cpt=19281) (Left, 1980); hysterectomy (2012); oophorectomy (Bilateral, 2012); dawood localization wire 1 site right (cpt=19281) (Right, 1990); lumpectomy right; and sent lymph node biopsy.    ASSESSMENT  Sophia presents to physical therapy evaluation with primary c/o Swelling right breast. The results of the objective tests and measures show swelling R breast w/ skin changes.. Functional deficits include but are not limited to Remains independent w/ ADLs but w/ swelling of R breast. Signs and symptoms are consistent with a rehabilitation diagnosis of lymphedema. Pt and PT discussed evaluation findings, pathology, and POC.  Pt voiced understanding of plan of care and agrees to participate in self-care including HEP and progression towards self-management. Skilled Physical Therapy is medically necessary to address the above impairments and reach functional goals.    Reason for lymphedema: Secondary Lymphedema Cause: breast cancer  Stage of lymphedema: 2  Phase of treatment: Phase 1: Reduction Phase    OBJECTIVE:     ROM and Strength:  (* denotes performed with pain)  Shoulder AROM and strength WFL    Flexibility:    UE Flexibility R L     Upper Trap         Levator Scap         Pec Major min restricted       Scalenes         Latissimus         Bicep           Swelling       3/12/2025   Self Care   Compression Pt has OTS sleeve and gauntlet wears as needed   Exercise Pt reports she does her ROM exercises from when she had therapy before   Are you following the recommended diet from your physician? Yes   Lymph Class Secondary Lymphedema   Stage of Lymphedema 2   2nd Class Cause breast cancer   Cause breast cancer   Phase of treatment Phase 1: Reduction Phase         3/12/2025    Edema/Tissue Observations   Trunk Locations Right Breast   Edema/Tissue Observations: Right breast swelling;pitting;erthema/red;hyperkeratosis       hyperkeratosis around nipple and arereola   Stemmer's Negative   # of Trunk Measurements 2   Trunk Measurement 1 11 cm inf from sternal notch: 93 cm   Trunk Measurement 2 21 cm in from sternal notch: 95.8 cm        Stemmer Sign: Negative    Trunk Measurements       3/12/2025   Trunk Measurements   Trunk Measurement 1 11 cm inf from sternal notch: 93 cm   Trunk Measurement 2 21 cm in from sternal notch: 95.8 cm          Lymphedema Measurements       3/12/2025   Lymphedema Calculations   DATE MEASURED 3/12/2025   LOCATION/MEASUREMENTS RUE   PATIENT POSITION  supine   LIMB POSITION 75 degrees abd   STARTING POINT 17 cm from 3rd cuticle   RIGHT HAND VOLUME 18.3 across palm   LEFT HAND VOLUME 18.2 across palm   MEASUREMENT A 15   MEASUREMENT B 16.2   MEASUREMENT C 19.2   MEASUREMENT D 22   MEASUREMENT E 22.8   MEASUREMENT F 24.5   MEASUREMENT G 27.3   MEASUREMENT H 29.8   MEASUREMENT I 32.1    TOTAL VOLUME  [id number]   DATE MEASURED 3/12/2025   LOCATION/MEASUREMENTS LUE   MEASUREMENT A 14.9   MEASUREMENT B 15.8   MEASUREMENT C 18.5   MEASUREMENT D 21.3   MEASUREMENT E 23   MEASUREMENT F 24   MEASUREMENT G 27.1   MEASUREMENT H 30.8   MEASUREMENT I 31.8    TOTAL VOLUME  [id number]   % DIFFERENCE 1.60004        Neurological:  Sensation:       Gait: pt ambulates on level ground with      Today's Treatment and Response:   Pt education was provided on exam findings, treatment diagnosis, treatment plan, expectations, and prognosis.  Today's Treatment       3/12/2025   PT Treatment   Insurance Auth # and Certification Dates Certification From: 3/12/2025  To:6/10/2025   # of Visits Used/Approved 1/10   Manual Therapy MLD: Neck sequence, abd sequence, left axillary, A-A, R inguinal, R A-I, Right axillary, Right breast, RUE.    Soft debridement of hyperkeratosis R  breast    Compression:  - pt has OTS sleeve and gauntlet to use as needed  - pt wearing sports bra which is too tight around trunk. Sample light compression bra given to pt (better fit), fabricated foam for inferior breast for compression and elevation   Therapeutic Activity Instr in self MLD (verbal, demo, written)      Explained Lymphedema diagnosis; reviewed lymphatic system, informed patient of lymphedema precautions and risk reduction practices, and importance of skin care.      Manual Therapy Min 40   Therapeutic Activity Min 20   Evaluation Min 15   Total of Timed Procedures 60   Total of Service Based 15   Total Treatment Time 75         Charges:  PT EVAL: Low Complexity, mm3, act1  In agreement with evaluation findings and clinical rationale, this evaluation involved LOW COMPLEXITY decision making due to no personal factors/comorbidities, 1-2 body structures involved/activity limitations, and stable symptoms as documented in the evaluation.                                                                   PLAN OF CARE:    Goals: (to be met in 10 visits)        LYMPHEDEMA GOALS Not Met Progressing Toward Partially Met Met   Decrease swelling R breast by a total of 2 cm to decrease risk of infection       Pt to be independent with self MLD, ROM exercises, and donning/doffing compression bandages/garments to facilitate swelling reduction and to be independent at self-management once discharged.              Frequency / Duration: Patient will be seen Pt did not want to schedule appts (due to anxiety/does not like leaving the house) Discussed scheduling appts morning off on days she feels she can make it to therapy.x/week or a total of 10 visits over a 90 day period. Treatment will include: MLD; Compression; Skin care; Remedial exercise; Therapeutic exercises; Therapeutic activities; Home exercise program instructions; Self-care home management; Manual therapy    Education or treatment limitation: Other (use  comment) (anxiety)   Rehab Potential: good     LLIS SCORES    Q1-Q17 Score #of Q's Answered Raw Score Percent Impairment      13    17    0.76    0.76       Patient/Family/Caregiver was advised of these findings, precautions, and treatment options and has agreed to actively participate in planning and for this course of care.    Thank you for your referral. Please co-sign or sign and return this letter via fax as soon as possible to [phone]. If you have any questions, please contact me at Dept: [phone]    Sincerely,  Electronically signed by therapist: Annita Nobles PT  Physician's certification required: Yes  I certify the need for these services furnished under this plan of treatment and while under my care.    X___________________________________________________ Date____________________    Certification From: 3/12/2025  To:6/10/2025

## 2025-03-19 ENCOUNTER — HOSPITAL ENCOUNTER (OUTPATIENT)
Dept: CT IMAGING | Facility: HOSPITAL | Age: 75
Discharge: HOME | End: 2025-03-19
Attending: INTERNAL MEDICINE
Payer: MEDICARE

## 2025-03-19 ENCOUNTER — APPOINTMENT (OUTPATIENT)
Dept: CT IMAGING | Facility: HOSPITAL | Age: 75
End: 2025-03-19
Attending: INTERNAL MEDICINE
Payer: MEDICARE

## 2025-03-19 DIAGNOSIS — M54.2 NECK PAIN: ICD-10-CM

## 2025-03-19 DIAGNOSIS — C50.919 INVASIVE LOBULAR CARCINOMA OF BREAST IN FEMALE (HCC): ICD-10-CM

## 2025-03-19 PROCEDURE — 71260 CT THORAX DX C+: CPT | Performed by: INTERNAL MEDICINE

## 2025-03-19 PROCEDURE — 70491 CT SOFT TISSUE NECK W/DYE: CPT | Performed by: INTERNAL MEDICINE

## 2025-03-24 ENCOUNTER — TELEPHONE (OUTPATIENT)
Age: 75
End: 2025-03-24

## 2025-03-24 ENCOUNTER — APPOINTMENT (OUTPATIENT)
Age: 75
End: 2025-03-24
Attending: FAMILY MEDICINE
Payer: MEDICARE

## 2025-03-24 NOTE — TELEPHONE ENCOUNTER
FYI- Pt called to reschedule her appt with Angeles Marli today. Pt stated she has a migraine and didn't get much sleep last night.     I asked pt how she's feeling now and she stated like she got ran over by a truck bc she hasn't had any sleep. I did ask pt how is the migraine and she said it's working it's self down and it had been awhile since she had a migraine. Pt states the migraine isn't cancer related    Rescheduled pt edu 3/28/ at 2pm, I offered Wed after her PCP appt and pt declined stated she'd like to come in Friday    FYI-just wanted to report

## 2025-03-24 NOTE — TELEPHONE ENCOUNTER
Toxicities: Letrozole    Patient due in for chemo education today for Verzenio.     Migraine Headache    Sophia reports she has a history of migraine headaches. She woke up with one at 3 am today. She has not slept much. She took her Fioricet and the headache pain is getting better. She also has photophobia. No nausea or vomiting.    Sophia has rescheduled her chemo education appointment for 3/28/2025 with RONEL Reynoso

## 2025-03-26 ENCOUNTER — OFFICE VISIT (OUTPATIENT)
Dept: FAMILY MEDICINE CLINIC | Facility: CLINIC | Age: 75
End: 2025-03-26
Payer: MEDICARE

## 2025-03-26 VITALS
HEART RATE: 71 BPM | BODY MASS INDEX: 23 KG/M2 | WEIGHT: 130 LBS | OXYGEN SATURATION: 96 % | SYSTOLIC BLOOD PRESSURE: 136 MMHG | RESPIRATION RATE: 18 BRPM | DIASTOLIC BLOOD PRESSURE: 70 MMHG

## 2025-03-26 DIAGNOSIS — F40.00 AGORAPHOBIA: ICD-10-CM

## 2025-03-26 DIAGNOSIS — C50.919 INVASIVE LOBULAR CARCINOMA OF BREAST IN FEMALE (HCC): ICD-10-CM

## 2025-03-26 DIAGNOSIS — G72.9 MYOPATHY: ICD-10-CM

## 2025-03-26 DIAGNOSIS — E03.8 OTHER SPECIFIED HYPOTHYROIDISM: ICD-10-CM

## 2025-03-26 DIAGNOSIS — F33.1 MODERATE RECURRENT MAJOR DEPRESSION (HCC): ICD-10-CM

## 2025-03-26 DIAGNOSIS — J44.9 CHRONIC OBSTRUCTIVE PULMONARY DISEASE, UNSPECIFIED COPD TYPE (HCC): ICD-10-CM

## 2025-03-26 DIAGNOSIS — M65.321 TRIGGER INDEX FINGER OF RIGHT HAND: Primary | ICD-10-CM

## 2025-03-26 PROCEDURE — 3078F DIAST BP <80 MM HG: CPT | Performed by: FAMILY MEDICINE

## 2025-03-26 PROCEDURE — 99214 OFFICE O/P EST MOD 30 MIN: CPT | Performed by: FAMILY MEDICINE

## 2025-03-26 PROCEDURE — 1159F MED LIST DOCD IN RCRD: CPT | Performed by: FAMILY MEDICINE

## 2025-03-26 PROCEDURE — 1160F RVW MEDS BY RX/DR IN RCRD: CPT | Performed by: FAMILY MEDICINE

## 2025-03-26 PROCEDURE — 3075F SYST BP GE 130 - 139MM HG: CPT | Performed by: FAMILY MEDICINE

## 2025-03-26 RX ORDER — FLUTICASONE FUROATE, UMECLIDINIUM BROMIDE AND VILANTEROL TRIFENATATE 200; 62.5; 25 UG/1; UG/1; UG/1
1 POWDER RESPIRATORY (INHALATION) DAILY
Qty: 3 EACH | Refills: 0 | Status: SHIPPED | OUTPATIENT
Start: 2025-03-26

## 2025-03-26 NOTE — PROGRESS NOTES
The following individual(s) verbally consented to be recorded using ambient AI listening technology and understand that they can each withdraw their consent to this listening technology at any point by asking the clinician to turn off or pause the recording:    Patient name: Sophia Rendon  Additional names:  na    HPI:   Sophia Rendon is a 74 year old female here to follow up with     History of Present Illness  Sophia Rendon is a 74 year old female who presents for a follow-up visit.    She has lymphedema in her right breast, described as 'big, hard, and heavy.' She has been performing massages but feels it may not be enough. Previously, physical therapy was effective in reducing the swelling to almost normal. Radiation was performed on the affected breast.    She recently learned she will need to take a chemotherapy pill and has been on letrozole since December. She is concerned about the side effects of chemotherapy, including hair loss, and mentions that she will be on this treatment for two years.    She experiences agoraphobia, which significantly impacts her daily life, making her unable to leave her home or change out of her pajamas. She has not connected with a therapist despite seeing a psychiatrist regularly. Her son has expressed concern about her condition worsening.    She continues to smoke and reports no other complaints at this time.    She has a history of adverse reactions to multivitamins containing iron, which cause severe abdominal pain and diarrhea. She avoids these and opts for multivitamins without iron.    She uses Trelegy for breathing issues, which she takes as needed when experiencing chest tightness. She has not needed to use nitroglycerin recently. She is allergic to several foods, including berries, eggs, and certain fruits, which complicates her dietary choices.        Current Outpatient Medications   Medication Sig Dispense Refill    Abemaciclib 150 MG Oral Tab Take 1  tablet (150 mg total) by mouth 2 (two) times daily. may take with or without food 60 tablet 11    ergocalciferol 1.25 MG (21253 UT) Oral Cap Take 1 capsule (50,000 Units total) by mouth once a week. 12 capsule 1    SYNTHROID 88 MCG Oral Tab TAKE ONE TABLET BY MOUTH DAILY BEFORE breakfast 90 tablet 3    letrozole 2.5 MG Oral Tab Take 1 tablet (2.5 mg total) by mouth daily. 30 tablet 6    Multiple Vitamins-Minerals (PRESERVISION AREDS 2 OR) Take by mouth.      Multiple Vitamins-Minerals (MULTI-VITAMIN/MINERALS) Oral Tab Take 1 tablet by mouth daily.      fluticasone-umeclidin-vilant (TRELEGY ELLIPTA) 200-62.5-25 MCG/ACT Inhalation Aerosol Powder, Breath Activated Inhale 1 puff into the lungs daily. 3 each 0    Budesonide-Formoterol Fumarate (SYMBICORT) 160-4.5 MCG/ACT Inhalation Aerosol Inhale 2 puffs into the lungs 2 (two) times daily. 3 each 1    Butalbital-APAP-Caffeine -40 MG Oral Tab Take 1 tablet by mouth every 6 (six) hours as needed for Pain. Take one to 2 tablets as needed 30 tablet 0    Vortioxetine HBr (TRINTELLIX OR) Take 2.5 mg by mouth every other day. 1.5mg every 4 days and 2.5mg every 4 days      nitroGLYCERIN (NITROSTAT) 0.4 MG Sublingual SL Tab Place 1 tablet (0.4 mg total) under the tongue every 5 (five) minutes as needed. 30 tablet 0    aspirin EC 81 MG Oral Tab EC Take 1 tablet (81 mg total) by mouth daily. 30 tablet 11    Metoprolol Succinate ER (TOPROL XL) 25 MG Oral Take 0.5 tablets (12.5 mg total) by mouth daily. Takes 1/2 tablet (12.5 mg) daily      simvastatin (ZOCOR) 10 MG Oral Tab Take 1 tablet (10 mg total) by mouth nightly.      alprazolam (XANAX) 0.5 MG Oral Tab Take 1 tablet (0.5 mg total) by mouth. Take one tablet night PRN up to 4 tablets daily        Past Medical History:    Anxiety state    Chronic PTSD    Breast cancer (HCC)    Cancer (HCC)    Cataract    Colon cancer screening    COPD (chronic obstructive pulmonary disease)    Depression    Disorder of thyroid     Diverticulitis    Fall    Goiter    Graves disease    Heart attack (HCC)    4 stents    Heart disease    History of appendectomy    History of hysterectomy    Hypothyroid    Migraines    Ovarian mass    Pneumonia    Pneumonia, organism unspecified(486)    Post PTCA    Post traumatic stress disorder (PTSD)    Pulmonary emphysema (HCC)    Shortness of breath    Tobacco abuse    Unspecified essential hypertension    Visual impairment    glasses      Past Surgical History:   Procedure Laterality Date    Angioplasty (coronary)      With STENT    Appendectomy      Appendectomy,w othr proc      right OOPHORECTOMY    Hysterectomy  2012    total hystero.    Lumpectomy right      Susie localization wire 1 site left (cpt=19281) Left 1980    benign.    Susie localization wire 1 site right (cpt=19281) Right 1990    benign.    Oophorectomy Bilateral 2012    total hystero.    Other surgical history      Fibroadenoma    Other surgical history      Ruptured cyst- right ovary    Sent lymph node biopsy      Tonsillectomy        Family History   Problem Relation Age of Onset    Psychiatric Mother         Severe Depression, anxiety    Cancer Mother         Heart    Other (Other) Mother         Hypothyroidism    Endocrine Disorder Father         cushing's disease    Other (Other) Father     Other (CHEK-2+) Sister     Breast Cancer Sister 65    Other (Other) Other         stroke, a-fib    Migraines Other       Social History:   Social History     Socioeconomic History    Marital status:     Number of children: 2   Occupational History    Occupation:     Occupation: RETIRED   Tobacco Use    Smoking status: Every Day     Current packs/day: 1.00     Types: Cigarettes    Smokeless tobacco: Never   Vaping Use    Vaping status: Never Used   Substance and Sexual Activity    Alcohol use: Not Currently     Comment: minimal    Drug use: No   Other Topics Concern    Caffeine Concern No    Exercise Yes   Social History  Narrative    , lives with son    Has 2 adopted children - son and daughter (does not speak to daughter)     Occ: . Children: 2  Retired   Exercise: minimal.  Diet: watches sugar closely     REVIEW OF SYSTEMS:   GENERAL: feels well otherwise  SKIN: denies any unusual skin lesions  EYES:denies blurred vision or double vision  HEENT: denies nasal congestion, sinus pain or ST  LUNGS: denies shortness of breath with exertion  CARDIOVASCULAR: denies chest pain on exertion  GI: denies abdominal pain,denies heartburn  : denies dysuria, vaginal discharge or itching   MUSCULOSKELETAL: denies back pain  NEURO: denies headaches  PSYCHE: denies depression or anxiety  HEMATOLOGIC: denies hx of anemia  ENDOCRINE: denies thyroid history  ALL/ASTHMA: + COPD     EXAM:   /70   Pulse 71   Resp 18   Wt 130 lb (59 kg)   SpO2 96%   BMI 23.03 kg/m²   Body mass index is 23.03 kg/m².   GENERAL: alert and oriented X 3, well developed, well nourished,in no apparent distress  CARDIO: RRR without murmur  LUNGS: expiratory wheezing   NECK: supple,no adenopathy,no thyromegaly  HEENT: atraumatic, normocephalic,ears and throat are clear  EYES:PERRLA, EOMI, normal,conjunctiva are clear  SKIN: norashes,no suspicious lesions  CHEST: no chest tenderness  MUSCULOSKELETAL: back is not tender,FROM of the back  EXTREMITIES: no cyanosis, clubbing or edema  NEURO: cranial nerves are intact,motor and sensory are grossly intact    ASSESSMENT AND PLAN:   Sophia Rendon is a 74 year old female here with     Assessment & Plan  Breast cancer with lymph node involvement  Breast cancer with three cancerous lymph nodes. Currently on letrozole and will start chemotherapy pills. She is concerned about chemotherapy side effects, including alopecia and gastrointestinal symptoms. She is apprehensive about the regimen, which includes oral chemotherapy for two years and biannual IV therapy to mitigate estrogen inhibitor effects on bones.  -  Attend chemotherapy education session on Friday.  - Initiate chemotherapy pills as prescribed.  - Schedule IV therapy in June and December to counteract estrogen inhibitor effects on bones.    Lymphedema of the right breast  Chronic lymphedema in the right breast, likely secondary to previous radiation therapy. Reports swelling, heaviness, and hardness. Massage therapy has been beneficial when managed by a physical therapist.  - Recommend returning to physical therapy for lymphedema management.    Agoraphobia  Significant agoraphobia impacting daily activities. She has not been connected with a therapist despite regular psychiatric care. Frustrated with previous social work support.  - Contact [facility] to provide names of therapists specializing in agoraphobia.    Gallbladder disease  Gallbladder disease managed with a low-fat diet. Advised to seek emergency care if experiencing pain and fever due to sepsis risk from a blocked duct.  - Advise to seek emergency care if experiencing pain and fever.    Iron intolerance  Severe gastrointestinal symptoms, including abdominal pain and diarrhea, with multivitamins containing iron. Tolerates multivitamins without iron.  - Avoid multivitamins with iron.    Nutritional concerns  Multiple food allergies and dietary restrictions affecting nutritional intake. Advised to avoid sugar due to cancer concerns and focus on high-protein foods. Reports difficulty with appetite and eating.  - Consider [employer] for protein intake.  - Encourage a balanced diet with a focus on high-protein foods.    Smoking  Continues to smoke despite advice to quit. Smoking cessation is crucial, especially with upcoming chemotherapy treatment.  - Encourage smoking cessation.      1. Trigger index finger of right hand      2. Myopathy  Intolerance     3. Chronic obstructive pulmonary disease, unspecified COPD type (HCC)    - fluticasone-umeclidin-vilant (TRELEGY ELLIPTA) 200-62.5-25 MCG/ACT Inhalation  Aerosol Powder, Breath Activated; Inhale 1 puff into the lungs daily.  Dispense: 3 each; Refill: 0    4. Invasive lobular carcinoma of breast in female (HCC)  See oncology    5. Other specified hypothyroidism  Cpm     6. Agoraphobia    - [facility] Referral - In Network    7. Moderate recurrent major depression (HCC)    - [facility] Referral - In Network        RTC in 6 mo or sooner if needed  Questions answered. Patient expressed understanding

## 2025-03-28 ENCOUNTER — OFFICE VISIT (OUTPATIENT)
Age: 75
End: 2025-03-28
Attending: FAMILY MEDICINE
Payer: MEDICARE

## 2025-03-28 DIAGNOSIS — C50.919 INVASIVE LOBULAR CARCINOMA OF BREAST IN FEMALE (HCC): Primary | ICD-10-CM

## 2025-03-28 DIAGNOSIS — Z71.9 HEALTH EDUCATION/COUNSELING: ICD-10-CM

## 2025-03-28 RX ORDER — ONDANSETRON 8 MG/1
8 TABLET, ORALLY DISINTEGRATING ORAL EVERY 8 HOURS PRN
Qty: 30 TABLET | Refills: 0 | Status: SHIPPED | OUTPATIENT
Start: 2025-03-28

## 2025-03-28 NOTE — PROGRESS NOTES
ORAL CHEMOTHERAPY EDUCATION RECORD  Learner:  Sophia Rendon     Diagnosis:   breast cancer     Medication Name:   Verzenio    Administration Guidelines:  Take With Food                  Drug / Drug Interactions:  reviewed     Start Date of Treatment:  pending gallbladder surgery. Pt will call the office or send [employer] message after Monday's appointment with the surgeon.     Chemo Toxicities:  Per [facility]      When to Call the Office:  Fevers  - 100.5 or greater  Nausea /vomiting not controlled with anti-nausea medications  Diarrhea -not controlled with Imodium AD or more than 6 episodes in 24 hours  Constipation - No BM x 3 days, no responsive to stool softeners or laxatives  Shortness of Breath  Excessive fatigue or weakness  New onset rash  Sudden onset of any unexpected symptom - such as change in vision, sense of balance, dizziness or lightheadedness, swelling one arm or leg                  Safe Handling / Disposal:  This was reviewed with the patient and handout provided.                  Missed Dose Management:  Call office     What Phone Number to Call: [phone]    Teaching Materials Provided:   Oral Chemotherapy information sheets  When to contact the Treatment Team Information Sheet  Side Effect Management Information Sheet  [facility] Dietician information sheet  [facility] Services Sheet      Patient was given ample opportunity to ask questions. All questions and concerns addressed. We discussed self care techniques, symptom management, fluid, diet, and activities.         Chemotherapy education video provided to the patient to view prior to the education visit.     1.Did the patient watch the video before the appointment? yes    2. Was the video helpful? The portion that explained safe handling of the drug was helpful.          Chemotherapy Consent Form signed by the patient.      Encounter Times  PreCharting:   minutes    Reviewing/Obtainin minutes      Medical Exam:   minutes    Plan:    minutes      Notes:   minutes    Counseling/Education: 30 minutes      Referring/Communicating:   minutes    Ind Interpretation:   minutes      Care Coordination:   minutes       My total time spent caring for the patient on the day of the encounter: 35 minutes.         Rosemarie MOORE - BC  Nurse Practitioner  [facility]

## 2025-03-31 ENCOUNTER — OFFICE VISIT (OUTPATIENT)
Facility: LOCATION | Age: 75
End: 2025-03-31
Payer: MEDICARE

## 2025-03-31 VITALS
SYSTOLIC BLOOD PRESSURE: 135 MMHG | HEART RATE: 88 BPM | OXYGEN SATURATION: 97 % | TEMPERATURE: 97 F | DIASTOLIC BLOOD PRESSURE: 83 MMHG

## 2025-03-31 DIAGNOSIS — K80.20 SYMPTOMATIC CHOLELITHIASIS: Primary | ICD-10-CM

## 2025-03-31 DIAGNOSIS — K80.50 BILIARY COLIC: ICD-10-CM

## 2025-03-31 PROCEDURE — 1159F MED LIST DOCD IN RCRD: CPT | Performed by: STUDENT IN AN ORGANIZED HEALTH CARE EDUCATION/TRAINING PROGRAM

## 2025-03-31 PROCEDURE — 3079F DIAST BP 80-89 MM HG: CPT | Performed by: STUDENT IN AN ORGANIZED HEALTH CARE EDUCATION/TRAINING PROGRAM

## 2025-03-31 PROCEDURE — 99212 OFFICE O/P EST SF 10 MIN: CPT | Performed by: STUDENT IN AN ORGANIZED HEALTH CARE EDUCATION/TRAINING PROGRAM

## 2025-03-31 PROCEDURE — 3075F SYST BP GE 130 - 139MM HG: CPT | Performed by: STUDENT IN AN ORGANIZED HEALTH CARE EDUCATION/TRAINING PROGRAM

## 2025-03-31 NOTE — PROGRESS NOTES
Patient ID: Sophia Rendon is a 74 year old female.    Chief Complaint   Patient presents with    Follow - Up     EP - to discuss gallbladder procedure, pt reports no symptoms.         HPI: Sophia Rendon is a 74 year old female presents for follow-up.  Patient's last appointment was in June of last year.  At that time, she was diagnosed with a breast mass.  Since then, she has undergone surgery for invasive lobular carcinoma.  She has been recovering well.  Her oncologist, Dr. John, wants to begin chemotherapy.  Patient does endorse more symptoms from her gallbladder.  She continues to experience abdominal pain and some nausea.    Workup: None new      Past Medical History  Past Medical History:    Allergic rhinitis    Anxiety    Anxiety state    Chronic PTSD    Breast cancer (HCC)    Cancer (HCC)    Cataract    Colon cancer screening    COPD (chronic obstructive pulmonary disease)    Depression    Disorder of thyroid    Diverticulitis    Fall    Goiter    Graves disease    Heart attack (HCC)    4 stents    Heart disease    History of appendectomy    History of hysterectomy    Hyperthyroidism    Graves Disease    Hypothyroid    Hypothyroidism    Migraines    Ovarian mass    Pneumonia    Pneumonia, organism unspecified(486)    Post PTCA    Post traumatic stress disorder (PTSD)    Pulmonary emphysema (HCC)    Shortness of breath    Tobacco abuse    Unspecified essential hypertension    Visual impairment    glasses       Past Surgical History  Past Surgical History:   Procedure Laterality Date    Angioplasty (coronary)      With STENT    Appendectomy      Appendectomy,w othr proc      right OOPHORECTOMY    Breast biopsy  1980 & 1990    Fibroadenomas    Hysterectomy  2012    total hystero.    Lumpectomy left  1980    Fibroadenoma    Lumpectomy right      Susie localization wire 1 site left (cpt=19281) Left 1980    benign.    Susie localization wire 1 site right (cpt=19281) Right 1990    benign.    Oophorectomy  Bilateral 2012    total hystero.    Other surgical history      Fibroadenoma    Other surgical history      Ruptured cyst- right ovary    Replacement prosthetic aortic valve w/ cardiopulmonary bypass; w/ stent< tissue valve   & 2-2014    3 in 2010 & 1 in 2014    Sent lymph node biopsy      Tonsillectomy         Medications  Current Outpatient Medications   Medication Sig Dispense Refill    ondansetron 8 MG Oral Tablet Dispersible Take 1 tablet (8 mg total) by mouth every 8 (eight) hours as needed for Nausea. 30 tablet 0    fluticasone-umeclidin-vilant (TRELEGY ELLIPTA) 200-62.5-25 MCG/ACT Inhalation Aerosol Powder, Breath Activated Inhale 1 puff into the lungs daily. 3 each 0    ergocalciferol 1.25 MG (57483 UT) Oral Cap Take 1 capsule (50,000 Units total) by mouth once a week. 12 capsule 1    SYNTHROID 88 MCG Oral Tab TAKE ONE TABLET BY MOUTH DAILY BEFORE breakfast 90 tablet 3    letrozole 2.5 MG Oral Tab Take 1 tablet (2.5 mg total) by mouth daily. 30 tablet 6    Multiple Vitamins-Minerals (PRESERVISION AREDS 2 OR) Take by mouth.      Multiple Vitamins-Minerals (MULTI-VITAMIN/MINERALS) Oral Tab Take 1 tablet by mouth daily.      Budesonide-Formoterol Fumarate (SYMBICORT) 160-4.5 MCG/ACT Inhalation Aerosol Inhale 2 puffs into the lungs 2 (two) times daily. 3 each 1    Butalbital-APAP-Caffeine -40 MG Oral Tab Take 1 tablet by mouth every 6 (six) hours as needed for Pain. Take one to 2 tablets as needed 30 tablet 0    Vortioxetine HBr (TRINTELLIX OR) Take 2.5 mg by mouth every other day. 1.5mg every 4 days and 2.5mg every 4 days      nitroGLYCERIN (NITROSTAT) 0.4 MG Sublingual SL Tab Place 1 tablet (0.4 mg total) under the tongue every 5 (five) minutes as needed. 30 tablet 0    aspirin EC 81 MG Oral Tab EC Take 1 tablet (81 mg total) by mouth daily. 30 tablet 11    Metoprolol Succinate ER (TOPROL XL) 25 MG Oral Take 0.5 tablets (12.5 mg total) by mouth daily. Takes 1/2 tablet (12.5 mg) daily       simvastatin (ZOCOR) 10 MG Oral Tab Take 1 tablet (10 mg total) by mouth nightly.      alprazolam (XANAX) 0.5 MG Oral Tab Take 1 tablet (0.5 mg total) by mouth. Take one tablet night PRN up to 4 tablets daily      Abemaciclib 150 MG Oral Tab Take 1 tablet (150 mg total) by mouth 2 (two) times daily. may take with or without food (Patient not taking: Reported on 3/31/2025) 60 tablet 11       Allergies  Allergies[1]    Social History  History   Smoking Status    Every Day    Types: Cigarettes   Smokeless Tobacco    Never     History   Alcohol Use Not Currently     Comment: minimal     History   Drug Use Unknown       Family History  Family History   Problem Relation Age of Onset    Psychiatric Mother         Severe Depression, anxiety    Cancer Mother         Heart    Other (Other) Mother         Hypothyroidism    Heart Disease Mother     Endocrine Disorder Father         cushing's disease    Other (Other) Father     Other (CHEK-2+) Sister     Breast Cancer Sister 65        L breast- invasive ductal & lobular cancer    Other (Other) Other         stroke, a-fib    Migraines Other     Breast Cancer Sister         Double mastectomy        Review of Systems  Review of Systems   Constitutional: Negative.    Respiratory: Negative.     Cardiovascular: Negative.    Gastrointestinal:  Positive for abdominal distention, abdominal pain and nausea. Negative for constipation, diarrhea and vomiting.       Exam  Vitals:    03/31/25 1130   BP: 135/83   Pulse: 88   Temp: 97.3 °F (36.3 °C)     Physical Exam  Constitutional:       Appearance: Normal appearance.   Cardiovascular:      Rate and Rhythm: Normal rate.   Pulmonary:      Effort: Pulmonary effort is normal.   Abdominal:      General: Abdomen is flat. There is no distension.      Palpations: Abdomen is soft.      Tenderness: There is no abdominal tenderness.   Skin:     General: Skin is warm and dry.   Neurological:      Mental Status: She is alert and oriented to person,  place, and time.           Assessment/Plan  Assessment   Problem List Items Addressed This Visit          Gastrointestinal and Abdominal    Symptomatic cholelithiasis - Primary     Other Visit Diagnoses       Biliary colic        Relevant Orders    Dr. Goodman's Surgical Case Request (do not use for updates/changes within 48 hrs of procedure)            Sophia Rendon is a 74 year old female with biliary colic      Ultrasound images reviewed again by me  Ultrasound shows gallbladder with lots of sludge and stones  Patient continues to experience symptoms  She will also undergo chemotherapy treatment for breast cancer  I recommend proceeding with cholecystectomy  We will plan for laparoscopic cholecystectomy with ICG guidance  Procedure explained to the patient  Risks including bleeding, pain, infection, bile leak, injury to the common bile duct, and need for further intervention all discussed with the patient  Postoperative restrictions also discussed, these include no heavy lifting greater than 15 to 20 pounds for 4 weeks  Patient acknowledged understanding and wishes to proceed  Patient will need medical and cardiac clearance    Patient can call the office for any questions or concerns  Patient is to go to the emergency room for any worsening abdominal pain, nausea, vomiting, and fever as this may be acute cholecystitis and patient may need surgery sooner      Latia Goodman MD  General Surgery  [facility]     CC:  Mallory Johnson DO         [1]   Allergies  Allergen Reactions    Codeine OTHER (SEE COMMENTS)     Migraines     Epinephrine OTHER (SEE COMMENTS)     Injection    Penicillin G CARDIAC ARREST    Sulfa Antibiotics CARDIAC ARREST    Eggs Or Egg-Derived Products OTHER (SEE COMMENTS)     Makes her mucusy     Berries UNKNOWN    Chicken Allergy      And ALL FOWL type bird    Chocolate     Clindamycin OTHER (SEE COMMENTS)     .    Dairy Products     Grass      trees    Molds & Smuts     Mushrooms      Soy [Isoflavones, Soy]     Biaxin [Clarithromycin] DIARRHEA    Daily Multi-Vitamins-Iron DIARRHEA

## 2025-04-03 ENCOUNTER — APPOINTMENT (OUTPATIENT)
Dept: PHYSICAL THERAPY | Facility: HOSPITAL | Age: 75
End: 2025-04-03
Attending: INTERNAL MEDICINE
Payer: MEDICARE

## 2025-04-03 ENCOUNTER — TELEPHONE (OUTPATIENT)
Facility: LOCATION | Age: 75
End: 2025-04-03

## 2025-04-03 NOTE — TELEPHONE ENCOUNTER
Fax received from [facility]:  \"She is at acceptable cardiac risk for cholecystectomy\"    Faxed to [facility], and copy placed in Clearance binder.

## 2025-04-04 ENCOUNTER — PATIENT MESSAGE (OUTPATIENT)
Dept: FAMILY MEDICINE CLINIC | Facility: CLINIC | Age: 75
End: 2025-04-04

## 2025-04-07 ENCOUNTER — TELEPHONE (OUTPATIENT)
Facility: LOCATION | Age: 75
End: 2025-04-07

## 2025-04-07 NOTE — TELEPHONE ENCOUNTER
Pt saw you 3/26 for appt but NOT FOR CLEARANCE    Surgeon did not request this clearance until after her appt (see letter tab 3/31)    Are you willing to addend her note for surgery or does she need another appt?     Please advise either way thx

## 2025-04-07 NOTE — TELEPHONE ENCOUNTER
Cardiac Clearance received from Eveline Robertson MD.  Per note \"She is at acceptable cardiac risk for cholecystectomy\"  Faxed to [facility] and placed in binder.

## 2025-04-08 ENCOUNTER — APPOINTMENT (OUTPATIENT)
Dept: ADMINISTRATIVE | Facility: HOSPITAL | Age: 75
End: 2025-04-08
Payer: MEDICARE

## 2025-04-08 RX ORDER — HEPARIN SODIUM 5000 [USP'U]/ML
5000 INJECTION, SOLUTION INTRAVENOUS; SUBCUTANEOUS ONCE
Status: CANCELLED | OUTPATIENT
Start: 2025-04-08 | End: 2025-04-08

## 2025-04-09 ENCOUNTER — TELEPHONE (OUTPATIENT)
Facility: LOCATION | Age: 75
End: 2025-04-09

## 2025-04-09 DIAGNOSIS — K80.50 BILIARY COLIC: Primary | ICD-10-CM

## 2025-04-09 NOTE — TELEPHONE ENCOUNTER
PILO SCRUGGS Patient  Member ID  [id number]    Date of Birth  1950-11-17    Gender  Female    Eligibility Status  Active Coverage    Group Number  [id number]    Plan / Coverage Date  2018-01-01    Transaction Type  Outpatient Authorization/Referral    Organization  [facility]    Payer  [employer]    [employer] logo     Certificate Information  Reference Number  NA    Status  NO ACTION REQUIRED    [employer] Record Number  [id number]    Message  For Member contracts which cover this service, no prior authorization is necessary. Please contact [employer] Customer Service at the number on the back of the Member ID card.  Member Information  Patient Name  PILO SCRUGGS    Patient Date of Birth  1950-11-17    Patient Gender  Female    Member ID  [id number]    Relationship to Subscriber  Self    Subscriber Name  [employer] SCRUGGS    Requesting Provider     Name  FRED BRISCOE    NPBEATRIZ  [id number]    Tax Id  [id number]    Specialty  [id number]    Provider Role  Provider    Address  [address] [place] [address]    Phone  [phone]    Fax  [phone]    Contact Name  KARL SARAH    Service Information  Service Type  2 - Surgical    Place of Service  22 - [facility]    Service From - To Date  2025-04-24 - 2025-07-31    Quantity  1 Units  Procedure Code 1 (CPT/HCPCS)  89877 - LAPARO CHOLECYSTECTOMY/GRAPH    Quantity  1 Units

## 2025-04-10 ENCOUNTER — OFFICE VISIT (OUTPATIENT)
Dept: PHYSICAL THERAPY | Facility: HOSPITAL | Age: 75
End: 2025-04-10
Attending: INTERNAL MEDICINE
Payer: MEDICARE

## 2025-04-10 PROCEDURE — 97530 THERAPEUTIC ACTIVITIES: CPT | Performed by: PHYSICAL THERAPIST

## 2025-04-10 PROCEDURE — 97140 MANUAL THERAPY 1/> REGIONS: CPT | Performed by: PHYSICAL THERAPIST

## 2025-04-10 NOTE — PROGRESS NOTES
Patient: Sophia Rendon (74 year old, female) Referring Provider:  Insurance:   Diagnosis: Invasive lobular carcinoma of breast in female (HCC) (C50.919)  , I89.0 lymphedema [facility]   Date of Surgery: 8/16/2024  N/A   Precautions:  Drug Allergy; Other (use comment) (anxiety)  Referral Information:    Date of Evaluation: Req: 8, Auth: 8, Exp: 6/12/2025 03/12/25 POC Auth Visits:          Today's Date   4/10/2025    Subjective  \"I'm trying to the massage\"       Pain: 1/10     Objective      Trunk Measurements       3/12/2025 4/10/2025   Trunk Measurements   Trunk Measurement 1 11 cm inf from sternal notch: 93 cm 11 cm inf from sternal notch: 92.8 (IE 93 cm)   Trunk Measurement 2 21 cm in from sternal notch: 95.8 cm 21 cm in from sternal notch: 95.5 CM (IE 95.8 cm)       Assessment  Good reduction of swelling visibly noted w/ MLD    Goals (to be met in 10 visits)      LYMPHEDEMA GOALS Not Met Progressing Toward Partially Met Met   Decrease swelling R breast by a total of 2 cm to decrease risk of infection       Pt to be independent with self MLD, ROM exercises, and donning/doffing compression bandages/garments to facilitate swelling reduction and to be independent at self-management once discharged.                  Plan  Cont w/ CDT (as pt tolerates due to her anxiety)    Treatment Last 4 Visits  Treatment Day: 2       3/12/2025 4/10/2025   PT Treatment   Insurance Auth # and Certification Dates Certification From: 3/12/2025  To:6/10/2025 Certification From: 3/12/2025  To:6/10/2025   # of Visits Used/Approved 1/10 2/10   Manual Therapy MLD: Neck sequence, abd sequence, left axillary, A-A, R inguinal, R A-I, Right axillary, Right breast, RUE.    Soft debridement of hyperkeratosis R breast    Compression:  - pt has OTS sleeve and gauntlet to use as needed  - pt wearing sports bra which is too tight around trunk. Sample light compression bra given to pt (better fit), fabricated foam for inferior  breast for compression and elevation MLD: Neck sequence, abd sequence, left axillary, A-A, R inguinal, R A-I, Right axillary, Right breast    Compression:  - pt wearing bra w/ compression pads     Therapeutic Activity Instr in self MLD (verbal, demo, written)      Explained Lymphedema diagnosis; reviewed lymphatic system, informed patient of lymphedema precautions and risk reduction practices, and importance of skin care.    Reviewed self MLD   Therapeutic Exercise Min  10   Manual Therapy Min 40 40   Therapeutic Activity Min 20    Evaluation Min 15    Total of Timed Procedures 60 50   Total of Service Based 15 0   Total Treatment Time 75 50        HEP       Charges     mm2, act1

## 2025-04-17 ENCOUNTER — EKG ENCOUNTER (OUTPATIENT)
Dept: LAB | Age: 75
End: 2025-04-17
Attending: STUDENT IN AN ORGANIZED HEALTH CARE EDUCATION/TRAINING PROGRAM
Payer: MEDICARE

## 2025-04-17 ENCOUNTER — OFFICE VISIT (OUTPATIENT)
Dept: FAMILY MEDICINE CLINIC | Facility: CLINIC | Age: 75
End: 2025-04-17
Payer: MEDICARE

## 2025-04-17 VITALS
WEIGHT: 130 LBS | SYSTOLIC BLOOD PRESSURE: 140 MMHG | DIASTOLIC BLOOD PRESSURE: 76 MMHG | BODY MASS INDEX: 25.52 KG/M2 | RESPIRATION RATE: 18 BRPM | HEIGHT: 60 IN | HEART RATE: 78 BPM | OXYGEN SATURATION: 98 %

## 2025-04-17 DIAGNOSIS — F40.00 AGORAPHOBIA: ICD-10-CM

## 2025-04-17 DIAGNOSIS — K80.20 GALLSTONES: ICD-10-CM

## 2025-04-17 DIAGNOSIS — E03.8 OTHER SPECIFIED HYPOTHYROIDISM: ICD-10-CM

## 2025-04-17 DIAGNOSIS — C50.919 INVASIVE LOBULAR CARCINOMA OF BREAST IN FEMALE (HCC): ICD-10-CM

## 2025-04-17 DIAGNOSIS — F33.1 MODERATE RECURRENT MAJOR DEPRESSION (HCC): ICD-10-CM

## 2025-04-17 DIAGNOSIS — Z01.818 PREOP EXAMINATION: Primary | ICD-10-CM

## 2025-04-17 DIAGNOSIS — F41.9 ANXIETY: ICD-10-CM

## 2025-04-17 DIAGNOSIS — K80.50 BILIARY COLIC: ICD-10-CM

## 2025-04-17 DIAGNOSIS — Z72.0 TOBACCO ABUSE: ICD-10-CM

## 2025-04-17 DIAGNOSIS — I70.0 THORACIC AORTA ATHEROSCLEROSIS: ICD-10-CM

## 2025-04-17 DIAGNOSIS — I25.118 CORONARY ARTERY DISEASE OF NATIVE HEART WITH STABLE ANGINA PECTORIS, UNSPECIFIED VESSEL OR LESION TYPE: ICD-10-CM

## 2025-04-17 DIAGNOSIS — J44.9 CHRONIC OBSTRUCTIVE PULMONARY DISEASE, UNSPECIFIED COPD TYPE (HCC): ICD-10-CM

## 2025-04-17 DIAGNOSIS — I20.89 STABLE ANGINA: ICD-10-CM

## 2025-04-17 LAB
ATRIAL RATE: 67 BPM
ERYTHROCYTE [DISTWIDTH] IN BLOOD BY AUTOMATED COUNT: 14.2 %
HCT VFR BLD AUTO: 46.9 % (ref 35–48)
HGB BLD-MCNC: 15.7 G/DL (ref 12–16)
MCH RBC QN AUTO: 31.7 PG (ref 26–34)
MCHC RBC AUTO-ENTMCNC: 33.5 G/DL (ref 31–37)
MCV RBC AUTO: 94.7 FL (ref 80–100)
P AXIS: 67 DEGREES
P-R INTERVAL: 182 MS
PLATELET # BLD AUTO: 228 10(3)UL (ref 150–450)
Q-T INTERVAL: 414 MS
QRS DURATION: 104 MS
QTC CALCULATION (BEZET): 437 MS
R AXIS: -60 DEGREES
RBC # BLD AUTO: 4.95 X10(6)UL (ref 3.8–5.3)
T AXIS: 42 DEGREES
VENTRICULAR RATE: 67 BPM
WBC # BLD AUTO: 6.2 X10(3) UL (ref 4–11)

## 2025-04-17 PROCEDURE — 3008F BODY MASS INDEX DOCD: CPT | Performed by: FAMILY MEDICINE

## 2025-04-17 PROCEDURE — 93005 ELECTROCARDIOGRAM TRACING: CPT

## 2025-04-17 PROCEDURE — 3077F SYST BP >= 140 MM HG: CPT | Performed by: FAMILY MEDICINE

## 2025-04-17 PROCEDURE — 99215 OFFICE O/P EST HI 40 MIN: CPT | Performed by: FAMILY MEDICINE

## 2025-04-17 PROCEDURE — 85027 COMPLETE CBC AUTOMATED: CPT

## 2025-04-17 PROCEDURE — 1159F MED LIST DOCD IN RCRD: CPT | Performed by: FAMILY MEDICINE

## 2025-04-17 PROCEDURE — 1160F RVW MEDS BY RX/DR IN RCRD: CPT | Performed by: FAMILY MEDICINE

## 2025-04-17 PROCEDURE — 93010 ELECTROCARDIOGRAM REPORT: CPT | Performed by: INTERNAL MEDICINE

## 2025-04-17 PROCEDURE — 3078F DIAST BP <80 MM HG: CPT | Performed by: FAMILY MEDICINE

## 2025-04-17 PROCEDURE — 36415 COLL VENOUS BLD VENIPUNCTURE: CPT

## 2025-04-17 NOTE — PROGRESS NOTES
The following individual(s) verbally consented to be recorded using ambient AI listening technology and understand that they can each withdraw their consent to this listening technology at any point by asking the clinician to turn off or pause the recording:    Patient name: Sophia Rendon  Additional names:  n/a    Sophia Rendon is a 74 year old female who presents for preop clearance    History of Present Illness  Sophia Rendon is a 74-year-old female presenting for pre-surgical clearance for a cholecystectomy scheduled on April 24th.    She has no history of adverse reactions to anesthesia and has received clearance from her cardiologist.     She experiences chronic shortness of breath due to COPD  She manages her COPD with Symbicort and Trelegy.    She is on simvastatin for hyperlipidemia and attributes her body aches to this medication. She also takes letrozole and receives an IV treatment every six months for a bone disorder to enhance bone density.    She is not currently taking aspirin, ibuprofen, Motrin, Aleve, or any vitamins. For migraines, she uses butalbital as needed. She takes nitroglycerin as needed and ondansetron for nausea related to chemotherapy. Her ongoing medications include thyroid medication, metoprolol, Trintellix, and Xanax as needed.      Dr. Crockett requesting clearance.  4/24/2025  At [facility]   HPI:   Pt complains of gallstones.  Pt denies any chest pain/sob/dizziness or lightheadedness at rest or with exertion.  No previous reaction or problems with anesthesia.    Wt Readings from Last 6 Encounters:   04/17/25 130 lb (59 kg)   04/08/25 130 lb (59 kg)   03/26/25 130 lb (59 kg)   03/04/25 130 lb (59 kg)   10/29/24 132 lb (59.9 kg)   09/24/24 133 lb (60.3 kg)     Body mass index is 25.39 kg/m².     Cholesterol, Total (mg/dL)   Date Value   02/20/2025 141   09/04/2024 141   02/29/2024 149     CHOLESTEROL (mg/dL)   Date Value   04/12/2014 110   06/22/2013 114    03/23/2013 107     HDL Cholesterol (mg/dL)   Date Value   02/20/2025 54   09/04/2024 52   02/29/2024 51     HDL CHOL (mg/dL)   Date Value   04/12/2014 48   06/22/2013 52   03/23/2013 46     LDL Cholesterol (mg/dL)   Date Value   02/20/2025 63   09/04/2024 66   02/29/2024 71     LDL CHOLESTROL (mg/dL)   Date Value   04/12/2014 37   06/22/2013 36   03/23/2013 37     AST (U/L)   Date Value   02/20/2025 24   09/04/2024 18   04/24/2024 23   04/12/2014 14 (L)   06/22/2013 12 (L)   03/23/2013 12 (L)     ALT (U/L)   Date Value   02/20/2025 16   09/04/2024 16   04/24/2024 19   04/12/2014 19   06/22/2013 21   03/23/2013 15      Current Medications[1]   Past Medical History[2]   Past Surgical History[3]   Family History[4]   Social History:  Short Social Hx on File[5]      Retired      REVIEW OF SYSTEMS:   GENERAL: feels well otherwise  SKIN: denies any unusual skin lesions  EYES:denies blurred vision or double vision  HEENT: denies nasal congestion, sinus pain or ST  LUNGS: denies shortness of breath with exertion  CARDIOVASCULAR: denies chest pain on exertion  GI: denies abdominal pain,denies heartburn  : denies nocturia or changes in stream  MUSCULOSKELETAL: denies back pain  NEURO: denies headaches  PSYCHE: denies depression or anxiety  HEMATOLOGIC: denies hx of anemia  ENDOCRINE:  thyroid history  ALL/ASTHMA: COPD    EXAM:   /80   Pulse 78   Resp 18   Ht 5' (1.524 m)   Wt 130 lb (59 kg)   SpO2 98%   BMI 25.39 kg/m²   Body mass index is 25.39 kg/m².   GENERAL: well developed, well nourished,in no apparent distress  SKIN: no rashes,no suspicious lesions  HEENT: atraumatic, normocephalic,ears and throat are clear  EYES:PERRLA, EOMI, normal optic disk,conjunctiva are clear  NECK: supple,no adenopathy,no bruits, no JVD  CHEST: no chest tenderness  BREAST: no dominant or suspicious mass  LUNGS: clear to auscultation  CARDIO: RRR without murmur  GI: good BS's,no masses, HSM or tenderness  MUSCULOSKELETAL:  back is not tender,FROM of the back  EXTREMITIES: no cyanosis, clubbing or edema  NEURO: Oriented times three,cranial nerves are intact,motor and sensory are grossly intact    ASSESSMENT AND PLAN:   Sophia Rendon is a 74 year old female who presents for preop clearance.     Assessment & Plan  Pre-surgical clearance for cholecystectomy  Scheduled for cholecystectomy on April 24th. Cleared by cardiologist. No previous anesthesia reactions. Stopped aspirin and vitamins as advised. No chest pain. Chronic shortness of breath due to COPD. Pre-surgical labs and EKG needed. Discussed variability in surgeon's pre-op requirements and insurance coverage issues.  - Order EKG and CBC  - Ensure completion of pre-surgical labs    Chronic Obstructive Pulmonary Disease (COPD)  Chronic shortness of breath attributed to COPD. Oxygen saturation was 99%, dropped to 98%.  - Continue Symbicort and Trelegy    Osteoporosis  Bone disorder likely osteoporosis. Scheduled for IV treatment to increase bone density in June. On estrogen inhibitor, contributing to bone density issues.  - Administer IV treatment for bone density in June    Medication Management  On multiple medications including simvastatin, letrozole, metoprolol, and Trintellix. Stopped aspirin and vitamins as per pre-surgical instructions. Uses butalbital for migraines, nitroglycerin as needed. Not on ondansetron as it is for chemotherapy-related nausea.  - Continue simvastatin, letrozole, metoprolol, and Trintellix  - Use butalbital as needed for migraines  - Use nitroglycerin as needed  - Hold aspirin and vitamins until after surgery    Goals of Care  Expressed concerns about chemotherapy and its impact on immune system. Prefers less dramatic interventions but is committed to surgery and potential chemotherapy. Values having multiple plans and prefers to avoid chemotherapy if possible.  - Discuss chemotherapy options and potential side effects    Follow-up  Needs to complete  pre-surgical testing and follow up post-surgery.  - Complete pre-surgical testing including EKG and CBC  - Follow up post-surgery    1. Preop examination  Cleared for surgery     2. Gallstones      3. Chronic obstructive pulmonary disease, unspecified COPD type (HCC)  cpm    4. Invasive lobular carcinoma of breast in female (HCC)  See oncology     5. Other specified hypothyroidism  cpm    6. Agoraphobia  See psychiatry     7. Moderate recurrent major depression (HCC)  See psychiatry     8. Stable angina  See cardiology / cleared bycardiology     9. Thoracic aorta atherosclerosis  See cardiology     10. Coronary artery disease of native heart with stable angina pectoris, unspecified vessel or lesion type  See cardiology     11. Anxiety  See psychiatry     12. Tobacco abuse  Stable monitor         Pt is high risk for a low-moderate risk procedure.   Cleared for surgery.   RTC 3 mo or sooner if needed.           [1]   Current Outpatient Medications   Medication Sig Dispense Refill    ondansetron 8 MG Oral Tablet Dispersible Take 1 tablet (8 mg total) by mouth every 8 (eight) hours as needed for Nausea. 30 tablet 0    fluticasone-umeclidin-vilant (TRELEGY ELLIPTA) 200-62.5-25 MCG/ACT Inhalation Aerosol Powder, Breath Activated Inhale 1 puff into the lungs daily. 3 each 0    Abemaciclib 150 MG Oral Tab Take 1 tablet (150 mg total) by mouth 2 (two) times daily. may take with or without food (Patient taking differently: Take 1 tablet (150 mg total) by mouth 2 (two) times daily. may take with or without food.  hasn't started this med yet. Will start after cholecystectomy on 4-24-25 upon doctor's orders) 60 tablet 11    ergocalciferol 1.25 MG (91546 UT) Oral Cap Take 1 capsule (50,000 Units total) by mouth once a week. 12 capsule 1    SYNTHROID 88 MCG Oral Tab TAKE ONE TABLET BY MOUTH DAILY BEFORE breakfast 90 tablet 3    letrozole 2.5 MG Oral Tab Take 1 tablet (2.5 mg total) by mouth daily. 30 tablet 6    Multiple  Vitamins-Minerals (PRESERVISION AREDS 2 OR) Take by mouth.      Multiple Vitamins-Minerals (MULTI-VITAMIN/MINERALS) Oral Tab Take 1 tablet by mouth daily.      Budesonide-Formoterol Fumarate (SYMBICORT) 160-4.5 MCG/ACT Inhalation Aerosol Inhale 2 puffs into the lungs 2 (two) times daily. 3 each 1    Butalbital-APAP-Caffeine -40 MG Oral Tab Take 1 tablet by mouth every 6 (six) hours as needed for Pain. Take one to 2 tablets as needed 30 tablet 0    Vortioxetine HBr (TRINTELLIX OR) Take 2.5 mg by mouth every other day. 1.5mg every 4 days and 2.5mg every 4 days      nitroGLYCERIN (NITROSTAT) 0.4 MG Sublingual SL Tab Place 1 tablet (0.4 mg total) under the tongue every 5 (five) minutes as needed. 30 tablet 0    aspirin EC 81 MG Oral Tab EC Take 1 tablet (81 mg total) by mouth daily. 30 tablet 11    Metoprolol Succinate ER (TOPROL XL) 25 MG Oral Take 0.5 tablets (12.5 mg total) by mouth daily. Takes 1/2 tablet (12.5 mg) daily      simvastatin (ZOCOR) 10 MG Oral Tab Take 1 tablet (10 mg total) by mouth nightly.      alprazolam (XANAX) 0.5 MG Oral Tab Take 1 tablet (0.5 mg total) by mouth. Take one tablet night PRN up to 4 tablets daily     [2]   Past Medical History:   Allergic rhinitis    Anxiety    Anxiety state    Chronic PTSD    Breast cancer (HCC)    Cancer (HCC)    Cataract    Colon cancer screening    COPD (chronic obstructive pulmonary disease)    Depression    Disorder of thyroid    Diverticulitis    Fall    Goiter    Graves disease    Heart attack (HCC)    4 stents    Heart disease    History of appendectomy    History of hysterectomy    Hyperthyroidism    Graves Disease    Hypothyroid    Hypothyroidism    Migraines    Ovarian mass    Pneumonia    Pneumonia, organism unspecified(486)    Post PTCA    Post traumatic stress disorder (PTSD)    Pulmonary emphysema (HCC)    Shortness of breath    Tobacco abuse    Unspecified essential hypertension    Visual impairment    glasses   [3]   Past Surgical  History:  Procedure Laterality Date    Angioplasty (coronary)      With STENT    Appendectomy      Appendectomy,w othr proc      right OOPHORECTOMY    Breast biopsy  1980 & 1990    Fibroadenomas    Hysterectomy  2012    total hystero.    Lumpectomy left  1980    Fibroadenoma    Lumpectomy right  08/2024    with 5 lymph nodes removed    Susie localization wire 1 site left (cpt=19281) Left 1980    benign.    Susie localization wire 1 site right (cpt=19281) Right 1990    benign.    Oophorectomy Bilateral 2012    total hystero.    Other surgical history      Fibroadenoma    Other surgical history      Ruptured cyst- right ovary    Replacement prosthetic aortic valve w/ cardiopulmonary bypass; w/ stent< tissue valve   & 2-2014    3 in 2010 & 1 in 2014    Sent lymph node biopsy      Tonsillectomy     [4]   Family History  Problem Relation Age of Onset    Psychiatric Mother         Severe Depression, anxiety    Cancer Mother         Heart    Other (Other) Mother         Hypothyroidism    Heart Disease Mother     Endocrine Disorder Father         cushing's disease    Other (Other) Father     Other (CHEK-2+) Sister     Breast Cancer Sister 65        L breast- invasive ductal & lobular cancer    Other (Other) Other         stroke, a-fib    Migraines Other     Breast Cancer Sister         Double mastectomy    [5]   Social History  Socioeconomic History    Marital status:     Number of children: 2   Occupational History    Occupation:     Occupation: RETIRED   Tobacco Use    Smoking status: Every Day     Current packs/day: 1.00     Average packs/day: 1 pack/day for 21.0 years (21.0 ttl pk-yrs)     Types: Cigarettes    Smokeless tobacco: Never    Tobacco comments:     quit once & want to quit again   Vaping Use    Vaping status: Never Used   Substance and Sexual Activity    Alcohol use: Not Currently     Comment: minimal    Drug use: Never   Other Topics Concern    Caffeine Concern No     Stress Concern Yes    Weight Concern No    Special Diet Yes     Comment: Try to eat a heart healthy diet    Exercise Yes     Comment: Plan on doing more    Seat Belt Yes     Comment: Always   Social History Narrative    , lives with son    Has 2 adopted children - son and daughter (does not speak to daughter)

## 2025-04-18 ENCOUNTER — APPOINTMENT (OUTPATIENT)
Dept: PHYSICAL THERAPY | Facility: HOSPITAL | Age: 75
End: 2025-04-18
Attending: INTERNAL MEDICINE
Payer: MEDICARE

## 2025-04-18 RX ORDER — LETROZOLE 2.5 MG/1
2.5 TABLET, FILM COATED ORAL DAILY
Qty: 30 TABLET | Refills: 6 | Status: SHIPPED | OUTPATIENT
Start: 2025-04-18

## 2025-04-21 ENCOUNTER — TELEPHONE (OUTPATIENT)
Facility: LOCATION | Age: 75
End: 2025-04-21

## 2025-04-21 NOTE — TELEPHONE ENCOUNTER
Patient completed pre-op clearance appt with PCP Dr. Johnson on 4/17/25:  Pt is high risk for a low-moderate risk procedure.   Cleared for surgery.    OV notes and EKG tracings faxed to [facility], and copy placed in Clearance binder.

## 2025-04-24 ENCOUNTER — ANESTHESIA (OUTPATIENT)
Dept: SURGERY | Facility: HOSPITAL | Age: 75
End: 2025-04-24
Payer: MEDICARE

## 2025-04-24 ENCOUNTER — HOSPITAL ENCOUNTER (OUTPATIENT)
Facility: HOSPITAL | Age: 75
Setting detail: HOSPITAL OUTPATIENT SURGERY
Discharge: HOME | End: 2025-04-24
Attending: STUDENT IN AN ORGANIZED HEALTH CARE EDUCATION/TRAINING PROGRAM | Admitting: STUDENT IN AN ORGANIZED HEALTH CARE EDUCATION/TRAINING PROGRAM
Payer: MEDICARE

## 2025-04-24 ENCOUNTER — ANESTHESIA EVENT (OUTPATIENT)
Dept: SURGERY | Facility: HOSPITAL | Age: 75
End: 2025-04-24
Payer: MEDICARE

## 2025-04-24 VITALS
OXYGEN SATURATION: 95 % | HEIGHT: 60 IN | RESPIRATION RATE: 20 BRPM | BODY MASS INDEX: 25.91 KG/M2 | WEIGHT: 132 LBS | TEMPERATURE: 97 F | SYSTOLIC BLOOD PRESSURE: 119 MMHG | HEART RATE: 61 BPM | DIASTOLIC BLOOD PRESSURE: 58 MMHG

## 2025-04-24 DIAGNOSIS — K80.50 BILIARY COLIC: Primary | ICD-10-CM

## 2025-04-24 PROCEDURE — 47562 LAPAROSCOPIC CHOLECYSTECTOMY: CPT | Performed by: STUDENT IN AN ORGANIZED HEALTH CARE EDUCATION/TRAINING PROGRAM

## 2025-04-24 PROCEDURE — 47562 LAPAROSCOPIC CHOLECYSTECTOMY: CPT

## 2025-04-24 RX ORDER — GLYCOPYRROLATE 0.2 MG/ML
INJECTION, SOLUTION INTRAMUSCULAR; INTRAVENOUS AS NEEDED
Status: DISCONTINUED | OUTPATIENT
Start: 2025-04-24 | End: 2025-04-24 | Stop reason: SURG

## 2025-04-24 RX ORDER — NEOSTIGMINE METHYLSULFATE 1 MG/ML
INJECTION INTRAVENOUS AS NEEDED
Status: DISCONTINUED | OUTPATIENT
Start: 2025-04-24 | End: 2025-04-24 | Stop reason: SURG

## 2025-04-24 RX ORDER — BUPIVACAINE HYDROCHLORIDE 2.5 MG/ML
INJECTION, SOLUTION EPIDURAL; INFILTRATION; INTRACAUDAL; PERINEURAL AS NEEDED
Status: DISCONTINUED | OUTPATIENT
Start: 2025-04-24 | End: 2025-04-24 | Stop reason: HOSPADM

## 2025-04-24 RX ORDER — HYDROCODONE BITARTRATE AND ACETAMINOPHEN 5; 325 MG/1; MG/1
1 TABLET ORAL ONCE AS NEEDED
Status: DISCONTINUED | OUTPATIENT
Start: 2025-04-24 | End: 2025-04-24

## 2025-04-24 RX ORDER — INDOCYANINE GREEN AND WATER 25 MG
KIT INJECTION
Status: COMPLETED
Start: 2025-04-24 | End: 2025-04-24

## 2025-04-24 RX ORDER — HYDROMORPHONE HYDROCHLORIDE 1 MG/ML
INJECTION, SOLUTION INTRAMUSCULAR; INTRAVENOUS; SUBCUTANEOUS
Status: COMPLETED
Start: 2025-04-24 | End: 2025-04-24

## 2025-04-24 RX ORDER — HYDROCODONE BITARTRATE AND ACETAMINOPHEN 5; 325 MG/1; MG/1
2 TABLET ORAL ONCE AS NEEDED
Status: DISCONTINUED | OUTPATIENT
Start: 2025-04-24 | End: 2025-04-24

## 2025-04-24 RX ORDER — METOCLOPRAMIDE HYDROCHLORIDE 5 MG/ML
10 INJECTION INTRAMUSCULAR; INTRAVENOUS EVERY 8 HOURS PRN
Status: DISCONTINUED | OUTPATIENT
Start: 2025-04-24 | End: 2025-04-24

## 2025-04-24 RX ORDER — ONDANSETRON 4 MG/1
4 TABLET, ORALLY DISINTEGRATING ORAL EVERY 8 HOURS PRN
Qty: 10 TABLET | Refills: 0 | Status: SHIPPED | OUTPATIENT
Start: 2025-04-24

## 2025-04-24 RX ORDER — ONDANSETRON 2 MG/ML
4 INJECTION INTRAMUSCULAR; INTRAVENOUS EVERY 6 HOURS PRN
Status: DISCONTINUED | OUTPATIENT
Start: 2025-04-24 | End: 2025-04-24

## 2025-04-24 RX ORDER — ACETAMINOPHEN 500 MG
1000 TABLET ORAL EVERY 6 HOURS PRN
COMMUNITY

## 2025-04-24 RX ORDER — HYDROMORPHONE HYDROCHLORIDE 1 MG/ML
0.4 INJECTION, SOLUTION INTRAMUSCULAR; INTRAVENOUS; SUBCUTANEOUS EVERY 5 MIN PRN
Status: DISCONTINUED | OUTPATIENT
Start: 2025-04-24 | End: 2025-04-24

## 2025-04-24 RX ORDER — HYDROMORPHONE HYDROCHLORIDE 1 MG/ML
0.2 INJECTION, SOLUTION INTRAMUSCULAR; INTRAVENOUS; SUBCUTANEOUS EVERY 5 MIN PRN
Status: DISCONTINUED | OUTPATIENT
Start: 2025-04-24 | End: 2025-04-24

## 2025-04-24 RX ORDER — MIDAZOLAM HYDROCHLORIDE 1 MG/ML
INJECTION INTRAMUSCULAR; INTRAVENOUS
Status: COMPLETED
Start: 2025-04-24 | End: 2025-04-24

## 2025-04-24 RX ORDER — KETOROLAC TROMETHAMINE 30 MG/ML
INJECTION, SOLUTION INTRAMUSCULAR; INTRAVENOUS AS NEEDED
Status: DISCONTINUED | OUTPATIENT
Start: 2025-04-24 | End: 2025-04-24 | Stop reason: SURG

## 2025-04-24 RX ORDER — MIDAZOLAM HYDROCHLORIDE 1 MG/ML
INJECTION INTRAMUSCULAR; INTRAVENOUS AS NEEDED
Status: DISCONTINUED | OUTPATIENT
Start: 2025-04-24 | End: 2025-04-24 | Stop reason: SURG

## 2025-04-24 RX ORDER — DEXAMETHASONE SODIUM PHOSPHATE 4 MG/ML
VIAL (ML) INJECTION AS NEEDED
Status: DISCONTINUED | OUTPATIENT
Start: 2025-04-24 | End: 2025-04-24 | Stop reason: SURG

## 2025-04-24 RX ORDER — SODIUM CHLORIDE, SODIUM LACTATE, POTASSIUM CHLORIDE, CALCIUM CHLORIDE 600; 310; 30; 20 MG/100ML; MG/100ML; MG/100ML; MG/100ML
INJECTION, SOLUTION INTRAVENOUS CONTINUOUS
Status: DISCONTINUED | OUTPATIENT
Start: 2025-04-24 | End: 2025-04-24

## 2025-04-24 RX ORDER — EPHEDRINE SULFATE 50 MG/ML
INJECTION INTRAVENOUS AS NEEDED
Status: DISCONTINUED | OUTPATIENT
Start: 2025-04-24 | End: 2025-04-24 | Stop reason: SURG

## 2025-04-24 RX ORDER — LABETALOL HYDROCHLORIDE 5 MG/ML
INJECTION, SOLUTION INTRAVENOUS AS NEEDED
Status: DISCONTINUED | OUTPATIENT
Start: 2025-04-24 | End: 2025-04-24 | Stop reason: SURG

## 2025-04-24 RX ORDER — LIDOCAINE HYDROCHLORIDE 10 MG/ML
INJECTION, SOLUTION EPIDURAL; INFILTRATION; INTRACAUDAL; PERINEURAL AS NEEDED
Status: DISCONTINUED | OUTPATIENT
Start: 2025-04-24 | End: 2025-04-24 | Stop reason: SURG

## 2025-04-24 RX ORDER — MIDAZOLAM HYDROCHLORIDE 1 MG/ML
1 INJECTION INTRAMUSCULAR; INTRAVENOUS EVERY 5 MIN PRN
Status: DISCONTINUED | OUTPATIENT
Start: 2025-04-24 | End: 2025-04-24

## 2025-04-24 RX ORDER — LABETALOL HYDROCHLORIDE 5 MG/ML
5 INJECTION, SOLUTION INTRAVENOUS EVERY 5 MIN PRN
Status: DISCONTINUED | OUTPATIENT
Start: 2025-04-24 | End: 2025-04-24

## 2025-04-24 RX ORDER — PHENYLEPHRINE HCL 10 MG/ML
VIAL (ML) INJECTION AS NEEDED
Status: DISCONTINUED | OUTPATIENT
Start: 2025-04-24 | End: 2025-04-24 | Stop reason: SURG

## 2025-04-24 RX ORDER — ACETAMINOPHEN 500 MG
1000 TABLET ORAL ONCE AS NEEDED
Status: DISCONTINUED | OUTPATIENT
Start: 2025-04-24 | End: 2025-04-24

## 2025-04-24 RX ORDER — ACETAMINOPHEN 500 MG
1000 TABLET ORAL ONCE
Status: DISCONTINUED | OUTPATIENT
Start: 2025-04-24 | End: 2025-04-24 | Stop reason: HOSPADM

## 2025-04-24 RX ORDER — OXYCODONE HYDROCHLORIDE 5 MG/1
2.5 TABLET ORAL EVERY 6 HOURS PRN
Qty: 10 TABLET | Refills: 0 | Status: SHIPPED | OUTPATIENT
Start: 2025-04-24

## 2025-04-24 RX ORDER — NALOXONE HYDROCHLORIDE 0.4 MG/ML
80 INJECTION, SOLUTION INTRAMUSCULAR; INTRAVENOUS; SUBCUTANEOUS AS NEEDED
Status: DISCONTINUED | OUTPATIENT
Start: 2025-04-24 | End: 2025-04-24

## 2025-04-24 RX ORDER — METOCLOPRAMIDE HYDROCHLORIDE 5 MG/ML
INJECTION INTRAMUSCULAR; INTRAVENOUS AS NEEDED
Status: DISCONTINUED | OUTPATIENT
Start: 2025-04-24 | End: 2025-04-24

## 2025-04-24 RX ORDER — ROCURONIUM BROMIDE 10 MG/ML
INJECTION, SOLUTION INTRAVENOUS AS NEEDED
Status: DISCONTINUED | OUTPATIENT
Start: 2025-04-24 | End: 2025-04-24 | Stop reason: SURG

## 2025-04-24 RX ORDER — ONDANSETRON 2 MG/ML
INJECTION INTRAMUSCULAR; INTRAVENOUS AS NEEDED
Status: DISCONTINUED | OUTPATIENT
Start: 2025-04-24 | End: 2025-04-24 | Stop reason: SURG

## 2025-04-24 RX ORDER — INDOCYANINE GREEN AND WATER 25 MG
5 KIT INJECTION ONCE
Status: COMPLETED | OUTPATIENT
Start: 2025-04-24 | End: 2025-04-24

## 2025-04-24 RX ORDER — HYDROMORPHONE HYDROCHLORIDE 1 MG/ML
0.6 INJECTION, SOLUTION INTRAMUSCULAR; INTRAVENOUS; SUBCUTANEOUS EVERY 5 MIN PRN
Status: DISCONTINUED | OUTPATIENT
Start: 2025-04-24 | End: 2025-04-24

## 2025-04-24 RX ADMIN — PHENYLEPHRINE HCL 100 MCG: 10 MG/ML VIAL (ML) INJECTION at 11:10:00

## 2025-04-24 RX ADMIN — PHENYLEPHRINE HCL 100 MCG: 10 MG/ML VIAL (ML) INJECTION at 11:20:00

## 2025-04-24 RX ADMIN — PHENYLEPHRINE HCL 100 MCG: 10 MG/ML VIAL (ML) INJECTION at 11:03:00

## 2025-04-24 RX ADMIN — LIDOCAINE HYDROCHLORIDE 50 MG: 10 INJECTION, SOLUTION EPIDURAL; INFILTRATION; INTRACAUDAL; PERINEURAL at 11:03:00

## 2025-04-24 RX ADMIN — EPHEDRINE SULFATE 5 MG: 50 INJECTION INTRAVENOUS at 11:20:00

## 2025-04-24 RX ADMIN — DEXAMETHASONE SODIUM PHOSPHATE 4 MG: 4 MG/ML VIAL (ML) INJECTION at 11:15:00

## 2025-04-24 RX ADMIN — ROCURONIUM BROMIDE 50 MG: 10 INJECTION, SOLUTION INTRAVENOUS at 11:03:00

## 2025-04-24 RX ADMIN — LABETALOL HYDROCHLORIDE 5 MG: 5 INJECTION, SOLUTION INTRAVENOUS at 11:35:00

## 2025-04-24 RX ADMIN — PHENYLEPHRINE HCL 100 MCG: 10 MG/ML VIAL (ML) INJECTION at 11:25:00

## 2025-04-24 RX ADMIN — NEOSTIGMINE METHYLSULFATE 5 MG: 1 INJECTION INTRAVENOUS at 12:00:00

## 2025-04-24 RX ADMIN — SODIUM CHLORIDE, SODIUM LACTATE, POTASSIUM CHLORIDE, CALCIUM CHLORIDE: 600; 310; 30; 20 INJECTION, SOLUTION INTRAVENOUS at 10:57:00

## 2025-04-24 RX ADMIN — KETOROLAC TROMETHAMINE 30 MG: 30 INJECTION, SOLUTION INTRAMUSCULAR; INTRAVENOUS at 11:55:00

## 2025-04-24 RX ADMIN — GLYCOPYRROLATE 0.8 MG: 0.2 INJECTION, SOLUTION INTRAMUSCULAR; INTRAVENOUS at 12:00:00

## 2025-04-24 RX ADMIN — ONDANSETRON 4 MG: 2 INJECTION INTRAMUSCULAR; INTRAVENOUS at 11:55:00

## 2025-04-24 RX ADMIN — MIDAZOLAM HYDROCHLORIDE 2 MG: 1 INJECTION INTRAMUSCULAR; INTRAVENOUS at 10:58:00

## 2025-04-24 RX ADMIN — LABETALOL HYDROCHLORIDE 5 MG: 5 INJECTION, SOLUTION INTRAVENOUS at 11:40:00

## 2025-04-24 NOTE — ANESTHESIA POSTPROCEDURE EVALUATION
[facility] Patient Status:  Hospital Outpatient Surgery   Age/Gender 74 year old female MRN [id number]   Location [facility] POST ANESTHESIA CARE UNIT Attending Latia Goodman MD   Hosp Day # 0 PCP Mallory Johnson DO       Anesthesia Post-op Note    LAPAROSCOPIC CHOLECYSTECTOMY, WITH INJECTION OF INDOCYANINE GREEN (ICG)    Procedure Summary       Date: 04/24/25 Room / Location:  MAIN OR [address] [place] [address] MAIN OR    Anesthesia Start: 1057 Anesthesia Stop: 1229    Procedure: LAPAROSCOPIC CHOLECYSTECTOMY, WITH INJECTION OF INDOCYANINE GREEN (ICG) (Abdomen) Diagnosis:       Biliary colic      (Biliary colic [K80.50])    Surgeons: Latia Goodman MD Anesthesiologist: Anastacio Barragan MD    Anesthesia Type: general ASA Status: 2            Anesthesia Type: general    Vitals Value Taken Time   /86 04/24/25 12:30   Temp 98.3 °F (36.8 °C) 04/24/25 12:30   Pulse 66 04/24/25 12:30   Resp 21 04/24/25 12:30   SpO2 100 % 04/24/25 12:30   Vitals shown include unfiled device data.        Patient Location: PACU    Anesthesia Type: general    Airway Patency: patent and extubated    Postop Pain Control: adequate    Mental Status: mildly sedated but able to meaningfully participate in the post-anesthesia evaluation    Nausea/Vomiting: none    Cardiopulmonary/Hydration status: stable euvolemic    Complications: no apparent anesthesia related complications    Postop vital signs: stable    Dental Exam: Unchanged from Preop    Patient to be discharged from PACU when criteria met.

## 2025-04-24 NOTE — OPERATIVE REPORT
OPERATIVE NOTE    Sophia Rendon Location: OR   CSN [id number] MRN [id number]   Admission Date 4/24/2025 Operation Date 4/24/2025   Attending Physician Latia Goodman MD Operating Physician Latia Goodman MD     PREOPERATIVE DIAGNOSIS   Biliary colic    POSTOPERATIVE DIAGNOSIS  Same    PROCEDURE PERFORMED  Laparoscopic cholecystectomy with ICG guidance  Transversus abdominis plane block    SURGEON  Latia Goodman MD    ASSISTANTS  LOBO Augustin her assistance was necessary for the conduct of this case especially in the careful dissection around the hilum and gallbladder removal    ANESTHESIA  General    INDICATIONS   This is a 74-year-old woman who presented to the outpatient setting with right upper quadrant abdominal pain.  Patient had imaging which demonstrated cholelithiasis.  Patient continued to have symptoms.  Cholecystectomy was indicated.  The procedure and all the risks were discussed with the patient and she consented.    FINDINGS  Soft and uninflamed gallbladder with omental adhesions, successful identification of the cystic then common bile duct via ICG    FINDINGS/DESCRIPTION OF PROCEDURE  After informed consent, patient was brought to the operating room and placed in supine position with arms out. Bilateral SCDs were placed and pre-operative antibiotics administered. Anesthesia was induced without any complication. Patient was prepped and draped in the usual sterile fashion. Time out was performed confirming patient, procedure, and site.    The Veress needle was used to gain pneumoperitoneum. Using blade, a curvilinear supraumbilical incision was made. Veress needle was inserted just under the umbilical stalk with audible clicks. On aspiration, there was no bowel contents.  Saline flowed easily confirming presence in the abdomen.Pneumoperitoneum was created with CO2. An 11mm port was inserted. Upon entrance, no injury was noted to omentum or bowel at site of entrance.  Three 5mm ports were  placed under direct visualization; two on the right lateral aspect of the abdomen and the third subxiphoid, triangulating toward the location of the gallbladder. Transversus abdominis plane block was performed.  Upon inspection of the abdomen, gallbladder was soft and uninflamed.  Gallbladder was grasped and retracted above the liver. Dissection was started at the infundibulum. Cystic duct and artery were identified, clearly seeing the critical view of safety.  Patient was administered indocyanine green dye preoperatively.  Indocyanine green dye was noted in the gallbladder, cystic duct, and into the common bile duct.  Common bile duct did not look distended nor did the cystic duct. The cystic duct and artery were then clipped and divided. The gallbladder was then resected from the fossa and placed in endocatch bag.  There was some spillage of bile and resecting the gallbladder.  The gallbladder fossa was irrigated until clear. Hemostasis achieved. Gallbladder was extracted from abdominal cavity and sent for pathology.  Using Byron Calderon, fascia at umbilicus was closed with o vicryl. Pneumoperitoneum was evacuated. Skin incisions were closed with 4-o monocryl. Incisions were washed and dressed with dermabond.     At the end of the case, all counts were correct. Patient tolerated procedure well. Patient was awakened and transferred to PACU in a hemodynamically stable condition.     SPECIMENS REMOVED  Gallbladder    ESTIMATED BLOOD LOSS  5 ml    COMPLICATIONS  None    Latia Goodman MD

## 2025-04-24 NOTE — ANESTHESIA PROCEDURE NOTES
Airway  Date/Time: 4/24/2025 11:05 AM  Reason: elective    Airway not difficult    General Information and Staff   Patient location during procedure: OR  Anesthesiologist: Anastacio Barragan MD  Performed: anesthesiologist   Performed by: Anastacio Barragan MD  Authorized by: Anastacio Barragan MD        Indications and Patient Condition  Indications for airway management: anesthesia  Sedation level: deep      Preoxygenated: yesPatient position: sniffing    Mask difficulty assessment: 1 - vent by mask    Final Airway Details    Final airway type: endotracheal airway    Successful airway: ETT  Cuffed: yes   Successful intubation technique: Video laryngoscopy  Facilitating devices/methods: intubating stylet  Endotracheal tube insertion site: oral  Blade: GlideScope  Blade size: #3  ETT size (mm): 7.0    Cormack-Lehane Classification: grade I - full view of glottis  Placement verified by: capnometry   Measured from: lips  ETT to lips (cm): 21  Number of attempts at approach: 1  Number of other approaches attempted: 0    Additional Comments  Elective use of Glidescope to teach paramedic student

## 2025-04-24 NOTE — H&P
[facility]  Report of Surgical History and Physical Exam    Sophia Rendon Patient Status:  Hospital Outpatient Surgery    1950 MRN [id number]   Location [facility] PRE OP HOLDING Attending Latia Goodman MD   Hosp Day # 0 PCP Mallory Johnson DO     Chief Complaint: Biliary colic    History of Present Illness:  Sophia Rendon is a a(n) 74 year old female who presents for elective cholecystectomy.  No new issues since last visit.      History:  Past Medical History[1]    Past Surgical History[2]    Family History[3]     reports that she has been smoking cigarettes. She has a 21 pack-year smoking history. She has never used smokeless tobacco. She reports that she does not currently use alcohol. She reports that she does not use drugs.      Allergies:  Allergies[4]      Medications:  Current Hospital Medications[5]      Review of Systems:  The review of systems was negative other than the above listed HPI and past medical history including the HEENT, Heart, Lungs, GI, , Neuro, Musculoskeletal, Hematologic, Endocrine and Psych.       Physical Exam:  Blood pressure 129/72, pulse 67, temperature 97.8 °F (36.6 °C), temperature source Oral, resp. rate 16, height 60\", weight 132 lb (59.9 kg), SpO2 95%.  General: Alert, orientated x3.  Cooperative.  No apparent distress.  HEENT: Exam is unremarkable.  Without scleral icterus.  Mucous membranes are moist. EOM are WNL.  Neck: No tenderness to palpitation.  Full range of motion to flexion and extension, lateral rotation and lateral flexion of cervical spine.  No JVD. Supple.   Lungs: Bilateral chest rise. Good excursion of the diaphragms. No secondary use of accessory respiratory musculature.  Cardiac: Regular rate and rhythm. No murmur.  Abdomen: Soft, nontender, nondistended  Extremities:  No lower extremity edema noted.  Without clubbing or cyanosis.  2+ pulses x4  Skin: Normal texture and turgor.      Laboratory Data and Relevant Imaging:  Recent Labs    Lab 04/17/25  1444   RBC 4.95   HGB 15.7   HCT 46.9   MCV 94.7   MCH 31.7   MCHC 33.5   RDW 14.2   WBC 6.2   .0       No results for input(s): \"GLU\", \"BUN\", \"CREATSERUM\", \"GFRAA\", \"GFRNAA\", \"CA\", \"ALB\", \"NA\", \"K\", \"CL\", \"CO2\", \"ALKPHO\", \"AST\", \"ALT\", \"BILT\", \"TP\" in the last 168 hours.      No results for input(s): \"PTP\", \"INR\", \"PTT\" in the last 168 hours.    Imaging  None      Impression/Plan:  Problem List[6]    74 year old female with biliary colic    Will proceed as scheduled  All questions answered    Thank you for letting me participate in the care of this patient    Latia Goodman MD  4/24/2025  10:32 AM         [1]   Past Medical History:   Allergic rhinitis    Anxiety    Anxiety state    Chronic PTSD    Breast cancer (HCC)    Cancer (HCC)    Cataract    Colon cancer screening    COPD (chronic obstructive pulmonary disease)    Depression    Disorder of thyroid    Diverticulitis    Fall    Goiter    Graves disease    Heart attack (HCC)    4 stents    Heart disease    History of appendectomy    History of hysterectomy    Hyperthyroidism    Graves Disease    Hypothyroid    Hypothyroidism    Migraines    Ovarian mass    Pneumonia    Pneumonia, organism unspecified(486)    Post PTCA    Post traumatic stress disorder (PTSD)    Pulmonary emphysema (HCC)    Shortness of breath    Tobacco abuse    Unspecified essential hypertension    Visual impairment    glasses   [2]   Past Surgical History:  Procedure Laterality Date    Angioplasty (coronary)      With STENT    Appendectomy      Appendectomy,w othr proc      right OOPHORECTOMY    Breast biopsy  1980 & 1990    Fibroadenomas    Hysterectomy  2012    total hystero.    Lumpectomy left  1980    Fibroadenoma    Lumpectomy right  08/2024    with 5 lymph nodes removed    Susie localization wire 1 site left (cpt=19281) Left 1980    benign.    Susie localization wire 1 site right (cpt=19281) Right 1990    benign.    Oophorectomy Bilateral 2012    total hystero.     Other surgical history      Fibroadenoma    Other surgical history      Ruptured cyst- right ovary    Replacement prosthetic aortic valve w/ cardiopulmonary bypass; w/ stent< tissue valve   & 2-2014    3 in 2010 & 1 in 2014    Sent lymph node biopsy      Tonsillectomy     [3]   Family History  Problem Relation Age of Onset    Psychiatric Mother         Severe Depression, anxiety    Cancer Mother         Heart    Other (Other) Mother         Hypothyroidism    Heart Disease Mother     Endocrine Disorder Father         cushing's disease    Other (Other) Father     Other (CHEK-2+) Sister     Breast Cancer Sister 65        L breast- invasive ductal & lobular cancer    Other (Other) Other         stroke, a-fib    Migraines Other     Breast Cancer Sister         Double mastectomy    [4]   Allergies  Allergen Reactions    Codeine OTHER (SEE COMMENTS)     Migraines     Epinephrine OTHER (SEE COMMENTS)     Injection    Penicillin G CARDIAC ARREST    Sulfa Antibiotics CARDIAC ARREST    Eggs Or Egg-Derived Products OTHER (SEE COMMENTS)     Makes her mucusy     Berries UNKNOWN    Chicken Allergy      And ALL FOWL type bird    Chocolate     Clindamycin OTHER (SEE COMMENTS)     .    Dairy Products     Grass      trees    Molds & Smuts     Mushrooms     Soy [Isoflavones, Soy]     Biaxin [Clarithromycin] DIARRHEA    Daily Multi-Vitamins-Iron DIARRHEA   [5]   Current Facility-Administered Medications:     [Transfer Hold] acetaminophen (Tylenol Extra Strength) tab 1,000 mg, 1,000 mg, Oral, Once    lactated ringers infusion, , Intravenous, Continuous    ceFAZolin (Ancef) 2g in 10mL IV syringe premix, 2 g, Intravenous, Once    ceFAZolin (Ancef) 2 g/10mL IV syringe premix, , ,   [6]   Patient Active Problem List  Diagnosis    Vitamin D deficiency    Tobacco abuse    Goiter    Platelet inhibition due to Plavix    Hypothyroidism    Anxiety    Stable angina    Chronic obstructive pulmonary disease (HCC)    Moderate recurrent  major depression (HCC)    Coronary artery disease involving native heart    Thoracic aorta atherosclerosis    Centrilobular emphysema (HCC)    Migraine with aura and without status migrainosus, not intractable    Diverticulosis of intestine without bleeding    Symptomatic cholelithiasis    Invasive lobular carcinoma of breast in female (HCC)    Elevated hemoglobin    Regional lymph node metastasis present (HCC)    Bone disorder

## 2025-04-24 NOTE — ANESTHESIA PREPROCEDURE EVALUATION
PRE-OP EVALUATION    Patient Name: Sophia Rendon    Admit Diagnosis: Biliary colic [K80.50]    Pre-op Diagnosis: Biliary colic [K80.50]    LAPAROSCOPIC CHOLECYSTECTOMY, WITH INJECTION OF INDOCYANINE GREEN (ICG)    Anesthesia Procedure: LAPAROSCOPIC CHOLECYSTECTOMY, WITH INJECTION OF INDOCYANINE GREEN (ICG) (Abdomen)    Surgeons and Role:     * Latia Goodman MD - Primary    Pre-op vitals reviewed.  Temp: 97.8 °F (36.6 °C)  Pulse: 67  Resp: 16  BP: 129/72  SpO2: 95 %  Body mass index is 25.78 kg/m².    Current medications reviewed.  Hospital Medications:  Current Medications[1]    Outpatient Medications:   Prescriptions Prior to Admission[2]    Allergies: Codeine; Epinephrine; Penicillin g; Sulfa antibiotics; Eggs or egg-derived products; Berries; Chicken allergy; Chocolate; Clindamycin; Dairy products; Grass; Molds & smuts; Mushrooms; Soy [isoflavones, soy]; Biaxin [clarithromycin]; and Daily multi-vitamins-iron      Anesthesia Evaluation    Patient summary reviewed.    Anesthetic Complications  (-) history of anesthetic complications         GI/Hepatic/Renal    Negative GI/hepatic/renal ROS.                             Cardiovascular        Exercise tolerance: good     MET: >4      (+) hypertension   (+) hyperlipidemia  (+) CAD                                Endo/Other           (+) hypothyroidism                       Pulmonary        (+) COPD                   Neuro/Psych      (+) depression  (+) anxiety                              Past Surgical History[3]  Social Hx on file[4]  History   Drug Use Unknown     Available pre-op labs reviewed.  Lab Results   Component Value Date    WBC 6.2 04/17/2025    RBC 4.95 04/17/2025    HGB 15.7 04/17/2025    HCT 46.9 04/17/2025    MCV 94.7 04/17/2025    MCH 31.7 04/17/2025    MCHC 33.5 04/17/2025    RDW 14.2 04/17/2025    .0 04/17/2025     Lab Results   Component Value Date     02/20/2025    K 4.3 02/20/2025     02/20/2025    CO2 31.0 02/20/2025     BUN 10 02/20/2025    CREATSERUM 0.83 02/20/2025    GLU 99 02/20/2025    CA 10.2 02/20/2025            Airway      Mallampati: II  Mouth opening: >3 FB  TM distance: > 6 cm  Neck ROM: full Cardiovascular    Cardiovascular exam normal.  Rhythm: regular  Rate: normal     Dental    Dentition appears grossly intact         Pulmonary    Pulmonary exam normal.  Breath sounds clear to auscultation bilaterally.               Other findings              ASA: 2   Plan: general  NPO status verified and patient meets guidelines.  Patient has not taken beta blockers in last 24 hours.  Post-procedure pain management plan discussed with surgeon and patient.    Comment: I spoke with the patient and discussed the risks of general anesthesia, which include nausea and vomiting; sore throat; injury to the lips, gums, teeth, and eyes; cardiac, pulmonary, and neurologic events; aspiration; and allergic reactions. The patient understands these risks and consents to receiving general anesthesia for this procedure.  Plan/risks discussed with: patient and child/children                Present on Admission:  **None**             [1]    [Transfer Hold] acetaminophen (Tylenol Extra Strength) tab 1,000 mg  1,000 mg Oral Once    lactated ringers infusion   Intravenous Continuous    [COMPLETED] indocyanine green (IC-Green) injection 5 mg  5 mg Intravenous Once    [COMPLETED] ceFAZolin (Ancef) 2g in 10mL IV syringe premix  2 g Intravenous Once    ceFAZolin (Ancef) 2 g/10mL IV syringe premix        bupivacaine PF (Marcaine) 0.25% injection    PRN   [2]   Medications Prior to Admission   Medication Sig Dispense Refill Last Dose/Taking    acetaminophen 500 MG Oral Tab Take 2 tablets (1,000 mg total) by mouth every 6 (six) hours as needed for Pain.   4/24/2025 at  7:00 AM    LETROZOLE 2.5 MG Oral Tab TAKE ONE TABLET BY MOUTH DAILY 30 tablet 6 4/24/2025 at  6:45 AM    fluticasone-umeclidin-vilant (TRELEGY ELLIPTA) 200-62.5-25 MCG/ACT Inhalation Aerosol  Powder, Breath Activated Inhale 1 puff into the lungs daily. 3 each 0 4/23/2025 at 11:00 PM    Abemaciclib 150 MG Oral Tab Take 1 tablet (150 mg total) by mouth 2 (two) times daily. may take with or without food (Patient taking differently: Take 1 tablet (150 mg total) by mouth 2 (two) times daily. may take with or without food.  hasn't started this med yet. Will start after cholecystectomy on 4-24-25 upon doctor's orders) 60 tablet 11 Taking Differently    ergocalciferol 1.25 MG (81807 UT) Oral Cap Take 1 capsule (50,000 Units total) by mouth once a week. 12 capsule 1 4/1/2025    SYNTHROID 88 MCG Oral Tab TAKE ONE TABLET BY MOUTH DAILY BEFORE breakfast 90 tablet 3 4/24/2025 at  6:45 AM    Butalbital-APAP-Caffeine -40 MG Oral Tab Take 1 tablet by mouth every 6 (six) hours as needed for Pain. Take one to 2 tablets as needed 30 tablet 0 Past Month    Vortioxetine HBr (TRINTELLIX OR) Take 2.5 mg by mouth every other day. 1.5mg every 4 days and 2.5mg every 4 days   4/23/2025 at  9:00 PM    aspirin EC 81 MG Oral Tab EC Take 1 tablet (81 mg total) by mouth daily. 30 tablet 11 4/17/2025    Metoprolol Succinate ER (TOPROL XL) 25 MG Oral Take 0.5 tablets (12.5 mg total) by mouth in the morning. Takes 1/2 tablet (12.5 mg) daily.   4/24/2025 at  6:45 AM    simvastatin (ZOCOR) 10 MG Oral Tab Take 1 tablet (10 mg total) by mouth nightly.   4/23/2025 at  9:00 PM    alprazolam (XANAX) 0.5 MG Oral Tab Take 1 tablet (0.5 mg total) by mouth. Take one tablet night PRN up to 4 tablets daily   4/24/2025 at  9:00 AM    ondansetron 8 MG Oral Tablet Dispersible Take 1 tablet (8 mg total) by mouth every 8 (eight) hours as needed for Nausea. 30 tablet 0     Multiple Vitamins-Minerals (PRESERVISION AREDS 2 OR) Take by mouth.   More than a month    Multiple Vitamins-Minerals (MULTI-VITAMIN/MINERALS) Oral Tab Take 1 tablet by mouth in the morning.   More than a month    Budesonide-Formoterol Fumarate (SYMBICORT) 160-4.5 MCG/ACT Inhalation  Aerosol Inhale 2 puffs into the lungs 2 (two) times daily. 3 each 1 More than a month    nitroGLYCERIN (NITROSTAT) 0.4 MG Sublingual SL Tab Place 1 tablet (0.4 mg total) under the tongue every 5 (five) minutes as needed. 30 tablet 0 Unknown   [3]   Past Surgical History:  Procedure Laterality Date    Angioplasty (coronary)      With STENT    Appendectomy      Appendectomy,w othr proc      right OOPHORECTOMY    Breast biopsy  1980 & 1990    Fibroadenomas    Hysterectomy  2012    total hystero.    Lumpectomy left  1980    Fibroadenoma    Lumpectomy right  08/2024    with 5 lymph nodes removed    Susie localization wire 1 site left (cpt=19281) Left 1980    benign.    Susie localization wire 1 site right (cpt=19281) Right 1990    benign.    Oophorectomy Bilateral 2012    total hystero.    Other surgical history      Fibroadenoma    Other surgical history      Ruptured cyst- right ovary    Replacement prosthetic aortic valve w/ cardiopulmonary bypass; w/ stent< tissue valve   & 2-2014    3 in 2010 & 1 in 2014    Sent lymph node biopsy      Tonsillectomy     [4]   Social History  Socioeconomic History    Marital status:     Number of children: 2   Occupational History    Occupation:     Occupation: RETIRED   Tobacco Use    Smoking status: Every Day     Current packs/day: 1.00     Average packs/day: 1 pack/day for 21.0 years (21.0 ttl pk-yrs)     Types: Cigarettes    Smokeless tobacco: Never    Tobacco comments:     quit once & want to quit again   Vaping Use    Vaping status: Never Used   Substance and Sexual Activity    Alcohol use: Not Currently     Comment: minimal    Drug use: Never   Other Topics Concern    Caffeine Concern No    Stress Concern Yes    Weight Concern No    Special Diet Yes     Comment: Try to eat a heart healthy diet    Exercise Yes     Comment: Plan on doing more    Seat Belt Yes     Comment: Always

## 2025-04-25 ENCOUNTER — TELEPHONE (OUTPATIENT)
Facility: LOCATION | Age: 75
End: 2025-04-25

## 2025-04-25 NOTE — TELEPHONE ENCOUNTER
Patient has questions about when to take off abdominal wrap/band.     Please advise  Best callback number is [phone]    Future Appointments   Date Time Provider Department Center   5/8/2025  1:45 PM [id number]   6/3/2025  2:30 PM Heike John MD NP [facility]   10/29/2025  3:30 PM Mallory Johnson,  [facility] [address] [facility] [address]

## 2025-04-25 NOTE — TELEPHONE ENCOUNTER
4/24/25 LAPAROSCOPIC CHOLECYSTECTOMY, WITH INJECTION OF INDOCYANINE GREEN (ICG) w/KESHA    Spoke with patient, and recommended that she wear abdominal binder for 72 hrs post-surgery, removing it every 12 hrs to inspect skin integrity.  She verbalized understanding, is agreeable, and had no further questions or concerns.

## 2025-04-29 NOTE — DISCHARGE INSTRUCTIONS
Cholecystectomy  Dr. Latia Goodman    MEDICATIONS  For post-operative pain control, the medications are usually oxycodone.  This is a narcotic and is best taken by starting with a half a tablet every four to six hours as needed.  If the patient does not feel they need the narcotics they shouldn’t take them.  If the pain is severe the patient may take a whole pill every six hours.  The patient can take tylenol 1g three times a day and ibuprofen 400-600mg in between up to 4 times a day as needed for pain as well.  The patient can take zofran 4mg every 6 hours as needed for nausea. The patient can also take miralax or colace as needed for constipation. Please ask your surgeon before resuming blood thinners such as aspirin, Plavix or Coumadin.  All other home medications may be resumed as scheduled. With severe cholecystitis, antibiotics will also be prescribed.  With antibiotics, please watch for rash, facial swelling or severe diarrhea.    DIET  The patient may resume a general diet immediately as tolerated.  The patient should eat in moderation and stick with foods the patient feels are easy to digest.  The patient may eat anything in small amounts but foods rich in dairy products, fatty foods or fried foods may cause an upset stomach for up to six weeks after surgery.  There should be no alcohol consumption in the immediate recovery time period or within six hours of taking narcotics.    WOUND CARE  The dressing is usually a dissolvable glue which protects the wound. The patient can take a shower starting the day after surgery, but please, no baths or soaking. Please do not put any creams or ointments on the surgical incisions. If the patient does have a top dressing with clear tape and gauze, this dressing may be removed in 2 days. Do not remove the steri-strips or butterfly tapes that are white and adherent to the skin.  The steri-strips will eventually peel up at the ends and at this point they may be removed.   This is usually seven to ten days after surgery.  When showering, soap can get on the wounds but do not scrub over the wounds.  No hair dye or chemicals of any kind should get in the wounds.  Avoid tub baths, swimming or sitting in a hot tub for two weeks.  If visible sutures or staples are present they will be removed in the office by the surgeon or nurse.  Most wounds will be closed with dissolving suture underneath the skin.  These sutures will dissolve on their own.  If a drain is present make sure the patient receives drain care instructions from the nurse prior to discharge.  Most patients will not have a drain.    ACTIVITY  Every day the patient should be up, showered and dressed.  Each day the patient should be up and around the house.  The patient should not lie in bed and should not stay in pajamas.  We count on the patient being up, coughing, walking and deep breathing to avoid pneumonia and blood clots in the legs.  Once a day the patient should get out of the house and go shopping, go to the mall, the [employer] store, the [employer] or a restaurant.  The patient may ride in a car but should not drive the car for at least one week.  Patients should be off narcotics for at least 8 hours prior to being a .  The average time off work is 10 to 14 days; most adults will be seeing the surgeon prior to returning to work.  Students will return to school within 1-5 days after discharge from the hospital but will be off gym, sports, and indoors for recess for one month.  Patients may go up and down stairs and lift up to five pounds but no bending, pushing or pulling.  Nothing called work or exercise until the follow up visit.  No ‘stair-master’, power walking, jogging or workouts until the follow up visit.  Patients should seek further activity limits at the time of their appointment.    APPOINTMENT  Please call our office for an appointment within 2 weeks of discharge. Any fever greater than 100.5, chills,  nausea, vomiting, or severe diarrhea please call our office.  If the wound turns red, hot, swollen, becomes increasingly painful, or drains pus call us immediately at [phone].  For life threatening emergencies call 911.  For non-emergent care please call our office after 8:30 a.m. Monday through Friday.  The number listed above is our office number; our phone automatically switches to our answering service if we are not there.  Please do not use the emergency room for non-urgent care.    Thank you for entrusting us with your care.  [facility]--General Surgery    You received a drug called Toradol which is an Anti Inflammatory at: 11:55am.  If you are allowed to take Anti inflammatories:    Do not take any Anti Inflammatory like Motrin, Aleve or Ibuprophen until after: 5:55pm.  Please report any suspected allergic reactions or bleeding issues to your doctor    Render In Strict Bullet Format?: No Plan: Recheck in 2 weeks Initiate Treatment: Mupirocin to open wounds Detail Level: Zone

## 2025-05-08 ENCOUNTER — OFFICE VISIT (OUTPATIENT)
Facility: LOCATION | Age: 75
End: 2025-05-08
Payer: MEDICARE

## 2025-05-08 VITALS
SYSTOLIC BLOOD PRESSURE: 134 MMHG | DIASTOLIC BLOOD PRESSURE: 77 MMHG | OXYGEN SATURATION: 99 % | HEART RATE: 80 BPM | TEMPERATURE: 98 F

## 2025-05-08 DIAGNOSIS — Z09 POSTOP CHECK: Primary | ICD-10-CM

## 2025-05-08 NOTE — PROGRESS NOTES
Post Operative Visit Note       Active Problems  1. Postop check         Chief Complaint   Chief Complaint   Patient presents with    Post-Op     PO - 4/24 W/LEH, LAPAROSCOPIC CHOLECYSTECTOMY, WITH INJECTION OF INDOCYANINE GREEN (ICG), pt reports she is very constipated, no other symptoms.          History of Present Illness   The patient presents today for postoperative visit following laparoscopic cholecystectomy on 4/24/2025 with Dr. Goodman.  Patient states that she has overall been recovering well and without acute complaints today.  Her preoperative symptoms have resolved.  She is tolerating a diet without nausea or vomiting.  She does endorse constipation since surgery, though has relief when she takes a stool softener.  She denies any fevers or chills.    Allergies  Sophia is allergic to codeine; epinephrine; penicillin g; sulfa antibiotics; eggs or egg-derived products; berries; chicken allergy; chocolate; clindamycin; dairy products; grass; molds & smuts; mushrooms; soy [isoflavones, soy]; biaxin [clarithromycin]; and daily multi-vitamins-iron.    Past Medical / Surgical / Social / Family History    The past medical and past surgical history have been reviewed by me today.     Past Medical History:    Allergic rhinitis    Anxiety    Anxiety state    Chronic PTSD    Atherosclerosis of coronary artery    Breast cancer (HCC)    Cancer (HCC)    Cataract    Colon cancer screening    COPD (chronic obstructive pulmonary disease)    Depression    Disorder of thyroid    Diverticulitis    Exposure to medical diagnostic radiation    Fall    Goiter    Graves disease    Heart attack (HCC)    4 stents    Heart disease    History of appendectomy    History of hysterectomy    Hyperthyroidism    Graves Disease    Hypothyroid    Hypothyroidism    Migraines    Ovarian mass    Pneumonia    Pneumonia, organism unspecified(486)    Post PTCA    Post traumatic stress disorder (PTSD)    Pulmonary emphysema (HCC)    Shortness of  breath    Tobacco abuse    Unspecified essential hypertension    Visual impairment    glasses     Past Surgical History:   Procedure Laterality Date    Adenoidectomy  1954    Angioplasty (coronary)      With STENT    Appendectomy      Appendectomy,w othr proc      right OOPHORECTOMY    Breast biopsy  1980 & 1990    Fibroadenomas    Cholecystectomy  4-24-25    Hysterectomy  2012    total hystero.    Lumpectomy left  1980    Fibroadenoma    Lumpectomy right  08/2024    with 5 lymph nodes removed    Susie localization wire 1 site left (cpt=19281) Left 1980    benign.    Susie localization wire 1 site right (cpt=19281) Right 1990    benign.    Needle biopsy right  7-27-24    Ultrasound biopsy    Oophorectomy Bilateral 2012    total hystero.    Other surgical history      Fibroadenoma    Other surgical history      Ruptured cyst- right ovary    Replacement prosthetic aortic valve w/ cardiopulmonary bypass; w/ stent< tissue valve   & 2-2014    3 in 2010 & 1 in 2014    Sent lymph node biopsy      Tonsillectomy         The family history and social history have been reviewed by me today.    Family History   Problem Relation Age of Onset    Psychiatric Mother         Severe Depression, anxiety    Cancer Mother         Heart    Other (Other) Mother         Hypothyroidism    Heart Disease Mother     Depression Mother     Endocrine Disorder Father         cushing's disease    Other (Other) Father     Other (CHEK-2+) Sister     Breast Cancer Sister 65        L breast- invasive ductal & lobular cancer    Other (Other) Other         stroke, a-fib    Migraines Other     Breast Cancer Sister         Double mastectomy     Cancer Sister         L Breast - Lobular & Ductal - 5-23. Dbl. mastectomy 12-23    Stroke Maternal Grandfather      Social History     Socioeconomic History    Marital status:     Number of children: 2   Occupational History    Occupation:     Occupation: RETIRED   Tobacco Use     Smoking status: Every Day     Current packs/day: 1.00     Average packs/day: 1 pack/day for 21.0 years (21.0 ttl pk-yrs)     Types: Cigarettes    Smokeless tobacco: Never    Tobacco comments:     quit once & want to quit again   Vaping Use    Vaping status: Never Used   Substance and Sexual Activity    Alcohol use: Not Currently     Comment: minimal    Drug use: Never   Other Topics Concern    Caffeine Concern No    Stress Concern Yes    Weight Concern No    Special Diet Yes     Comment: Heart healthy    Exercise Yes     Comment: Hope to do more    Seat Belt Yes     Comment: Always        Current Outpatient Medications:     LETROZOLE 2.5 MG Oral Tab, TAKE ONE TABLET BY MOUTH DAILY, Disp: 30 tablet, Rfl: 6    fluticasone-umeclidin-vilant (TRELEGY ELLIPTA) 200-62.5-25 MCG/ACT Inhalation Aerosol Powder, Breath Activated, Inhale 1 puff into the lungs daily., Disp: 3 each, Rfl: 0    Abemaciclib 150 MG Oral Tab, Take 1 tablet (150 mg total) by mouth 2 (two) times daily. may take with or without food (Patient taking differently: Take 1 tablet (150 mg total) by mouth in the morning and 1 tablet (150 mg total) before bedtime. may take with or without food. hasn't started this med yet. Will start after cholecystectomy on 4-24-25 upon doctor's orders .), Disp: 60 tablet, Rfl: 11    ergocalciferol 1.25 MG (50535 UT) Oral Cap, Take 1 capsule (50,000 Units total) by mouth once a week., Disp: 12 capsule, Rfl: 1    SYNTHROID 88 MCG Oral Tab, TAKE ONE TABLET BY MOUTH DAILY BEFORE breakfast, Disp: 90 tablet, Rfl: 3    Multiple Vitamins-Minerals (PRESERVISION AREDS 2 OR), Take by mouth., Disp: , Rfl:     Multiple Vitamins-Minerals (MULTI-VITAMIN/MINERALS) Oral Tab, Take 1 tablet by mouth in the morning., Disp: , Rfl:     Budesonide-Formoterol Fumarate (SYMBICORT) 160-4.5 MCG/ACT Inhalation Aerosol, Inhale 2 puffs into the lungs 2 (two) times daily., Disp: 3 each, Rfl: 1    Butalbital-APAP-Caffeine -40 MG Oral Tab, Take 1  tablet by mouth every 6 (six) hours as needed for Pain. Take one to 2 tablets as needed, Disp: 30 tablet, Rfl: 0    Vortioxetine HBr (TRINTELLIX OR), Take 2.5 mg by mouth every other day. 1.5mg every 4 days and 2.5mg every 4 days, Disp: , Rfl:     nitroGLYCERIN (NITROSTAT) 0.4 MG Sublingual SL Tab, Place 1 tablet (0.4 mg total) under the tongue every 5 (five) minutes as needed., Disp: 30 tablet, Rfl: 0    aspirin EC 81 MG Oral Tab EC, Take 1 tablet (81 mg total) by mouth daily., Disp: 30 tablet, Rfl: 11    Metoprolol Succinate ER (TOPROL XL) 25 MG Oral, Take 0.5 tablets (12.5 mg total) by mouth in the morning. Takes 1/2 tablet (12.5 mg) daily., Disp: , Rfl:     simvastatin (ZOCOR) 10 MG Oral Tab, Take 1 tablet (10 mg total) by mouth nightly., Disp: , Rfl:     alprazolam (XANAX) 0.5 MG Oral Tab, Take 1 tablet (0.5 mg total) by mouth. Take one tablet night PRN up to 4 tablets daily, Disp: , Rfl:     oxyCODONE 5 MG Oral Tab, Take 0.5 tablets (2.5 mg total) by mouth every 6 (six) hours as needed for Pain., Disp: 10 tablet, Rfl: 0    ondansetron 4 MG Oral Tablet Dispersible, Take 1 tablet (4 mg total) by mouth every 8 (eight) hours as needed for Nausea. (Patient not taking: Reported on 5/8/2025), Disp: 10 tablet, Rfl: 0    acetaminophen 500 MG Oral Tab, Take 2 tablets (1,000 mg total) by mouth every 6 (six) hours as needed for Pain., Disp: , Rfl:       Review of Systems  A 10 point Review of Systems has been completed by me today and is negative except as above in the HPI.    Physical Findings   /77 (BP Location: Left arm, Patient Position: Sitting, Cuff Size: adult)   Pulse 80   Temp 98.1 °F (36.7 °C) (Temporal)   SpO2 99%   Gen/psych: alert and oriented, cooperative, no apparent distress  Cardiovascular: regular rate  Respiratory: respirations unlabored, no wheeze  Abdominal: soft, non-tender, non-distended, no guarding/rebound  Incisions: clean, dry, intact, no erythema, no drainage           Assessment/Plan  1. Postop check      The patient presents today for postoperative visit following laparoscopic cholecystectomy on 4/24/2025 with Dr. Goodman.      The patient is doing well postoperatively   Incisions are healing well without any signs of infection  The patient is to continue with diet as tolerated.    Recommended MiraLAX as needed for constipation.  Also recommend patient increase fiber and water intake.  The patient is to continue with lifting restrictions of no more than 15 pounds for 6 weeks from the date of the surgery.  Surgical pathology was discussed with the patient and consistent with chronic cholecystitis and cholelithiasis.  All questions and concerns were answered.  The patient is to return to the clinic as needed.               No orders of the defined types were placed in this encounter.       Imaging & Referrals   None    Follow Up  Return if symptoms worsen or fail to improve.    Maryan Choudhary PA-C  [facility] General Surgery  5/8/2025  2:18 PM

## 2025-05-15 ENCOUNTER — OFFICE VISIT (OUTPATIENT)
Dept: PHYSICAL THERAPY | Facility: HOSPITAL | Age: 75
End: 2025-05-15
Attending: INTERNAL MEDICINE
Payer: MEDICARE

## 2025-05-15 PROCEDURE — 97140 MANUAL THERAPY 1/> REGIONS: CPT | Performed by: PHYSICAL THERAPIST

## 2025-05-15 NOTE — PROGRESS NOTES
Patient: Sophia Rendon (74 year old, female) Referring Provider:  Insurance:   Diagnosis: Invasive lobular carcinoma of breast in female (HCC) (C50.919)  , I89.0 lymphedema [facility]   Date of Surgery: 8/16/2024  N/A   Precautions:  Drug Allergy; Other (use comment) (anxiety)  Referral Information:    Date of Evaluation: Req: 8, Auth: 8, Exp: 6/12/2025 03/12/25 POC Auth Visits:          Today's Date   5/15/2025    Subjective  Pt missing therapy due to having gallbladder surgery       Pain: 1/10     Objective  Swelling R breast and trunk   Trunk Measurements       3/12/2025 4/10/2025 5/15/2025   Trunk Measurements   Trunk Measurement 1 11 cm inf from sternal notch: 93 cm 11 cm inf from sternal notch: 92.8 (IE 93 cm) 11 cm inf from sternal notch: 92.6 (IE 93 cm)   Trunk Measurement 2 21 cm in from sternal notch: 95.8 cm 21 cm in from sternal notch: 95.5 CM (IE 95.8 cm) 21 cm in from sternal notch: 96 CM (IE 95.8 cm)       Assessment  slight increase in swelling, but pt had recent abd surgery    Goals (to be met in 10 visits)      LYMPHEDEMA GOALS Not Met Progressing Toward Partially Met Met   Decrease swelling R breast by a total of 2 cm to decrease risk of infection       Pt to be independent with self MLD, ROM exercises, and donning/doffing compression bandages/garments to facilitate swelling reduction and to be independent at self-management once discharged.                      Plan  cont w/ CDT    Treatment Last 4 Visits  Treatment Day: 0.3       3/12/2025 4/10/2025 5/15/2025   PT Treatment   Insurance Auth # and Certification Dates Certification From: 3/12/2025  To:6/10/2025 Certification From: 3/12/2025  To:6/10/2025 Certification From: 3/12/2025  To:6/10/2025   # of Visits Used/Approved 1/10 2/10 3/10   Manual Therapy MLD: Neck sequence, abd sequence, left axillary, A-A, R inguinal, R A-I, Right axillary, Right breast, RUE.    Soft debridement of hyperkeratosis R  breast    Compression:  - pt has OTS sleeve and gauntlet to use as needed  - pt wearing sports bra which is too tight around trunk. Sample light compression bra given to pt (better fit), fabricated foam for inferior breast for compression and elevation MLD: Neck sequence, abd sequence, left axillary, A-A, R inguinal, R A-I, Right axillary, Right breast    Compression:  - pt wearing bra w/ compression pads   MLD: neck sequence, abd sequence, R inguinal, R A-I, L axilla, A-A, R axilla, R breast. Deep technique areas of firmness    Compression:  - pt wearing bra w/ compression pads over right breast   Therapeutic Activity Instr in self MLD (verbal, demo, written)      Explained Lymphedema diagnosis; reviewed lymphatic system, informed patient of lymphedema precautions and risk reduction practices, and importance of skin care.    Reviewed self MLD    Therapeutic Exercise Min  10    Manual Therapy Min 40 40 45   Therapeutic Activity Min 20     Evaluation Min 15     Total of Timed Procedures 60 50 45   Total of Service Based 15 0 0   Total Treatment Time 75 50 45        HEP       Charges     mm3

## 2025-05-20 ENCOUNTER — APPOINTMENT (OUTPATIENT)
Dept: PHYSICAL THERAPY | Facility: HOSPITAL | Age: 75
End: 2025-05-20
Attending: INTERNAL MEDICINE
Payer: MEDICARE

## 2025-05-28 ENCOUNTER — APPOINTMENT (OUTPATIENT)
Dept: PHYSICAL THERAPY | Facility: HOSPITAL | Age: 75
End: 2025-05-28
Attending: INTERNAL MEDICINE
Payer: MEDICARE

## 2025-06-02 ENCOUNTER — LAB ENCOUNTER (OUTPATIENT)
Dept: LAB | Age: 75
End: 2025-06-02
Attending: INTERNAL MEDICINE
Payer: MEDICARE

## 2025-06-02 DIAGNOSIS — C50.919 INVASIVE LOBULAR CARCINOMA OF BREAST IN FEMALE (HCC): ICD-10-CM

## 2025-06-02 DIAGNOSIS — M54.2 NECK PAIN: ICD-10-CM

## 2025-06-02 DIAGNOSIS — C77.9 REGIONAL LYMPH NODE METASTASIS PRESENT (HCC): ICD-10-CM

## 2025-06-02 LAB
ALBUMIN SERPL-MCNC: 4.3 G/DL (ref 3.2–4.8)
ALBUMIN/GLOB SERPL: 1.7 {RATIO} (ref 1–2)
ALP LIVER SERPL-CCNC: 80 U/L (ref 55–142)
ALT SERPL-CCNC: 12 U/L (ref 10–49)
ANION GAP SERPL CALC-SCNC: 6 MMOL/L (ref 0–18)
AST SERPL-CCNC: 21 U/L (ref ?–34)
BASOPHILS # BLD AUTO: 0.07 X10(3) UL (ref 0–0.2)
BASOPHILS NFR BLD AUTO: 1.3 %
BILIRUB SERPL-MCNC: 0.6 MG/DL (ref 0.2–1.1)
BUN BLD-MCNC: 11 MG/DL (ref 9–23)
CALCIUM BLD-MCNC: 9.5 MG/DL (ref 8.7–10.6)
CHLORIDE SERPL-SCNC: 103 MMOL/L (ref 98–112)
CO2 SERPL-SCNC: 31 MMOL/L (ref 21–32)
CREAT BLD-MCNC: 0.86 MG/DL (ref 0.55–1.02)
EGFRCR SERPLBLD CKD-EPI 2021: 71 ML/MIN/1.73M2 (ref 60–?)
EOSINOPHIL # BLD AUTO: 0.13 X10(3) UL (ref 0–0.7)
EOSINOPHIL NFR BLD AUTO: 2.4 %
ERYTHROCYTE [DISTWIDTH] IN BLOOD BY AUTOMATED COUNT: 13.8 %
FASTING STATUS PATIENT QL REPORTED: YES
GLOBULIN PLAS-MCNC: 2.6 G/DL (ref 2–3.5)
GLUCOSE BLD-MCNC: 92 MG/DL (ref 70–99)
HCT VFR BLD AUTO: 46.1 % (ref 35–48)
HGB BLD-MCNC: 15.8 G/DL (ref 12–16)
IMM GRANULOCYTES # BLD AUTO: 0.02 X10(3) UL (ref 0–1)
IMM GRANULOCYTES NFR BLD: 0.4 %
LYMPHOCYTES # BLD AUTO: 1.02 X10(3) UL (ref 1–4)
LYMPHOCYTES NFR BLD AUTO: 18.8 %
MCH RBC QN AUTO: 32 PG (ref 26–34)
MCHC RBC AUTO-ENTMCNC: 34.3 G/DL (ref 31–37)
MCV RBC AUTO: 93.5 FL (ref 80–100)
MONOCYTES # BLD AUTO: 0.47 X10(3) UL (ref 0.1–1)
MONOCYTES NFR BLD AUTO: 8.7 %
NEUTROPHILS # BLD AUTO: 3.71 X10 (3) UL (ref 1.5–7.7)
NEUTROPHILS # BLD AUTO: 3.71 X10(3) UL (ref 1.5–7.7)
NEUTROPHILS NFR BLD AUTO: 68.4 %
OSMOLALITY SERPL CALC.SUM OF ELEC: 289 MOSM/KG (ref 275–295)
PLATELET # BLD AUTO: 187 10(3)UL (ref 150–450)
POTASSIUM SERPL-SCNC: 4.3 MMOL/L (ref 3.5–5.1)
PROT SERPL-MCNC: 6.9 G/DL (ref 5.7–8.2)
RBC # BLD AUTO: 4.93 X10(6)UL (ref 3.8–5.3)
SODIUM SERPL-SCNC: 140 MMOL/L (ref 136–145)
WBC # BLD AUTO: 5.4 X10(3) UL (ref 4–11)

## 2025-06-02 PROCEDURE — 85025 COMPLETE CBC W/AUTO DIFF WBC: CPT

## 2025-06-02 PROCEDURE — 80053 COMPREHEN METABOLIC PANEL: CPT

## 2025-06-02 PROCEDURE — 36415 COLL VENOUS BLD VENIPUNCTURE: CPT

## 2025-06-02 NOTE — PROGRESS NOTES
[facility] [place] Progress Note      Patient Name:  Sophia Rendon  YOB: 1950  Medical Record Number:  [id number]    Date of visit:  6/3/2025    CHIEF COMPLAINT: R breast ca    ONCOLOGY HISTORY:    R lumpectomy/SLN biopsy 8/24-3.6 cm grade 2 ILC.  3+ LN.  Oncotype score 8.  Attics-CHEK2 mutation.  RT completed 12/24.  Letrozole 9/24-  Abemaciclib 3/24-  Cholecystectomy 4/25    HPI:     74 year old that I have followed since 9/24 after she was diagnosed with R breast ca following w/u of self-palpated R breast mass.  S/p R lumpectomy/SLN bx 8/24-3.6 cm grade 2 ILC, 3+LN. no distant disease on staging.  CHEK2 mutation.  Oncotype score was 8.  RT completed 12/24.  Letrozole started 9/24.  Abemaciclib prescribed 3/25, has not started yet.  Presents for evaluation.    Few hot flashes, night sweats.  She underwent cholecystectomy for biliary colic 4/25.  Slowly recovering.    SOCIAL HISTORY:    .  2 children are adopted from [place] that are grown up.  Son lives with her, daughter between [place].  She is a smoker.  Suffers from PTSD and agoraphobia.    ROS:     Constitutional: no fever, chills fatigue,night sweats or weight loss  Neurologic: no headache, seizures, diplopia or weakness  Respiratory: no cough, SOB or hemoptysis  Cardiovascular: no chest pain, ankle swelling, [place]  GI: no nausea, vomiting, diarrhea or BRBPR  Musculoskeletal: no body-ache or joint pain  Dermatologic: no alopecia, rash, pruritis  : no hematuria, dysuria or frequency  Psych: no confusion or depression   Heme: no easy bruising or bleeding     ALLERGIES:  Allergies[1]    MEDICATIONS:    Current Outpatient Medications   Medication Sig Dispense Refill    ondansetron (ZOFRAN) 8 MG tablet Take 1 tablet (8 mg total) by mouth every 8 (eight) hours as needed for Nausea. 30 tablet 3    ondansetron 4 MG Oral Tablet Dispersible Take 1 tablet (4 mg total) by mouth every 8 (eight) hours as needed for Nausea. 10 tablet 0     LETROZOLE 2.5 MG Oral Tab TAKE ONE TABLET BY MOUTH DAILY 30 tablet 6    fluticasone-umeclidin-vilant (TRELEGY ELLIPTA) 200-62.5-25 MCG/ACT Inhalation Aerosol Powder, Breath Activated Inhale 1 puff into the lungs daily. 3 each 0    ergocalciferol 1.25 MG (74131 UT) Oral Cap Take 1 capsule (50,000 Units total) by mouth once a week. (Patient taking differently: Take 1 capsule (50,000 Units total) by mouth every 30 (thirty) days.) 12 capsule 1    SYNTHROID 88 MCG Oral Tab TAKE ONE TABLET BY MOUTH DAILY BEFORE breakfast 90 tablet 3    Multiple Vitamins-Minerals (PRESERVISION AREDS 2 OR) Take by mouth daily.      Multiple Vitamins-Minerals (MULTI-VITAMIN/MINERALS) Oral Tab Take 1 tablet by mouth in the morning.      Budesonide-Formoterol Fumarate (SYMBICORT) 160-4.5 MCG/ACT Inhalation Aerosol Inhale 2 puffs into the lungs 2 (two) times daily. 3 each 1    Butalbital-APAP-Caffeine -40 MG Oral Tab Take 1 tablet by mouth every 6 (six) hours as needed for Pain. Take one to 2 tablets as needed 30 tablet 0    Vortioxetine HBr (TRINTELLIX OR) Take 2.5 mg by mouth every other day. 1.5mg every 4 days and 2.5mg every 4 days      nitroGLYCERIN (NITROSTAT) 0.4 MG Sublingual SL Tab Place 1 tablet (0.4 mg total) under the tongue every 5 (five) minutes as needed. 30 tablet 0    aspirin EC 81 MG Oral Tab EC Take 1 tablet (81 mg total) by mouth daily. 30 tablet 11    Metoprolol Succinate ER (TOPROL XL) 25 MG Oral Take 0.5 tablets (12.5 mg total) by mouth in the morning. Takes 1/2 tablet (12.5 mg) daily.      simvastatin (ZOCOR) 10 MG Oral Tab Take 1 tablet (10 mg total) by mouth nightly.      alprazolam (XANAX) 0.5 MG Oral Tab Take 1 tablet (0.5 mg total) by mouth. Take one tablet night PRN up to 4 tablets daily      Abemaciclib 150 MG Oral Tab Take 1 tablet (150 mg total) by mouth 2 (two) times daily. may take with or without food (Patient not taking: Reported on 6/3/2025) 60 tablet 11       VITALS:  Height: 152.4 cm (5') (06/03  )  Weight: 58.5 kg (129 lb) (1437)  BSA (Calculated - sq m): 1.55 sq meters (1437)  Pulse: 76 (1437)  BP: 147/75 (1437)  Temp: 97.9 °F (36.6 °C) (1437)  Do Not Use - Resp Rate: --  SpO2: 93 % (1437)    PS:  ECO    PHYSICAL EXAM:    General: alert and oriented x 3, not in acute distress.  Neck: No adenopathy.  CVS: S1S2, regular  Rhythm, no murmurs.   Lungs: Clear to auscultation and percussion.  Abdomen: Soft, non tender, no hepato-splenomegaly.  Extremities:  No edema.  CNS: no focal deficit  Breasts:    Emotional well being: Patient's emotional well being was assessed.  No issues requiring acute psychosocial intervention were identified.     LABS:     Recent Results (from the past 24 hours)   Comp Metabolic Panel (14)    Collection Time: 25  3:26 PM   Result Value Ref Range    Glucose 92 70 - 99 mg/dL    Sodium 140 136 - 145 mmol/L    Potassium 4.3 3.5 - 5.1 mmol/L    Chloride 103 98 - 112 mmol/L    CO2 31.0 21.0 - 32.0 mmol/L    Anion Gap 6 0 - 18 mmol/L    BUN 11 9 - 23 mg/dL    Creatinine 0.86 0.55 - 1.02 mg/dL    Calcium, Total 9.5 8.7 - 10.6 mg/dL    Calculated Osmolality 289 275 - 295 mOsm/kg    eGFR-Cr 71 >=60 mL/min/1.73m2    AST 21 <34 U/L    ALT 12 10 - 49 U/L    Alkaline Phosphatase 80 55 - 142 U/L    Bilirubin, Total 0.6 0.2 - 1.1 mg/dL    Total Protein 6.9 5.7 - 8.2 g/dL    Albumin 4.3 3.2 - 4.8 g/dL    Globulin  2.6 2.0 - 3.5 g/dL    A/G Ratio 1.7 1.0 - 2.0    Patient Fasting for CMP? Yes    CBC With Differential With Platelet    Collection Time: 25  3:26 PM   Result Value Ref Range    WBC 5.4 4.0 - 11.0 x10(3) uL    RBC 4.93 3.80 - 5.30 x10(6)uL    HGB 15.8 12.0 - 16.0 g/dL    HCT 46.1 35.0 - 48.0 %    .0 150.0 - 450.0 10(3)uL    MCV 93.5 80.0 - 100.0 fL    MCH 32.0 26.0 - 34.0 pg    MCHC 34.3 31.0 - 37.0 g/dL    RDW 13.8 %    Neutrophil Absolute Prelim 3.71 1.50 - 7.70 x10 (3) uL    Neutrophil Absolute 3.71 1.50 - 7.70 x10(3) uL     Lymphocyte Absolute 1.02 1.00 - 4.00 x10(3) uL    Monocyte Absolute 0.47 0.10 - 1.00 x10(3) uL    Eosinophil Absolute 0.13 0.00 - 0.70 x10(3) uL    Basophil Absolute 0.07 0.00 - 0.20 x10(3) uL    Immature Granulocyte Absolute 0.02 0.00 - 1.00 x10(3) uL    Neutrophil % 68.4 %    Lymphocyte % 18.8 %    Monocyte % 8.7 %    Eosinophil % 2.4 %    Basophil % 1.3 %    Immature Granulocyte % 0.4 %       ASSESSMENT AND PLAN:     # R breast ca (T2 N1 Mx): S/p R lumpectomy/SLN bx 8/24-3.6 cm grade 2 ILC, 3+ LN, largest metastasis 1.2 cm.  Multiple foci GENA. No LVI.  % ER, WI negative, HER2 negative, Ki-67 1%.  Oncotype score was 8 which corresponds to a 2% risk of distant recurrence.  RT completed.      Started anastrozole 9/24.  Continue till 9/34 given high risk disease.      Abemaciclib prescribed, has not started but will do so shortly.  Continue for 2 years.      Due for bilateral mammogram in late March/early April.  Holding off as she still has lymphedema.    Also candidate for adjuvant bisphosphonates.  Start today.  Up-to-date on dental exam.  She has dental implants.    # CHEK 2 mutation: Continue breast cancer screening.  Colonoscopy q 5 years.    # Osteopenia: DEXA 10/24 showed osteopenia with T-score -1.4, continue calcium/vitamin D, weightbearing exercises and repeat DEXA 10/26.  Plan adjuvant bisphosphonates per ASCO guidelines.    # Abnormal CT: CT showed sclerotic lesions right iliac bone and left femur head but they were negative and bone scan, likely bone islands.    # Pulmonary nodules: Noted on CT, active smoker.  CT 3/25 showed stable pulmonary nodules.  May repeat 1 year.     # PTSD/agoraphobia: Seeing psychiatrist and getting therapy.    ORDERS PLACED:    Requested Prescriptions     Signed Prescriptions Disp Refills    ondansetron (ZOFRAN) 8 MG tablet 30 tablet 3     Sig: Take 1 tablet (8 mg total) by mouth every 8 (eight) hours as needed for Nausea.       Return for Labs 2 weeks. Labs, MD 3  months.    Leighann John M.D.    [facility]    [facility]  [address] [place], [address]    6/3/2025           [1]   Allergies  Allergen Reactions    Codeine OTHER (SEE COMMENTS)     Migraines     Epinephrine OTHER (SEE COMMENTS)     Injection    Penicillin G CARDIAC ARREST    Sulfa Antibiotics CARDIAC ARREST    Eggs Or Egg-Derived Products OTHER (SEE COMMENTS)     Makes her mucusy     Berries UNKNOWN    Chicken Allergy      And ALL FOWL type bird    Chocolate     Clindamycin OTHER (SEE COMMENTS)     .    Dairy Products     Grass      trees    Molds & Smuts     Mushrooms     Soy [Isoflavones, Soy]     Biaxin [Clarithromycin] DIARRHEA    Daily Multi-Vitamins-Iron DIARRHEA

## 2025-06-03 ENCOUNTER — OFFICE VISIT (OUTPATIENT)
Age: 75
End: 2025-06-03
Attending: FAMILY MEDICINE
Payer: MEDICARE

## 2025-06-03 VITALS
RESPIRATION RATE: 16 BRPM | TEMPERATURE: 98 F | SYSTOLIC BLOOD PRESSURE: 147 MMHG | BODY MASS INDEX: 25.32 KG/M2 | DIASTOLIC BLOOD PRESSURE: 75 MMHG | HEIGHT: 60 IN | OXYGEN SATURATION: 93 % | WEIGHT: 129 LBS | HEART RATE: 76 BPM

## 2025-06-03 DIAGNOSIS — R11.0 NAUSEA: ICD-10-CM

## 2025-06-03 DIAGNOSIS — C50.919 INVASIVE LOBULAR CARCINOMA OF BREAST IN FEMALE (HCC): Primary | ICD-10-CM

## 2025-06-03 DIAGNOSIS — C77.9 REGIONAL LYMPH NODE METASTASIS PRESENT (HCC): ICD-10-CM

## 2025-06-03 DIAGNOSIS — M89.9 BONE DISORDER: Primary | ICD-10-CM

## 2025-06-03 DIAGNOSIS — T50.905D ADVERSE EFFECT OF DRUG, SUBSEQUENT ENCOUNTER: ICD-10-CM

## 2025-06-03 DIAGNOSIS — C50.919 INVASIVE LOBULAR CARCINOMA OF BREAST IN FEMALE (HCC): ICD-10-CM

## 2025-06-03 RX ORDER — ZOLEDRONIC ACID 0.04 MG/ML
4 INJECTION, SOLUTION INTRAVENOUS ONCE
OUTPATIENT
Start: 2025-11-18

## 2025-06-03 RX ORDER — ONDANSETRON 8 MG/1
8 TABLET, FILM COATED ORAL EVERY 8 HOURS PRN
Qty: 30 TABLET | Refills: 3 | Status: SHIPPED | OUTPATIENT
Start: 2025-06-03

## 2025-06-03 NOTE — PROGRESS NOTES
Education Record    Learner:  Patient    Disease / Diagnosis:right breast cancer       Barriers / Limitations:  None   Comments:    Method:  Discussion   Comments:    General Topics:  Plan of care reviewed   Comments:    Outcome:  Shows understanding   Comments:   Pt has not started abamciclib yet.   Taking letrozole. Reports that she feels cold all the time, and has some hot flashes.   Had her gall bladder out 4/24/25.   Pt feeling very nervous and scared about starting new medications.   Reassurance given, and reviewed to contact the office with side effects of concerns.   Plans to start this weekend, when her son will be home.     Will contact specialty pharmacy regarding abema, had put the script on hold

## 2025-06-03 NOTE — PROGRESS NOTES
Pt here for First Zometa . Pt denies any issues or concerns.      Ordering Provider: Dr. John  Order Exp: ongoing - ordered every 6 months     Pt tolerated infusion without difficulty or complaint. Reviewed next apt date/time: next appointments were made by the patient for labs and MD in 3 months. Will have labs in 2 weeks drawn at a reference lab close to where she lives      Education Record  Learner:  Patient  Disease / Diagnosis: Breast Ca  Barriers / Limitations:  None  Method:  Brief focused, Printed material, and Reinforcement  General Topics:  Medication, Side effects and symptom management, Plan of care reviewed, and Fall risk and prevention  Outcome:  Shows understanding

## 2025-06-04 ENCOUNTER — OFFICE VISIT (OUTPATIENT)
Dept: PHYSICAL THERAPY | Facility: HOSPITAL | Age: 75
End: 2025-06-04
Attending: INTERNAL MEDICINE
Payer: MEDICARE

## 2025-06-04 PROCEDURE — 97140 MANUAL THERAPY 1/> REGIONS: CPT | Performed by: PHYSICAL THERAPIST

## 2025-06-04 NOTE — PROGRESS NOTES
Patient: Sophia Rendon (74 year old, female) Referring Provider:  Insurance:   Diagnosis: Invasive lobular carcinoma of breast in female (HCC) (C50.919)  , I89.0 lymphedema Heike BRANHAM   Date of Surgery: 8/16/2024  N/A   Precautions:  Drug Allergy; Other (use comment); Cancer  Referral Information:    Date of Evaluation: Req: 8, Auth: 8, Exp: 6/12/2025 03/12/25 POC Auth Visits:          Today's Date   6/4/2025    Subjective  \"I've been trying to do my massage, I think it's better\"       Pain: 1/10 (\"soreness\" of the breast)     Objective  Swelling R breast. Pt having anxiety attack towards the end of treatment (pt began trembling, difficulty speaking, and having mm spasms). Provided pt water (pt taking her medication), provided emotional support, pt started to relax/improved. Pt's symptoms subsided after approx 25 minutes   Edema/Tissue       4/10/2025 5/15/2025 6/4/2025   Edema/Tissue Observations   Trunk Locations Right Breast Right Breast;Right Upper Lateral Trunk Right Breast;Right Upper Lateral Trunk   Edema/Tissue Observations: Right upper lateral trunk  swelling --        no significant swelling   Edema/Tissue Observations: Right breast swelling;pitting;erthema/red;hyperkeratosis swelling;pitting;erthema/red;hyperkeratosis swelling;pitting   Stemmer's   Negative   Trunk Measurement 1 11 cm inf from sternal notch: 92.8 (IE 93 cm) 11 cm inf from sternal notch: 92.6 (IE 93 cm) 11 cm inf from sternal notch: 92. (IE 93 cm)   Trunk Measurement 2 21 cm in from sternal notch: 95.5 CM (IE 95.8 cm) 21 cm in from sternal notch: 96 CM (IE 95.8 cm) 21 cm in from sternal notch: 95.3 CM (IE 95.8 cm)         Assessment  Swelling is decreasing. Pt's anxiety attacks does limit her ability to attend therapy    Goals (to be met in 10 visits)      LYMPHEDEMA GOALS Not Met Progressing Toward Partially Met Met   Decrease swelling R breast by a total of 2 cm to decrease risk of infection       Pt to be  independent with self MLD, ROM exercises, and donning/doffing compression bandages/garments to facilitate swelling reduction and to be independent at self-management once discharged.                          Plan  cont w/ CDT as pt is able    Treatment Last 4 Visits  Treatment Day: 4       3/12/2025 4/10/2025 5/15/2025 6/4/2025   PT Treatment   Insurance Auth # and Certification Dates Certification From: 3/12/2025  To:6/10/2025 Certification From: 3/12/2025  To:6/10/2025 Certification From: 3/12/2025  To:6/10/2025 Certification From: 3/12/2025  To:6/10/2025   # of Visits Used/Approved 1/10 2/10 3/10 4/10   Manual Therapy MLD: Neck sequence, abd sequence, left axillary, A-A, R inguinal, R A-I, Right axillary, Right breast, RUE.    Soft debridement of hyperkeratosis R breast    Compression:  - pt has OTS sleeve and gauntlet to use as needed  - pt wearing sports bra which is too tight around trunk. Sample light compression bra given to pt (better fit), fabricated foam for inferior breast for compression and elevation MLD: Neck sequence, abd sequence, left axillary, A-A, R inguinal, R A-I, Right axillary, Right breast    Compression:  - pt wearing bra w/ compression pads   MLD: neck sequence, abd sequence, R inguinal, R A-I, L axilla, A-A, R axilla, R breast. Deep technique areas of firmness    Compression:  - pt wearing bra w/ compression pads over right breast STM R pect    MLD: Neck sequence, abd sequence, left axillary, A-A, R inguinal, R A-I, Right axillary, Right breast.      Compression: pt wearing compression sports bra and compression pad.       Therapeutic Activity Instr in self MLD (verbal, demo, written)      Explained Lymphedema diagnosis; reviewed lymphatic system, informed patient of lymphedema precautions and risk reduction practices, and importance of skin care.    Reviewed self MLD     Therapeutic Exercise Min  10     Manual Therapy Min 40 40 45 40   Therapeutic Activity Min 20      Evaluation Min 15       Other Therapy Min    25   Total of Timed Procedures 60 50 45 40   Total of Service Based 15 0 0 25   Total Treatment Time 75 50 45 65        HEP       Charges     mm3  (25 minutes w/ pt during anxiety attack/non-billable)

## 2025-06-20 ENCOUNTER — LAB ENCOUNTER (OUTPATIENT)
Dept: LAB | Age: 75
End: 2025-06-20
Attending: INTERNAL MEDICINE
Payer: MEDICARE

## 2025-06-20 DIAGNOSIS — R11.0 NAUSEA: ICD-10-CM

## 2025-06-20 DIAGNOSIS — C50.919 INVASIVE LOBULAR CARCINOMA OF BREAST IN FEMALE (HCC): ICD-10-CM

## 2025-06-20 LAB
ALBUMIN SERPL-MCNC: 4.5 G/DL (ref 3.2–4.8)
ALBUMIN/GLOB SERPL: 1.8 {RATIO} (ref 1–2)
ALP LIVER SERPL-CCNC: 75 U/L (ref 55–142)
ALT SERPL-CCNC: 18 U/L (ref 10–49)
ANION GAP SERPL CALC-SCNC: 6 MMOL/L (ref 0–18)
AST SERPL-CCNC: 25 U/L (ref ?–34)
BASOPHILS # BLD AUTO: 0.07 X10(3) UL (ref 0–0.2)
BASOPHILS NFR BLD AUTO: 1.3 %
BILIRUB SERPL-MCNC: 0.9 MG/DL (ref 0.2–1.1)
BUN BLD-MCNC: 15 MG/DL (ref 9–23)
CALCIUM BLD-MCNC: 9.1 MG/DL (ref 8.7–10.6)
CHLORIDE SERPL-SCNC: 102 MMOL/L (ref 98–112)
CO2 SERPL-SCNC: 29 MMOL/L (ref 21–32)
CREAT BLD-MCNC: 1 MG/DL (ref 0.55–1.02)
EGFRCR SERPLBLD CKD-EPI 2021: 59 ML/MIN/1.73M2 (ref 60–?)
EOSINOPHIL # BLD AUTO: 0.19 X10(3) UL (ref 0–0.7)
EOSINOPHIL NFR BLD AUTO: 3.6 %
ERYTHROCYTE [DISTWIDTH] IN BLOOD BY AUTOMATED COUNT: 13.6 %
FASTING STATUS PATIENT QL REPORTED: NO
GLOBULIN PLAS-MCNC: 2.5 G/DL (ref 2–3.5)
GLUCOSE BLD-MCNC: 89 MG/DL (ref 70–99)
HCT VFR BLD AUTO: 44.1 % (ref 35–48)
HGB BLD-MCNC: 15.2 G/DL (ref 12–16)
IMM GRANULOCYTES # BLD AUTO: 0.02 X10(3) UL (ref 0–1)
IMM GRANULOCYTES NFR BLD: 0.4 %
LYMPHOCYTES # BLD AUTO: 1.3 X10(3) UL (ref 1–4)
LYMPHOCYTES NFR BLD AUTO: 24.6 %
MCH RBC QN AUTO: 31.7 PG (ref 26–34)
MCHC RBC AUTO-ENTMCNC: 34.5 G/DL (ref 31–37)
MCV RBC AUTO: 92.1 FL (ref 80–100)
MONOCYTES # BLD AUTO: 0.27 X10(3) UL (ref 0.1–1)
MONOCYTES NFR BLD AUTO: 5.1 %
NEUTROPHILS # BLD AUTO: 3.43 X10 (3) UL (ref 1.5–7.7)
NEUTROPHILS # BLD AUTO: 3.43 X10(3) UL (ref 1.5–7.7)
NEUTROPHILS NFR BLD AUTO: 65 %
OSMOLALITY SERPL CALC.SUM OF ELEC: 284 MOSM/KG (ref 275–295)
PLATELET # BLD AUTO: 270 10(3)UL (ref 150–450)
POTASSIUM SERPL-SCNC: 4.4 MMOL/L (ref 3.5–5.1)
PROT SERPL-MCNC: 7 G/DL (ref 5.7–8.2)
RBC # BLD AUTO: 4.79 X10(6)UL (ref 3.8–5.3)
SODIUM SERPL-SCNC: 137 MMOL/L (ref 136–145)
WBC # BLD AUTO: 5.3 X10(3) UL (ref 4–11)

## 2025-06-20 PROCEDURE — 85025 COMPLETE CBC W/AUTO DIFF WBC: CPT

## 2025-06-20 PROCEDURE — 36415 COLL VENOUS BLD VENIPUNCTURE: CPT

## 2025-06-20 PROCEDURE — 80053 COMPREHEN METABOLIC PANEL: CPT

## 2025-06-23 ENCOUNTER — APPOINTMENT (OUTPATIENT)
Dept: PHYSICAL THERAPY | Facility: HOSPITAL | Age: 75
End: 2025-06-23
Attending: INTERNAL MEDICINE
Payer: MEDICARE

## 2025-06-23 ENCOUNTER — APPOINTMENT (OUTPATIENT)
Dept: RADIATION ONCOLOGY | Facility: HOSPITAL | Age: 75
End: 2025-06-23
Attending: RADIOLOGY
Payer: MEDICARE

## 2025-06-30 ENCOUNTER — HOSPITAL ENCOUNTER (OUTPATIENT)
Dept: RADIATION ONCOLOGY | Facility: HOSPITAL | Age: 75
End: 2025-06-30
Attending: RADIOLOGY
Payer: MEDICARE

## 2025-06-30 VITALS
SYSTOLIC BLOOD PRESSURE: 102 MMHG | BODY MASS INDEX: 25 KG/M2 | RESPIRATION RATE: 16 BRPM | DIASTOLIC BLOOD PRESSURE: 54 MMHG | TEMPERATURE: 97 F | HEART RATE: 63 BPM | WEIGHT: 128.63 LBS | OXYGEN SATURATION: 98 %

## 2025-06-30 DIAGNOSIS — C50.919 INVASIVE LOBULAR CARCINOMA OF BREAST IN FEMALE (HCC): Primary | ICD-10-CM

## 2025-06-30 PROCEDURE — 99213 OFFICE O/P EST LOW 20 MIN: CPT

## 2025-06-30 NOTE — PATIENT INSTRUCTIONS
- FOLLOW-UP WITH DR. EDDY ONLY IF NEEDED      - CALL THE NURSING LINE AT [phone] IF YOU HAVE ANY QUESTIONS/CONCERNS REGARDING RADIATION THERAPY

## 2025-06-30 NOTE — PROGRESS NOTES
[facility]  RADIATION ONCOLOGY   FOLLOW UP     Sophia Rendon  11/17/1950    DIAGNOSIS: Right breast cancer     CANCER HISTORY   73 yo with severe agoraphobia. Had BCS and SN biopsy for pT2 N1 grade 2 ILC. ER+ Her2 0. Close final lateral margin. 3 positive nodes with multiple foci of GENA. No axillary dissection. No chemo with Oncotype DX RS 8. Developed cording postop. Had aspiration of seroma x 2. Started her AI. Then underwent adjuvant RT. Fractionation was adjusted to accommodate her agoraphobia. Completed 12/2024.    INTERIM HISTORY   Here for follow up. Ongoing PT for cording, feeling better with that. Also with breast edema, getting better. No hand/arm edema. Has a sleeve and gauntlet to use with exertion.    Having GI issues with Verzenio.    EXAM   No neck or axillary LAD  Striations in R breast from edema. No masses. No skin lesions.  No hand/arm edema.  Excellent ROM.    IMPRESSION/PLAN   Doing well 6 months after adjuvant breast and mary ann RT    Continue PT  F/u med onc  Contact navigator to get custom bra  F/u here PRN    Pawan Gomez MD  Radiation Oncology      15 minutes were spent with the patient, more than 50 percent on counseling/coordination of care (discuss disease status, management of any side effects, future follow up plans)  
Nursing Follow-Up Note    Patient: Sophia Rendon  YOB: 1950  Age: 74 year old  Radiation Oncologist: Dr. Pawan Gomez  Referring Physician: No ref. provider found  Chief Complaint:   Chief Complaint   Patient presents with    Follow - Up     Date: 6/30/2025    Toxicities: n/a      Vital Signs: /54 (BP Location: Left arm, Patient Position: Sitting, Cuff Size: adult)   Pulse 63   Temp 97.4 °F (36.3 °C) (Tympanic)   Resp 16   Wt 58.3 kg (128 lb 9.6 oz)   SpO2 98%   BMI 25.12 kg/m² ,   Wt Readings from Last 6 Encounters:   06/30/25 58.3 kg (128 lb 9.6 oz)   06/03/25 58.5 kg (129 lb)   04/24/25 59.9 kg (132 lb)   04/17/25 59 kg (130 lb)   03/26/25 59 kg (130 lb)   03/04/25 59 kg (130 lb)       Allergies:  Allergies[1]    Nursing Note: Hx of R breast cancer. S/p lumpectomy with SLNB- close lateral margina dn 3 + LN. Completed RT to R breast and SCF on 12/11/24. Here for first follow up today. Is now taking Verzenio. Has some issues with diarrhea. Pt states skin has healed well. Has some swelling in breast. Goes to PT. States cording has resolved.          [1]   Allergies  Allergen Reactions    Codeine OTHER (SEE COMMENTS)     Migraines     Epinephrine OTHER (SEE COMMENTS)     Injection    Penicillin G CARDIAC ARREST    Sulfa Antibiotics CARDIAC ARREST    Eggs Or Egg-Derived Products OTHER (SEE COMMENTS)     Makes her mucusy     Berries UNKNOWN    Chicken Allergy      And ALL FOWL type bird    Chocolate     Clindamycin OTHER (SEE COMMENTS)     .    Dairy Products     Grass      trees    Molds & Smuts     Mushrooms     Soy [Isoflavones, Soy]     Biaxin [Clarithromycin] DIARRHEA    Daily Multi-Vitamins-Iron DIARRHEA     
0 = independent

## 2025-07-02 ENCOUNTER — OFFICE VISIT (OUTPATIENT)
Dept: PHYSICAL THERAPY | Facility: HOSPITAL | Age: 75
End: 2025-07-02
Attending: INTERNAL MEDICINE
Payer: MEDICARE

## 2025-07-02 PROCEDURE — 97140 MANUAL THERAPY 1/> REGIONS: CPT | Performed by: PHYSICAL THERAPIST

## 2025-07-02 NOTE — PROGRESS NOTES
Patient: Sophia Rendon (74 year old, female) Referring Provider:  Insurance:   Diagnosis: Invasive lobular carcinoma of breast in female (HCC) (C50.919)  , I89.0 lymphedema Heike SOMMERS YONAS   Date of Surgery: 8/16/2024  N/A   Precautions:  Drug Allergy; Other (use comment); Cancer  Referral Information:    Date of Evaluation: Req: 8, Auth: 8, Exp: 8/12/2025 03/12/25 POC Auth Visits:          Today's Date   7/2/2025    Subjective  \"I keep rubbing it\"       Pain: 1/10 (R axilla)     Objective  Swelling R breast   Edema/Tissue       5/15/2025 6/4/2025 7/2/2025   Edema/Tissue Observations   RUE Locations    --        No signs or symptoms of swelling RUE   Trunk Locations Right Breast;Right Upper Lateral Trunk Right Breast;Right Upper Lateral Trunk    Edema/Tissue Observations: Right upper lateral trunk swelling --        no significant swelling    Edema/Tissue Observations: Right breast swelling;pitting;erthema/red;hyperkeratosis swelling;pitting swelling;induration;hyperplasia;hyperpigmentation   Stemmer's  Negative    # of Trunk Measurements   2   Trunk Measurement 1 11 cm inf from sternal notch: 92.6 (IE 93 cm) 11 cm inf from sternal notch: 92. (IE 93 cm) 11 cm inf from sternal notch: 92. (IE 93 cm)   Trunk Measurement 2 21 cm in from sternal notch: 96 CM (IE 95.8 cm) 21 cm in from sternal notch: 95.3 CM (IE 95.8 cm) 21 cm in from sternal notch: 95.5 CM (IE 95.8 cm)         Assessment  Breast tissue softening. Swelling decreased after MLD.    Goals (to be met in 10 visits)      LYMPHEDEMA GOALS Not Met Progressing Toward Partially Met Met   Decrease swelling R breast by a total of 2 cm to decrease risk of infection       Pt to be independent with self MLD, ROM exercises, and donning/doffing compression bandages/garments to facilitate swelling reduction and to be independent at self-management once discharged.                              Plan  cont w/ CDT as pt is able    Treatment Last 4  Visits  Treatment Day: 5       4/10/2025 5/15/2025 6/4/2025 7/2/2025   PT Treatment   Insurance Auth # and Certification Dates Certification From: 3/12/2025  To:6/10/2025 Certification From: 3/12/2025  To:6/10/2025 Certification From: 3/12/2025  To:6/10/2025 Certification From: 3/12/2025  To:6/10/2025   # of Visits Used/Approved 2/10 3/10 4/10 5/10   Manual Therapy MLD: Neck sequence, abd sequence, left axillary, A-A, R inguinal, R A-I, Right axillary, Right breast    Compression:  - pt wearing bra w/ compression pads   MLD: neck sequence, abd sequence, R inguinal, R A-I, L axilla, A-A, R axilla, R breast. Deep technique areas of firmness    Compression:  - pt wearing bra w/ compression pads over right breast STM R pect    MLD: Neck sequence, abd sequence, left axillary, A-A, R inguinal, R A-I, Right axillary, Right breast.      Compression: pt wearing compression sports bra and compression pad.     Trunk measurements    MLD: Neck sequence, abd sequence, left axillary, A-A, R inguinal, R A-I, Right axillary, Right breast, RUE. Increased time on breast w/ deep technique    Compression:  - provided fabricated foam compression pad for inferior breast   Therapeutic Activity Reviewed self MLD   Encouraged pt to call nurse navigator for custom bra Dr Gomez wants her to have.    Discussed compression bras, measured for Keith montiel. Pt is size Medium for this bra. Pt does not want to pursue getting this at this time.   Therapeutic Exercise Min 10      Manual Therapy Min 40 45 40 55   Other Therapy Min   25    Total of Timed Procedures 50 45 40 55   Total of Service Based 0 0 25 0   Total Treatment Time 50 45 65 55        HEP       Charges     mm4

## 2025-07-10 ENCOUNTER — TELEPHONE (OUTPATIENT)
Age: 75
End: 2025-07-10

## 2025-07-10 NOTE — TELEPHONE ENCOUNTER
Returned the call.   Pt has been taking imodium, up to 2 and a half in a day   Reviewed how to take it.   Pt reports having some cramping.   Reviewed to eat small freq meals bland/ brat diet.   Pt eating toast/ crackers/ bagel.   Drinking a lot no signs of dehydration.   Pt advised to hold the verzenio, per Dr John, and call us on Monday with an update.   Pt verbalized understanding, and comfortable with the plan.   Other questions addressed and emotional support offered.

## 2025-07-10 NOTE — TELEPHONE ENCOUNTER
Pt has had diarrhea since 6/11/25 = 2-4 times a day.  Pt does not want to eat and as a result is eating very little.   Cramping in lower abdomen and it is sore.   Electrolyte drinks are hard to drink.  She is trying to drink a lot of water     Please call Sophia Rendon [phone]

## 2025-07-15 ENCOUNTER — TELEPHONE (OUTPATIENT)
Age: 75
End: 2025-07-15

## 2025-07-15 NOTE — TELEPHONE ENCOUNTER
Returned the call ,   After reviewing with Dr John,   Advised pt to resume, taking verzenio once a day for a week, then increasing to twice per day.   Let us know how she is doing, pt verbalized understanding, and is comfortable with the plan   Questions addressed .

## 2025-07-15 NOTE — TELEPHONE ENCOUNTER
Pt called to give an update after stopping the verzenio. Pt stated she took 3 imodium on Thursday and Friday she was fine and the diarrhea started again. Pt said she is doing better overall and is eating real food. Pt would like a call back letting her know if she should start back on the medication

## 2025-07-23 ENCOUNTER — APPOINTMENT (OUTPATIENT)
Dept: PHYSICAL THERAPY | Facility: HOSPITAL | Age: 75
End: 2025-07-23
Attending: INTERNAL MEDICINE
Payer: MEDICARE

## 2025-07-28 ENCOUNTER — TELEPHONE (OUTPATIENT)
Facility: LOCATION | Age: 75
End: 2025-07-28

## 2025-07-28 DIAGNOSIS — C50.919 INVASIVE LOBULAR CARCINOMA OF BREAST IN FEMALE (HCC): ICD-10-CM

## 2025-07-28 DIAGNOSIS — C77.9 REGIONAL LYMPH NODE METASTASIS PRESENT (HCC): ICD-10-CM

## 2025-07-28 NOTE — TELEPHONE ENCOUNTER
Toxicities: Abemaciclib/Letrozole    Anorexia/Nausea/Diarrhea/Dehydration     Anorexia: Grade 1/Nausea: Grade 2 (Sophia reports that since 7/18/2025 she has been having trouble eating due to diarrhea, abdominal cramping and gas pain. She is eating foods from the BRAT diet. She can't drink protein shakes or ensure clear because she is allergic to soy. Yesterday she was nauseated. No vomiting. She forgot to take her zofran. Today no nausea. She feels better and was able to eat a bagel. She is following a BRAT diet.)  Diarrhea: Grade 1 (Sophia reports since she started taking the abemaciclib in June she has had on going issues with diarrhea. She has been averaging 1-3 episodes of liquid brown stool daily until Saturday. On Saturday through to Sunday at 5 am she has been having frequent, liquid brown stools with abdominal cramping and gas pain. She took 6 imodium. She has not had any more loose of liquid stool since. She stopped taking her abemaciclib on Sunday and she did not take it today. \"I feel much better now that I stopped the medication. I can't continue to take the medication.\")  Dehydration: Grade 1 (Sophia reports she is pushing water, decaf tea and sprite. She feels tired, but denies feeling light headed or dizzy. She said she does not feel the need to come to the office for an ACV.)    I told Sophia to please continue to use imodium if the diarrhea returns. Continue to eat the BRAT diet foods and push caffeine free fluids. I will update Dr John and Alyson, APRN.                                                        Dehydration: Grade 1     Diarrhea: Grade 1

## 2025-07-28 NOTE — TELEPHONE ENCOUNTER
Patient states she is having side effects from Verzenio lot of diarrhea restarted medication 7/15 once a day and two days later diarrhea started also taking imodium once she increased medication 7/22 to twice a day the diarrhea increased. Saturday she lost count at 10 on going to bathroom. Eating very little and stopped the medication as of yesterday. She has lost weight from this. Saturday she ended up taking 6 imodium's to stop the diarrhea at 5 am.    Please contact patient

## 2025-07-28 NOTE — TELEPHONE ENCOUNTER
Returned the call.   Advised her to continue to hold the verzenio, and that a lower dose was sent to the specialty pharmacy,   Will take a bit of time to get it, but then try to re start   Advised to recheck labs prior to restarting.   Other questions addressed.   Emotional support offered.

## 2025-08-05 ENCOUNTER — TELEPHONE (OUTPATIENT)
Facility: LOCATION | Age: 75
End: 2025-08-05

## 2025-08-05 DIAGNOSIS — R63.0 LOSS OF APPETITE: Primary | ICD-10-CM

## 2025-08-08 ENCOUNTER — APPOINTMENT (OUTPATIENT)
Dept: PHYSICAL THERAPY | Facility: HOSPITAL | Age: 75
End: 2025-08-08
Attending: INTERNAL MEDICINE

## 2025-08-20 ENCOUNTER — OFFICE VISIT (OUTPATIENT)
Dept: PHYSICAL THERAPY | Facility: HOSPITAL | Age: 75
End: 2025-08-20
Attending: INTERNAL MEDICINE

## 2025-08-20 PROCEDURE — 97140 MANUAL THERAPY 1/> REGIONS: CPT | Performed by: PHYSICAL THERAPIST

## 2025-08-22 ENCOUNTER — TELEPHONE (OUTPATIENT)
Facility: LOCATION | Age: 75
End: 2025-08-22

## (undated) DEVICE — Device

## (undated) DEVICE — SYRINGE MED 10ML LL TIP W/O SFTY DISP

## (undated) DEVICE — SLEEVE COMPR MD KNEE LEN SGL USE KENDALL SCD

## (undated) DEVICE — L-HOOK CAUTERY PROBE TIP, DISPOSABLE: Brand: RENEW

## (undated) DEVICE — BREAST-HERNIA-PORT CDS-LF: Brand: MEDLINE INDUSTRIES, INC.

## (undated) DEVICE — SUT MCRYL 4-0 27IN ABSRB UD 19MM PS-2 3/8

## (undated) DEVICE — BINDER ABD UNISX 9IN 45IN SM AND M UNIV

## (undated) DEVICE — #15 STERILE STAINLESS BLADE: Brand: STERILE STAINLESS BLADES

## (undated) DEVICE — TROCAR: Brand: KII SHIELDED BLADED ACCESS SYSTEM

## (undated) DEVICE — TRADITIONAL MARYLAND DISSECTOR TIP, DISPOSABLE: Brand: RENEW

## (undated) DEVICE — MINI ENDOCUT SCISSOR TIP, DISPOSABLE: Brand: RENEW

## (undated) DEVICE — 40580 - THE PINK PAD - ADVANCED TRENDELENBURG POSITIONING KIT: Brand: 40580 - THE PINK PAD - ADVANCED TRENDELENBURG POSITIONING KIT

## (undated) DEVICE — SUT COAT VCRL + 0 54IN ABSRB UD ANTIBACT

## (undated) DEVICE — GLOVE SUR 6.5 SENSICARE PI PIP GRN PWD F

## (undated) DEVICE — CLIP LIG M BLU TI HRT SHP WIRE HORZ

## (undated) DEVICE — Device: Brand: SUTURE PASSOR PRO

## (undated) DEVICE — COVER LT HNDL RIG FOR SUR CAM DISP

## (undated) DEVICE — GLOVE SUR 6.5 SENSICARE PI PIP CRM PWD F

## (undated) DEVICE — GRABBER GRASPER TIP, DISPOSABLE: Brand: RENEW

## (undated) DEVICE — ELECTRODE ES 2.75IN PTFE BLDE MOD E-Z CLN

## (undated) DEVICE — SUT MCRYL 4-0 18IN PS-2 ABSRB UD 19MM 3/8 CIR

## (undated) DEVICE — COVER,LIGHT,CAMERA,HARD,1/PK,STRL: Brand: MEDLINE

## (undated) DEVICE — POUCH SPECIMEN WIRE 6X3 250ML

## (undated) DEVICE — PROVE COVER: Brand: UNBRANDED

## (undated) DEVICE — TROCAR: Brand: KII® SLEEVE

## (undated) DEVICE — UNDERPAD INCONT 23X36IN STD BLU BACKSHEET

## (undated) DEVICE — ENDOPATH ULTRA VERESS INSUFFLATION NEEDLES WITH LUER LOCK CONNECTORS: Brand: ENDOPATH

## (undated) DEVICE — DRAPE,TAPE STRIPS,STERILE: Brand: MEDLINE

## (undated) DEVICE — SHEET,DRAPE,40X58,STERILE: Brand: MEDLINE

## (undated) DEVICE — LAP CHOLE/APPY CDS-LF: Brand: MEDLINE INDUSTRIES, INC.

## (undated) DEVICE — LAPAROVUE VISIBILITY SYSTEM LAPAROSCOPIC SOLUTIONS: Brand: LAPAROVUE

## (undated) DEVICE — SYRINGE MED 5ML STD CLR PLAS LL TIP N CTRL

## (undated) DEVICE — SYRINGE MED 20ML STD LUERLOCK TIP AUTOCLV RIG

## (undated) DEVICE — 3M™ STERI-DRAPE™ INSTRUMENT POUCH 1018: Brand: STERI-DRAPE™

## (undated) DEVICE — CLIP APPLIER WITH CLIP LOGIC TECHNOLOGY: Brand: ENDO CLIP III

## (undated) DEVICE — SUT PERMA- 2-0 18IN FS NABSRB BLK 26MM 3/8

## (undated) DEVICE — ANTIBACTERIAL UNDYED BRAIDED (POLYGLACTIN 910), SYNTHETIC ABSORBABLE SUTURE: Brand: COATED VICRYL

## (undated) DEVICE — ADHESIVE SKIN TOP FOR WND CLSR DERMBND ADV

## (undated) DEVICE — DALE ABDOMINAL BINDER, 12" WIDE, STRETCHES TO FIT 30"-45", 1 PER BOX.: Brand: DALE ABDOMINAL BINDER

## (undated) DEVICE — CLIP SUR SM TI HRT SHP WIRE HORZ LIG SYS

## (undated) DEVICE — DRAPE PACK CHEST

## (undated) DEVICE — SOLUTION IRRIG 1000ML 0.9% NACL USP BTL

## (undated) DEVICE — CLEAR MONOFILAMENT (POLYDIOXANONE), ABSORBABLE SURGICAL SUTURE: Brand: PDS

## (undated) DEVICE — STANDARD HYPODERMIC NEEDLE,POLYPROPYLENE HUB: Brand: MONOJECT

## (undated) NOTE — Clinical Note
Delayed in getting back to see you, scheduled now 3/4. Doing okay now that done. The agoraphobia has made it challenging for her but interested in counseling for this. Having more panic attacks, has her current Psychiatrist and Ritu VALVERDE looking into counselors for this issue. Should see Vane at some point, so many food allergies, not a great diet. Calm, pleasant through our appt.  thx

## (undated) NOTE — LETTER
Date: 8/8/2024    Patient Name: Sophia Rendon          To Whom it may concern:    This letter has been written at the patient's request. The above patient was seen at [facility] for treatment of a medical condition.    Suraj Tinsleybranden will need to care for mom with upcoming surgery and will need to be excused from work 8/16/24 and 8/19/24.           Sincerely,      Mallory Johnson, DO

## (undated) NOTE — LETTER
01/17/20        [address] [facility] [id number]      Dear Liz Swain,    [facility] records indicate that you have outstanding lab work and or testing that was ordered for you and has not yet been completed:  Orders Placed This Encounter

## (undated) NOTE — Clinical Note
FYI, TCM call made, see notes. NCM confirmed appointment on 7/16/18 changed visit type to TCM HFU visit type.

## (undated) NOTE — LETTER
To:  Dr Eveline Robertson   Date:  2024  Patient Name: Sophia Rendon DOB-Age / Sex: 1950-A: 73 y  female  Medical Records: [id number]   CSN: [id number]      Clearance for Surgery Requested by Surgeon    Request for:  Cardiac Clearance    Requested by Surgeon: Dr Yu     Surgical Date: 2024    Procedure: Right breast wire localized central lumpectomy,right breast lymphoscintigraphy, right breast sentinel lymph node biopsy, Right      Please fax the clearance note to the Pre-Admission Testing department.  Thank you.

## (undated) NOTE — LETTER
Dr. Goodman    Surgical Clearance Needed    Date: 6/20/2024                                                                       From: Amirah Hastings: Dr. Johnson                                                                                Fax:     RE: Surgical Clearance               # of Pages: 1        Patient Name: Sophia Rendon    Patient YOB: 1950    Consult For: Medical Clearance    Surgery: Laparoscopic cholecystectomy possible open with injection of indocyanine green (ICG)    Date of Surgery: TBD at [facility]        This facsimile transmission is provided for your internal use only. If the reader of this message is not the intended recipient, you are hereby notified that you have received this document in error, and that any review, dissemination, distribution, or copying of this message is strictly prohibited. If you have received this communication in error, please notify us immediately by telephone and return the original message to us by mail.

## (undated) NOTE — LETTER
01/06/21        [address] [facility] [address]      Dear Lolis Winslow,    [address] records indicate that you have outstanding lab work and or testing that was ordered for you and has not yet been completed:  Orders Placed This Encounter

## (undated) NOTE — Clinical Note
Thank you for allowing me to care for your patient! We will decide on her surgical plan after MRI and genetic testing. Thanks, Reyes Queen

## (undated) NOTE — LETTER
24    Patient: Sophia Rendon  : 1950 Visit date: 2024    Dear  Mallory Johnson,     Thank you for referring Sophia Rendon to my practice.  Please find my assessment and plan below.        Good afternoon.  Thank you for the referral.  I saw Sophia in my clinic today for symptomatic cholelithiasis.  I reviewed her ultrasound images and discussed her symptoms.  Based on these, I do believe Sophia is having significant symptoms from her cholelithiasis.  She would benefit from cholecystectomy but Sophia has a recent breast mass found.  She also has recent family history of breast cancer.  Sophia is set up for mammogram and workup for this new breast mass.  Once this has been figured out and Sophia would like to move forward, we will proceed with laparoscopic cholecystectomy.  Thank you once again for the referral and please let me know if anything else.    Sincerely,       Latia Goodman MD

## (undated) NOTE — LETTER
08/21/20        [address] [facility] [address]      Dear Liz Swain,    [facility] records indicate that you have outstanding lab work and or testing that was ordered for you and has not yet been completed:  Orders Placed This Encounter

## (undated) NOTE — LETTER
Latia Goodman M.D.    Surgical Clearance Needed    Date: 3/31/2025                                                                       From: SCOTTY Hastings: DR. ALESHIA NIETO                                                          RE: Surgical Clearance               # of Pages: 1        Patient Name: Sophia Rendon    Patient YOB: 1950    Consult For: MEDICAL CLEARANCE    Surgery: LAPAROSCOPIC CHOLECTYSTECTOMY, POSSIBLE OPEN, WITH INJECTION OF INDOCYANINE GREEN (ICG) AT [facility]    Date of Surgery: 4/24/25        This facsimile transmission is provided for your internal use only. If the reader of this message is not the intended recipient, you are hereby notified that you have received this document in error, and that any review, dissemination, distribution, or copying of this message is strictly prohibited. If you have received this communication in error, please notify us immediately by telephone and return the original message to us by mail.

## (undated) NOTE — LETTER
Dr. Goodman    Surgical Clearance Needed    Date: 6/20/2024                                                                       From: Amirah Hastings: Dr. MIGUELITO PORRAS                                                                             Fax:     RE: Surgical Clearance               # of Pages: 1        Patient Name: Sophia Rendon    Patient YOB: 1950    Consult For: Cardiac Clearance    Surgery: Laparoscopic cholecystectomy possible open with injection of indocyanine green (ICG)    Date of Surgery: TBD at [facility]        This facsimile transmission is provided for your internal use only. If the reader of this message is not the intended recipient, you are hereby notified that you have received this document in error, and that any review, dissemination, distribution, or copying of this message is strictly prohibited. If you have received this communication in error, please notify us immediately by telephone and return the original message to us by mail.

## (undated) NOTE — LETTER
OUTSIDE TESTING RESULT REQUEST     IMPORTANT: FOR YOUR IMMEDIATE ATTENTION  Please FAX all test results listed below to: [phone]     Testing already done on or about: asap     * * * * If testing is NOT complete, arrange with patient A.S.A.P. * * * *      Patient Name: Sophia Rendon  Surgery Date: 2024  Medical Record: [id number]  CSN: [id number]  : 1950 - A: 73 y     Sex: female  Surgeon(s):  Liliana Yu MD  Procedure: Right breast wire localized central lumpectomy,right breast lymphoscintigraphy, right breast sentinel lymph node biopsy  Anesthesia Type: General     Surgeon: Liliana Yu MD     The following Testing and Time Line are REQUIRED PER ANESTHESIA     EKG READ AND SIGNED WITHIN   90 days  BMP (requires 4 hour fast) within  90 days      Thank You,   Sent by:BRANDON

## (undated) NOTE — LETTER
To:  Dr Johnson   Date:  2024  Patient Name: Sophia Rendon DOB-Age / Sex: 1950-A: 73 y  female  Medical Records: [id number]   CSN: [id number]      Clearance for Surgery Requested by Surgeon    Request for:  Medical Clearance    Requested by Surgeon: Dr Yu    Surgical Date: 2024    Procedure: Right breast wire localized central lumpectomy,right breast lymphoscintigraphy, right breast sentinel lymph node biopsy, Right      Please fax the clearance note to the Pre-Admission Testing department.  Thank you.

## (undated) NOTE — LETTER
Latia Goodman M.D.    Surgical Clearance Needed    Date: 3/31/2025                                                                       From: SCOTTY Hastings: DR. MIGUELITO PORRAS                                                          Fax: [phone]    RE: Surgical Clearance               # of Pages: 1        Patient Name: Sophia Rendon    Patient YOB: 1950    Consult For: CARDIAC CLEARANCE    Surgery: LAPAROSCOPIC CHOLECTYSTECTOMY, POSSIBLE OPEN, WITH INJECTION OF INDOCYANINE GREEN (ICG) AT [facility]    Date of Surgery: 4/24/25        This facsimile transmission is provided for your internal use only. If the reader of this message is not the intended recipient, you are hereby notified that you have received this document in error, and that any review, dissemination, distribution, or copying of this message is strictly prohibited. If you have received this communication in error, please notify us immediately by telephone and return the original message to us by mail.